# Patient Record
Sex: FEMALE | Race: BLACK OR AFRICAN AMERICAN | NOT HISPANIC OR LATINO | Employment: OTHER | URBAN - METROPOLITAN AREA
[De-identification: names, ages, dates, MRNs, and addresses within clinical notes are randomized per-mention and may not be internally consistent; named-entity substitution may affect disease eponyms.]

---

## 2023-04-25 PROBLEM — R20.8 BURNING SENSATION OF FEET: Status: ACTIVE | Noted: 2023-04-25

## 2023-04-25 PROBLEM — M51.37 DEGENERATION OF INTERVERTEBRAL DISC AT L5-S1 LEVEL: Status: ACTIVE | Noted: 2023-04-25

## 2023-04-25 PROBLEM — M51.379 DEGENERATION OF INTERVERTEBRAL DISC AT L5-S1 LEVEL: Status: ACTIVE | Noted: 2023-04-25

## 2023-04-25 PROBLEM — I87.2 CHRONIC VENOUS INSUFFICIENCY: Status: ACTIVE | Noted: 2023-04-25

## 2023-04-25 PROBLEM — M54.9 BACK PAIN: Status: ACTIVE | Noted: 2023-04-25

## 2023-04-25 PROBLEM — D17.9 LIPOMA: Status: ACTIVE | Noted: 2023-04-25

## 2023-04-25 PROBLEM — E55.9 VITAMIN D DEFICIENCY: Status: ACTIVE | Noted: 2023-04-25

## 2023-04-25 PROBLEM — M54.2 CERVICAL PAIN: Status: ACTIVE | Noted: 2023-04-25

## 2023-04-25 PROBLEM — I35.1 NONRHEUMATIC AORTIC VALVE INSUFFICIENCY: Status: ACTIVE | Noted: 2023-04-25

## 2023-04-25 PROBLEM — M17.10 OSTEOARTHRITIS, LOWER LEG, LOCALIZED: Status: ACTIVE | Noted: 2023-04-25

## 2023-04-25 PROBLEM — N94.10 DYSPAREUNIA, FEMALE: Status: ACTIVE | Noted: 2023-04-25

## 2023-04-25 PROBLEM — M51.379 DEGENERATION OF LUMBOSACRAL INTERVERTEBRAL DISC WITH ACUTE HERNIATION: Status: ACTIVE | Noted: 2023-04-25

## 2023-04-25 PROBLEM — G25.81 RLS (RESTLESS LEGS SYNDROME): Status: ACTIVE | Noted: 2023-04-25

## 2023-04-25 PROBLEM — R06.09 DOE (DYSPNEA ON EXERTION): Status: ACTIVE | Noted: 2023-04-25

## 2023-04-25 PROBLEM — M51.27 DEGENERATION OF LUMBOSACRAL INTERVERTEBRAL DISC WITH ACUTE HERNIATION: Status: ACTIVE | Noted: 2023-04-25

## 2023-04-25 PROBLEM — F17.200 NICOTINE DEPENDENCE: Status: ACTIVE | Noted: 2023-04-25

## 2023-04-25 PROBLEM — J45.909 ASTHMA (HHS-HCC): Status: ACTIVE | Noted: 2023-04-25

## 2023-04-25 PROBLEM — F51.12 INSUFFICIENT SLEEP SYNDROME: Status: ACTIVE | Noted: 2023-04-25

## 2023-04-25 PROBLEM — M65.9 SYNOVITIS: Status: ACTIVE | Noted: 2023-04-25

## 2023-04-25 PROBLEM — R07.89 ATYPICAL CHEST PAIN: Status: ACTIVE | Noted: 2023-04-25

## 2023-04-25 PROBLEM — K43.9 VENTRAL HERNIA: Status: ACTIVE | Noted: 2023-04-25

## 2023-04-25 PROBLEM — C50.419 MALIGNANT NEOPLASM OF UPPER OUTER QUADRANT OF FEMALE BREAST (MULTI): Status: ACTIVE | Noted: 2023-04-25

## 2023-04-25 PROBLEM — R29.6 FALLS: Status: ACTIVE | Noted: 2023-04-25

## 2023-04-25 PROBLEM — M65.90 SYNOVITIS: Status: ACTIVE | Noted: 2023-04-25

## 2023-04-25 PROBLEM — R21: Status: ACTIVE | Noted: 2023-04-25

## 2023-04-25 PROBLEM — E11.9 TYPE 2 DIABETES MELLITUS (MULTI): Status: ACTIVE | Noted: 2023-04-25

## 2023-04-25 PROBLEM — R06.89 ABNORMAL BREATH SOUNDS: Status: ACTIVE | Noted: 2023-04-25

## 2023-04-25 PROBLEM — J01.10 ACUTE FRONTAL SINUSITIS: Status: ACTIVE | Noted: 2023-04-25

## 2023-04-25 PROBLEM — B49 FUNGAL INFECTION: Status: ACTIVE | Noted: 2023-04-25

## 2023-04-25 PROBLEM — T78.40XA ALLERGIC REACTION: Status: ACTIVE | Noted: 2023-04-25

## 2023-04-25 PROBLEM — F31.10 BIPOLAR DISORDER, CURRENT EPISODE MANIC WITHOUT PSYCHOTIC FEATURES (MULTI): Status: ACTIVE | Noted: 2023-04-25

## 2023-04-25 PROBLEM — K43.2 INCISIONAL HERNIA: Status: ACTIVE | Noted: 2023-04-25

## 2023-04-25 PROBLEM — R92.8 MAMMOGRAM ABNORMAL: Status: ACTIVE | Noted: 2023-04-25

## 2023-04-25 PROBLEM — R10.9 ABDOMINAL PAIN: Status: ACTIVE | Noted: 2023-04-25

## 2023-04-25 PROBLEM — R68.89 FLU-LIKE SYMPTOMS: Status: ACTIVE | Noted: 2023-04-25

## 2023-04-25 PROBLEM — G57.90 NEUROPATHY, LOWER EXTREMITY: Status: ACTIVE | Noted: 2023-04-25

## 2023-04-25 PROBLEM — G47.30 SLEEP APNEA: Status: ACTIVE | Noted: 2023-04-25

## 2023-04-25 PROBLEM — M19.90 ARTHRITIS: Status: ACTIVE | Noted: 2023-04-25

## 2023-04-25 PROBLEM — L03.116 CELLULITIS OF LEFT LOWER EXTREMITY: Status: ACTIVE | Noted: 2023-04-25

## 2023-04-25 PROBLEM — M51.36 DEGENERATION OF LUMBOSACRAL INTERVERTEBRAL DISC WITH ACUTE HERNIATION: Status: ACTIVE | Noted: 2023-04-25

## 2023-04-25 PROBLEM — E78.5 HLD (HYPERLIPIDEMIA): Status: ACTIVE | Noted: 2023-04-25

## 2023-04-25 PROBLEM — R10.32 LEFT LOWER QUADRANT PAIN: Status: ACTIVE | Noted: 2023-04-25

## 2023-04-25 PROBLEM — R10.31 ABDOMINAL PAIN, RLQ (RIGHT LOWER QUADRANT): Status: ACTIVE | Noted: 2023-04-25

## 2023-04-25 PROBLEM — D64.9 ANEMIA: Status: ACTIVE | Noted: 2023-04-25

## 2023-04-25 PROBLEM — S31.109A WOUND OF ABDOMEN: Status: ACTIVE | Noted: 2023-04-25
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
## 2023-04-25 PROBLEM — N17.9 ACUTE KIDNEY INJURY (CMS-HCC): Status: ACTIVE | Noted: 2023-04-25

## 2023-04-25 PROBLEM — M10.9 GOUT: Status: ACTIVE | Noted: 2023-04-25

## 2023-04-25 PROBLEM — G47.00 INSOMNIA: Status: ACTIVE | Noted: 2023-04-25

## 2023-04-25 PROBLEM — M25.569 KNEE PAIN: Status: ACTIVE | Noted: 2023-04-25

## 2023-04-25 PROBLEM — M17.11 PRIMARY OSTEOARTHRITIS OF RIGHT KNEE: Status: ACTIVE | Noted: 2023-04-25

## 2023-04-25 PROBLEM — M50.20 CERVICAL DISC HERNIATION: Status: ACTIVE | Noted: 2023-04-25

## 2023-04-25 PROBLEM — M25.551 RIGHT HIP PAIN: Status: ACTIVE | Noted: 2023-04-25

## 2023-04-25 PROBLEM — G47.14 HYPERSOMNIA DUE TO MEDICAL CONDITION: Status: ACTIVE | Noted: 2023-04-25

## 2023-04-25 PROBLEM — M70.61 GREATER TROCHANTERIC BURSITIS OF RIGHT HIP: Status: ACTIVE | Noted: 2023-04-25

## 2023-04-25 PROBLEM — M79.605 PAIN OF LEFT LOWER EXTREMITY: Status: ACTIVE | Noted: 2023-04-25

## 2023-04-25 PROBLEM — I10 HYPERTENSION: Status: ACTIVE | Noted: 2023-04-25

## 2023-04-25 PROBLEM — K46.9 HERNIA, ABDOMINAL: Status: ACTIVE | Noted: 2023-04-25

## 2023-04-25 PROBLEM — M54.16 LUMBAR RADICULOPATHY: Status: ACTIVE | Noted: 2023-04-25

## 2023-04-25 PROBLEM — G47.33 OBSTRUCTIVE SLEEP APNEA: Status: ACTIVE | Noted: 2023-04-25

## 2023-04-25 PROBLEM — R73.09 ABNORMAL GLUCOSE: Status: ACTIVE | Noted: 2023-04-25

## 2023-04-25 PROBLEM — E78.00 PURE HYPERCHOLESTEROLEMIA: Status: ACTIVE | Noted: 2023-04-25

## 2023-04-25 PROBLEM — R05.9 COUGH: Status: ACTIVE | Noted: 2023-04-25

## 2023-04-25 PROBLEM — M79.89 BILATERAL SWELLING OF FEET: Status: ACTIVE | Noted: 2023-04-25

## 2023-04-25 PROBLEM — M11.20 PSEUDOGOUT: Status: ACTIVE | Noted: 2023-04-25

## 2023-04-25 PROBLEM — K21.9 ESOPHAGEAL REFLUX: Status: ACTIVE | Noted: 2023-04-25

## 2023-04-25 PROBLEM — M17.12 PRIMARY OSTEOARTHRITIS OF LEFT KNEE: Status: ACTIVE | Noted: 2023-04-25

## 2023-04-25 PROBLEM — M54.12 CERVICAL RADICULOPATHY: Status: ACTIVE | Noted: 2023-04-25

## 2023-04-25 PROBLEM — D17.20 LIPOMA OF UPPER EXTREMITY: Status: ACTIVE | Noted: 2023-04-25

## 2023-04-25 PROBLEM — K52.9 CHRONIC DIARRHEA OF UNKNOWN ORIGIN: Status: ACTIVE | Noted: 2023-04-25

## 2023-04-25 PROBLEM — I50.33 ACUTE ON CHRONIC DIASTOLIC CONGESTIVE HEART FAILURE (MULTI): Status: ACTIVE | Noted: 2023-04-25

## 2023-04-25 PROBLEM — R19.00 ABDOMINAL MASS: Status: ACTIVE | Noted: 2023-04-25

## 2023-04-25 PROBLEM — E87.6 HYPOKALEMIA: Status: ACTIVE | Noted: 2023-04-25

## 2023-04-25 PROBLEM — M79.7 FIBROMYALGIA: Status: ACTIVE | Noted: 2023-04-25

## 2023-04-25 PROBLEM — M16.0 PRIMARY LOCALIZED OSTEOARTHRITIS OF BOTH HIPS: Status: ACTIVE | Noted: 2023-04-25

## 2023-04-25 LAB
6-ACETYLMORPHINE: <25 NG/ML
7-AMINOCLONAZEPAM: <25 NG/ML
ALPHA-HYDROXYALPRAZOLAM: <25 NG/ML
ALPHA-HYDROXYMIDAZOLAM: <25 NG/ML
ALPRAZOLAM: <25 NG/ML
AMPHETAMINE (PRESENCE) IN URINE BY SCREEN METHOD: ABNORMAL
BARBITURATES PRESENCE IN URINE BY SCREEN METHOD: ABNORMAL
CANNABINOIDS IN URINE BY SCREEN METHOD: ABNORMAL
CHLORDIAZEPOXIDE: <25 NG/ML
CLONAZEPAM: <25 NG/ML
COCAINE (PRESENCE) IN URINE BY SCREEN METHOD: ABNORMAL
CODEINE: <50 NG/ML
CREATINE, URINE FOR DRUG: 53.9 MG/DL
DIAZEPAM: <25 NG/ML
DRUG SCREEN COMMENT URINE: ABNORMAL
EDDP: <25 NG/ML
FENTANYL CONFIRMATION, URINE: <2.5 NG/ML
HYDROCODONE: >2500 NG/ML
HYDROMORPHONE: <25 NG/ML
LORAZEPAM: <25 NG/ML
METHADONE CONFIRMATION,URINE: <25 NG/ML
MIDAZOLAM: <25 NG/ML
MORPHINE URINE: <50 NG/ML
NORDIAZEPAM: <25 NG/ML
NORFENTANYL: <2.5 NG/ML
NORHYDROCODONE: <25 NG/ML
NOROXYCODONE: <25 NG/ML
O-DESMETHYLTRAMADOL: <50 NG/ML
OXAZEPAM: <25 NG/ML
OXYCODONE: <25 NG/ML
OXYMORPHONE: <25 NG/ML
PHENCYCLIDINE (PRESENCE) IN URINE BY SCREEN METHOD: ABNORMAL
TEMAZEPAM: <25 NG/ML
TRAMADOL: <50 NG/ML
ZOLPIDEM METABOLITE (ZCA): <25 NG/ML
ZOLPIDEM: <25 NG/ML

## 2023-06-20 ENCOUNTER — CLINICAL SUPPORT (OUTPATIENT)
Dept: PRIMARY CARE | Facility: CLINIC | Age: 73
End: 2023-06-20
Payer: COMMERCIAL

## 2023-06-20 DIAGNOSIS — Z00.00 ENCOUNTER FOR ANNUAL WELLNESS VISIT (AWV) IN MEDICARE PATIENT: ICD-10-CM

## 2023-06-20 DIAGNOSIS — E78.5 HYPERLIPIDEMIA, UNSPECIFIED HYPERLIPIDEMIA TYPE: ICD-10-CM

## 2023-06-20 DIAGNOSIS — E55.9 VITAMIN D DEFICIENCY: ICD-10-CM

## 2023-06-20 DIAGNOSIS — E11.69 TYPE 2 DIABETES MELLITUS WITH OTHER SPECIFIED COMPLICATION, WITH LONG-TERM CURRENT USE OF INSULIN (MULTI): ICD-10-CM

## 2023-06-20 DIAGNOSIS — E87.6 HYPOKALEMIA: ICD-10-CM

## 2023-06-20 DIAGNOSIS — Z79.4 TYPE 2 DIABETES MELLITUS WITH OTHER SPECIFIED COMPLICATION, WITH LONG-TERM CURRENT USE OF INSULIN (MULTI): ICD-10-CM

## 2023-06-20 DIAGNOSIS — E03.9 HYPOTHYROIDISM, UNSPECIFIED TYPE: ICD-10-CM

## 2023-06-20 DIAGNOSIS — I10 PRIMARY HYPERTENSION: ICD-10-CM

## 2023-06-20 DIAGNOSIS — N17.9 ACUTE KIDNEY INJURY (CMS-HCC): ICD-10-CM

## 2023-06-20 DIAGNOSIS — E78.00 ELEVATED LDL CHOLESTEROL LEVEL: ICD-10-CM

## 2023-06-20 DIAGNOSIS — I10 BENIGN ESSENTIAL HYPERTENSION: ICD-10-CM

## 2023-06-20 DIAGNOSIS — D50.9 IRON DEFICIENCY ANEMIA, UNSPECIFIED IRON DEFICIENCY ANEMIA TYPE: ICD-10-CM

## 2023-06-20 LAB
ALANINE AMINOTRANSFERASE (SGPT) (U/L) IN SER/PLAS: 16 U/L (ref 7–45)
ALBUMIN (G/DL) IN SER/PLAS: 4 G/DL (ref 3.4–5)
ALKALINE PHOSPHATASE (U/L) IN SER/PLAS: 91 U/L (ref 33–136)
ANION GAP IN SER/PLAS: 15 MMOL/L (ref 10–20)
ASPARTATE AMINOTRANSFERASE (SGOT) (U/L) IN SER/PLAS: 19 U/L (ref 9–39)
BILIRUBIN TOTAL (MG/DL) IN SER/PLAS: 0.4 MG/DL (ref 0–1.2)
CALCIDIOL (25 OH VITAMIN D3) (NG/ML) IN SER/PLAS: 59 NG/ML
CALCIUM (MG/DL) IN SER/PLAS: 9.9 MG/DL (ref 8.6–10.6)
CARBON DIOXIDE, TOTAL (MMOL/L) IN SER/PLAS: 27 MMOL/L (ref 21–32)
CHLORIDE (MMOL/L) IN SER/PLAS: 102 MMOL/L (ref 98–107)
CHOLESTEROL (MG/DL) IN SER/PLAS: 197 MG/DL (ref 0–199)
CHOLESTEROL IN HDL (MG/DL) IN SER/PLAS: 48.5 MG/DL
CHOLESTEROL/HDL RATIO: 4.1
CREATININE (MG/DL) IN SER/PLAS: 1.22 MG/DL (ref 0.5–1.05)
ERYTHROCYTE DISTRIBUTION WIDTH (RATIO) BY AUTOMATED COUNT: 16.6 % (ref 11.5–14.5)
ERYTHROCYTE MEAN CORPUSCULAR HEMOGLOBIN CONCENTRATION (G/DL) BY AUTOMATED: 30.6 G/DL (ref 32–36)
ERYTHROCYTE MEAN CORPUSCULAR VOLUME (FL) BY AUTOMATED COUNT: 94 FL (ref 80–100)
ERYTHROCYTES (10*6/UL) IN BLOOD BY AUTOMATED COUNT: 4.64 X10E12/L (ref 4–5.2)
ESTIMATED AVERAGE GLUCOSE FOR HBA1C: 134 MG/DL
GFR FEMALE: 47 ML/MIN/1.73M2
GLUCOSE (MG/DL) IN SER/PLAS: 130 MG/DL (ref 74–99)
HEMATOCRIT (%) IN BLOOD BY AUTOMATED COUNT: 43.4 % (ref 36–46)
HEMOGLOBIN (G/DL) IN BLOOD: 13.3 G/DL (ref 12–16)
HEMOGLOBIN A1C/HEMOGLOBIN TOTAL IN BLOOD: 6.3 %
LDL: 119 MG/DL (ref 0–99)
LEUKOCYTES (10*3/UL) IN BLOOD BY AUTOMATED COUNT: 9.4 X10E9/L (ref 4.4–11.3)
NRBC (PER 100 WBCS) BY AUTOMATED COUNT: 0 /100 WBC (ref 0–0)
PLATELETS (10*3/UL) IN BLOOD AUTOMATED COUNT: 367 X10E9/L (ref 150–450)
POTASSIUM (MMOL/L) IN SER/PLAS: 4.1 MMOL/L (ref 3.5–5.3)
PROTEIN TOTAL: 7.3 G/DL (ref 6.4–8.2)
SODIUM (MMOL/L) IN SER/PLAS: 140 MMOL/L (ref 136–145)
THYROTROPIN (MIU/L) IN SER/PLAS BY DETECTION LIMIT <= 0.05 MIU/L: 1.08 MIU/L (ref 0.44–3.98)
TRIGLYCERIDE (MG/DL) IN SER/PLAS: 147 MG/DL (ref 0–149)
UREA NITROGEN (MG/DL) IN SER/PLAS: 14 MG/DL (ref 6–23)
VLDL: 29 MG/DL (ref 0–40)

## 2023-06-20 PROCEDURE — 80061 LIPID PANEL: CPT

## 2023-06-20 PROCEDURE — 85027 COMPLETE CBC AUTOMATED: CPT

## 2023-06-20 PROCEDURE — 80053 COMPREHEN METABOLIC PANEL: CPT

## 2023-06-20 PROCEDURE — 84443 ASSAY THYROID STIM HORMONE: CPT

## 2023-06-20 PROCEDURE — 82306 VITAMIN D 25 HYDROXY: CPT

## 2023-06-20 PROCEDURE — 83036 HEMOGLOBIN GLYCOSYLATED A1C: CPT

## 2023-06-22 ENCOUNTER — OFFICE VISIT (OUTPATIENT)
Dept: PRIMARY CARE | Facility: CLINIC | Age: 73
End: 2023-06-22
Payer: COMMERCIAL

## 2023-06-22 VITALS
HEIGHT: 64 IN | DIASTOLIC BLOOD PRESSURE: 83 MMHG | WEIGHT: 206 LBS | HEART RATE: 73 BPM | RESPIRATION RATE: 12 BRPM | BODY MASS INDEX: 35.17 KG/M2 | SYSTOLIC BLOOD PRESSURE: 147 MMHG | OXYGEN SATURATION: 94 %

## 2023-06-22 DIAGNOSIS — M50.20 CERVICAL DISC HERNIATION: ICD-10-CM

## 2023-06-22 DIAGNOSIS — I10 HYPERTENSION, UNSPECIFIED TYPE: ICD-10-CM

## 2023-06-22 DIAGNOSIS — Z79.4 TYPE 2 DIABETES MELLITUS WITH OTHER SPECIFIED COMPLICATION, WITH LONG-TERM CURRENT USE OF INSULIN (MULTI): ICD-10-CM

## 2023-06-22 DIAGNOSIS — F41.9 ANXIETY: Primary | ICD-10-CM

## 2023-06-22 DIAGNOSIS — Z00.00 ENCOUNTER FOR ANNUAL WELLNESS VISIT (AWV) IN MEDICARE PATIENT: ICD-10-CM

## 2023-06-22 DIAGNOSIS — E11.69 TYPE 2 DIABETES MELLITUS WITH OTHER SPECIFIED COMPLICATION, WITH LONG-TERM CURRENT USE OF INSULIN (MULTI): ICD-10-CM

## 2023-06-22 PROBLEM — J30.9 ALLERGIC RHINITIS: Status: ACTIVE | Noted: 2023-05-16

## 2023-06-22 PROBLEM — F31.32: Chronic | Status: ACTIVE | Noted: 2021-07-26

## 2023-06-22 PROBLEM — F12.10 CANNABIS ABUSE: Status: ACTIVE | Noted: 2021-07-26

## 2023-06-22 PROBLEM — I11.0 HYPERTENSIVE HEART DISEASE WITH HEART FAILURE (MULTI): Chronic | Status: ACTIVE | Noted: 2019-12-17

## 2023-06-22 PROBLEM — I50.32 CHRONIC DIASTOLIC (CONGESTIVE) HEART FAILURE (MULTI): Chronic | Status: ACTIVE | Noted: 2019-12-17

## 2023-06-22 PROBLEM — E66.9 OBESITY (BMI 30-39.9): Status: ACTIVE | Noted: 2023-05-30

## 2023-06-22 LAB
POC APPEARANCE, URINE: CLEAR
POC BILIRUBIN, URINE: NEGATIVE
POC BLOOD, URINE: NEGATIVE
POC COLOR, URINE: YELLOW
POC FINGERSTICK BLOOD GLUCOSE: 121 MG/DL (ref 70–100)
POC GLUCOSE, URINE: NEGATIVE MG/DL
POC KETONES, URINE: NEGATIVE MG/DL
POC LEUKOCYTES, URINE: NEGATIVE
POC NITRITE,URINE: NEGATIVE
POC OCCULT BLOOD STOOL #1: NEGATIVE
POC PH, URINE: 5 PH
POC PROTEIN, URINE: NEGATIVE MG/DL
POC SPECIFIC GRAVITY, URINE: 1.02
POC UROBILINOGEN, URINE: 0.2 EU/DL

## 2023-06-22 PROCEDURE — 3077F SYST BP >= 140 MM HG: CPT | Performed by: INTERNAL MEDICINE

## 2023-06-22 PROCEDURE — 99214 OFFICE O/P EST MOD 30 MIN: CPT | Performed by: INTERNAL MEDICINE

## 2023-06-22 PROCEDURE — 93272 ECG/REVIEW INTERPRET ONLY: CPT | Performed by: INTERNAL MEDICINE

## 2023-06-22 PROCEDURE — 82270 OCCULT BLOOD FECES: CPT | Performed by: INTERNAL MEDICINE

## 2023-06-22 PROCEDURE — 1036F TOBACCO NON-USER: CPT | Performed by: INTERNAL MEDICINE

## 2023-06-22 PROCEDURE — 82962 GLUCOSE BLOOD TEST: CPT | Performed by: INTERNAL MEDICINE

## 2023-06-22 PROCEDURE — 1159F MED LIST DOCD IN RCRD: CPT | Performed by: INTERNAL MEDICINE

## 2023-06-22 PROCEDURE — 81002 URINALYSIS NONAUTO W/O SCOPE: CPT | Performed by: INTERNAL MEDICINE

## 2023-06-22 PROCEDURE — 93000 ELECTROCARDIOGRAM COMPLETE: CPT | Performed by: INTERNAL MEDICINE

## 2023-06-22 PROCEDURE — G0439 PPPS, SUBSEQ VISIT: HCPCS | Performed by: INTERNAL MEDICINE

## 2023-06-22 PROCEDURE — 3079F DIAST BP 80-89 MM HG: CPT | Performed by: INTERNAL MEDICINE

## 2023-06-22 PROCEDURE — 3044F HG A1C LEVEL LT 7.0%: CPT | Performed by: INTERNAL MEDICINE

## 2023-06-22 PROCEDURE — 4010F ACE/ARB THERAPY RXD/TAKEN: CPT | Performed by: INTERNAL MEDICINE

## 2023-06-22 RX ORDER — LINAGLIPTIN 5 MG/1
5 TABLET, FILM COATED ORAL DAILY
COMMUNITY
Start: 2018-05-01 | End: 2023-07-24

## 2023-06-22 RX ORDER — CLOTRIMAZOLE AND BETAMETHASONE DIPROPIONATE 10; .64 MG/G; MG/G
1 CREAM TOPICAL 2 TIMES DAILY
COMMUNITY
Start: 2022-06-01 | End: 2023-12-12

## 2023-06-22 RX ORDER — FUROSEMIDE 40 MG/1
40 TABLET ORAL DAILY
COMMUNITY
Start: 2019-11-19 | End: 2024-02-12

## 2023-06-22 RX ORDER — TIZANIDINE 4 MG/1
4 TABLET ORAL EVERY 6 HOURS PRN
Qty: 60 TABLET | Refills: 0 | Status: SHIPPED | OUTPATIENT
Start: 2023-06-22 | End: 2023-07-03

## 2023-06-22 RX ORDER — FLASH GLUCOSE SENSOR
KIT MISCELLANEOUS
COMMUNITY
Start: 2023-05-24 | End: 2023-09-12

## 2023-06-22 RX ORDER — POTASSIUM CHLORIDE 750 MG/1
10 TABLET, EXTENDED RELEASE ORAL DAILY
COMMUNITY
Start: 2019-11-05 | End: 2023-08-31

## 2023-06-22 RX ORDER — FLUTICASONE PROPIONATE AND SALMETEROL 250; 50 UG/1; UG/1
1 POWDER RESPIRATORY (INHALATION) 2 TIMES DAILY
COMMUNITY

## 2023-06-22 RX ORDER — DOXYLAMINE SUCCINATE 25 MG
TABLET ORAL
COMMUNITY
Start: 2023-01-30

## 2023-06-22 RX ORDER — CHOLECALCIFEROL (VITAMIN D3) 50 MCG
2000 TABLET ORAL
COMMUNITY

## 2023-06-22 RX ORDER — FLUTICASONE PROPIONATE 50 MCG
1 SPRAY, SUSPENSION (ML) NASAL DAILY
COMMUNITY
Start: 2020-07-20

## 2023-06-22 RX ORDER — TRAMADOL HYDROCHLORIDE 50 MG/1
50 TABLET ORAL EVERY 6 HOURS PRN
COMMUNITY
Start: 2023-04-20 | End: 2023-10-16 | Stop reason: SDUPTHER

## 2023-06-22 RX ORDER — TIZANIDINE 4 MG/1
4 TABLET ORAL EVERY 6 HOURS PRN
COMMUNITY
Start: 2015-09-30 | End: 2023-06-22 | Stop reason: SDUPTHER

## 2023-06-22 RX ORDER — BUSPIRONE HYDROCHLORIDE 5 MG/1
5 TABLET ORAL 2 TIMES DAILY PRN
Qty: 60 TABLET | Refills: 2 | Status: SHIPPED | OUTPATIENT
Start: 2023-06-22 | End: 2023-07-17

## 2023-06-22 RX ORDER — IPRATROPIUM BROMIDE AND ALBUTEROL SULFATE 2.5; .5 MG/3ML; MG/3ML
3 SOLUTION RESPIRATORY (INHALATION)
COMMUNITY
Start: 2022-11-10 | End: 2023-10-30 | Stop reason: SDUPTHER

## 2023-06-22 RX ORDER — CLONAZEPAM 1 MG/1
1 TABLET ORAL 2 TIMES DAILY PRN
Qty: 60 TABLET | Refills: 0 | Status: CANCELLED | OUTPATIENT
Start: 2023-06-22 | End: 2023-07-22

## 2023-06-22 RX ORDER — ASPIRIN 81 MG/1
1 TABLET ORAL DAILY
COMMUNITY
Start: 2019-11-19

## 2023-06-22 RX ORDER — ALBUTEROL SULFATE 90 UG/1
1 AEROSOL, METERED RESPIRATORY (INHALATION)
COMMUNITY
Start: 2017-06-20 | End: 2023-10-30 | Stop reason: SDUPTHER

## 2023-06-22 RX ORDER — NITROGLYCERIN 0.4 MG/1
0.4 TABLET SUBLINGUAL EVERY 5 MIN PRN
COMMUNITY
Start: 2022-10-24 | End: 2024-03-19 | Stop reason: SDUPTHER

## 2023-06-22 ASSESSMENT — ENCOUNTER SYMPTOMS
FATIGUE: 0
DEPRESSION: 0
FEVER: 0
DYSURIA: 0
OCCASIONAL FEELINGS OF UNSTEADINESS: 1
BLOOD IN STOOL: 0
MUSCULOSKELETAL NEGATIVE: 1
ABDOMINAL PAIN: 0
DIZZINESS: 0
SHORTNESS OF BREATH: 0
LOSS OF SENSATION IN FEET: 1
HEMATURIA: 0
PSYCHIATRIC NEGATIVE: 1
LIGHT-HEADEDNESS: 0

## 2023-06-22 ASSESSMENT — PATIENT HEALTH QUESTIONNAIRE - PHQ9
2. FEELING DOWN, DEPRESSED OR HOPELESS: NOT AT ALL
SUM OF ALL RESPONSES TO PHQ9 QUESTIONS 1 AND 2: 0
1. LITTLE INTEREST OR PLEASURE IN DOING THINGS: NOT AT ALL

## 2023-06-22 NOTE — PROGRESS NOTES
"Subjective :  Chief Complaint: Serjio Nunes is an 73 y.o. female here for an annual wellness visit and general medical care and f/u.     HPI:  Serjio is here for an AWV today and expresses no concerns today.         Review of Systems   Constitutional:  Negative for fatigue and fever.   HENT: Negative.     Respiratory:  Negative for shortness of breath.    Cardiovascular:  Negative for chest pain and leg swelling.   Gastrointestinal:  Negative for abdominal pain and blood in stool.   Genitourinary:  Negative for dysuria and hematuria.   Musculoskeletal: Negative.    Neurological:  Negative for dizziness and light-headedness.   Psychiatric/Behavioral: Negative.       All systems reviewed and negative except for what was mentioned in the HPI    Objective   /83   Pulse 73   Resp 12   Ht 1.626 m (5' 4\")   Wt 93.4 kg (206 lb)   SpO2 94%   BMI 35.36 kg/m²     Physical Exam  Vitals reviewed.   Constitutional:       Appearance: Normal appearance.   HENT:      Head: Normocephalic and atraumatic.   Cardiovascular:      Rate and Rhythm: Normal rate and regular rhythm.   Pulmonary:      Effort: Pulmonary effort is normal.      Breath sounds: Normal breath sounds.   Abdominal:      General: Bowel sounds are normal.      Palpations: Abdomen is soft.   Musculoskeletal:      Cervical back: Neck supple.   Skin:     General: Skin is warm and dry.   Neurological:      General: No focal deficit present.      Mental Status: She is alert.   Psychiatric:         Mood and Affect: Mood normal.         Behavior: Behavior is cooperative.         Imaging:  No results found.     Labs reviewed:    Lab Results   Component Value Date    WBC 9.4 06/20/2023    HGB 13.3 06/20/2023    HCT 43.4 06/20/2023     06/20/2023    CHOL 197 06/20/2023    TRIG 147 06/20/2023    HDL 48.5 06/20/2023    ALT 16 06/20/2023    AST 19 06/20/2023     06/20/2023    K 4.1 06/20/2023     06/20/2023    CREATININE 1.22 (H) 06/20/2023    " BUN 14 06/20/2023    CO2 27 06/20/2023    TSH 1.08 06/20/2023    INR 1.0 06/24/2021    HGBA1C 6.3 (A) 06/20/2023       Past Medical, Surgical, and Family History reviewed and updated in chart.    I have reviewed and reconciled the medication list with the patient today.   Current Outpatient Medications:     albuterol 90 mcg/actuation inhaler, Inhale 1 puff 3 times a day., Disp: , Rfl:     amLODIPine (Norvasc) 10 mg tablet, TAKE 1 TABLET BY MOUTH EVERY DAY, Disp: 90 tablet, Rfl: 3    aspirin 81 mg EC tablet, Take 1 tablet (81 mg) by mouth once daily., Disp: , Rfl:     carvedilol (Coreg) 25 mg tablet, TAKE 1 TABLET BY MOUTH TWICE A DAY, Disp: 180 tablet, Rfl: 3    cholecalciferol (Vitamin D-3) 50 MCG (2000 UT) tablet, Take 1 tablet (2,000 Units) by mouth once daily., Disp: , Rfl:     clotrimazole-betamethasone (Lotrisone) cream, Apply 1 Application topically 2 times a day., Disp: , Rfl:     colchicine 0.6 mg tablet, TAKE 1 TABLET BY MOUTH EVERY DAY, Disp: 90 tablet, Rfl: 1    fluticasone (Flonase) 50 mcg/actuation nasal spray, Administer 1 spray into each nostril once daily., Disp: , Rfl:     fluticasone propion-salmeteroL (Advair Diskus) 250-50 mcg/dose diskus inhaler, Inhale 1 puff twice a day., Disp: , Rfl:     FreeStyle Jaycob 14 Day Sensor kit, APPLY SENSOR TO BACK UPPER ARM REMOVE AND REPLACE EVERY 14 DAYS USE WITH DEVICE, Disp: , Rfl:     furosemide (Lasix) 40 mg tablet, Take 1 tablet (40 mg) by mouth once daily., Disp: , Rfl:     ipratropium-albuteroL (Duo-Neb) 0.5-2.5 mg/3 mL nebulizer solution, Take 3 mL by nebulization 3 times a day., Disp: , Rfl:     Klor-Con M10 10 mEq ER tablet, Take 1 tablet (10 mEq) by mouth once daily., Disp: , Rfl:     losartan (Cozaar) 100 mg tablet, TAKE 1 TABLET BY MOUTH EVERY DAY, Disp: 90 tablet, Rfl: 3    miconazole (Micotin) 2 % cream, miconazole nitrate 2 % topical cream  APPLY SPARINGLY TO AFFECTED AREA TWICE A DAY, Disp: , Rfl:     nitroglycerin (Nitrostat) 0.4 mg SL tablet,  Place 1 tablet (0.4 mg) under the tongue every 5 minutes if needed for chest pain., Disp: , Rfl:     tiZANidine (Zanaflex) 4 mg tablet, Take 1 tablet (4 mg) by mouth every 6 hours if needed., Disp: , Rfl:     Tradjenta 5 mg tablet, Take 1 tablet (5 mg) by mouth once daily., Disp: , Rfl:     traMADol (Ultram) 50 mg tablet, Take 1 tablet (50 mg) by mouth every 6 hours if needed., Disp: , Rfl:      List of current healthcare providers:  Patient Care Team:  Raymond Carnes MD as PCP - General     HRA:     Over the past 2 weeks, how often have you been bothered by any of the following problems?  Little interest or pleasure in doing things: Not at all  Feeling down, depressed, or hopeless: Not at all  Patient Health Questionnaire-2 Score: 0                         Assessment/Plan :  Problem List Items Addressed This Visit          Circulatory    Hypertension    Relevant Orders    ECG 12 lead (Clinic Performed)       Endocrine/Metabolic    Type 2 diabetes mellitus (CMS/HCC)    Relevant Orders    POCT fingerstick glucose manually resulted (Completed)     Other Visit Diagnoses       Encounter for annual wellness visit (AWV) in Medicare patient        Relevant Orders    POCT UA (nonautomated) manually resulted (Completed)    POCT fingerstick glucose manually resulted (Completed)    POCT occult blood stool manually resulted (Completed)    ECG 12 lead (Clinic Performed)          The following health maintenance schedule was reviewed with the patient and provided in printed form in the after visit summary:  Health Maintenance   Topic Date Due    Medicare Annual Wellness Visit (AWV)  Never done    Diabetes: Foot Exam  Never done    Diabetes: Retinopathy Screening  Never done    Diabetes: Urine Protein Screening  Never done    Hepatitis A Vaccines (1 of 2 - Risk 2-dose series) Never done    DTaP/Tdap/Td Vaccines (1 - Tdap) Never done    Zoster Vaccines (1 of 2) Never done    COVID-19 Vaccine (3 - Booster for Pfizer series)  01/10/2022    Diabetes: Hemoglobin A1C  09/20/2023    Echocardiogram  04/14/2024    Creatinine Level  06/20/2024    Potassium Level  06/20/2024    TSH Level  06/20/2024    Lipid Panel  06/20/2024    Colorectal Cancer Screening  05/02/2029    Influenza Vaccine  Completed    Pneumococcal Vaccine: 65+ Years  Completed    Hepatitis C Screening  Completed    Bone Density Scan  Completed    HIB Vaccines  Aged Out    Hepatitis B Vaccines  Aged Out    IPV Vaccines  Aged Out    Meningococcal Vaccine  Aged Out    Rotavirus Vaccines  Aged Out    HPV Vaccines  Aged Out    Irritable Bowel Syndrome  Discontinued       Advance Care Planning   Discussed with patient             Orders Placed This Encounter   Procedures    POCT UA (nonautomated) manually resulted    POCT fingerstick glucose manually resulted    POCT occult blood stool manually resulted    ECG 12 lead (Clinic Performed)       Continue current medications as listed  Follow up in 6 months for blood work  Colonoscopy needed next ear 05/2024

## 2023-07-02 DIAGNOSIS — M50.20 CERVICAL DISC HERNIATION: ICD-10-CM

## 2023-07-03 RX ORDER — TIZANIDINE 4 MG/1
4 TABLET ORAL EVERY 6 HOURS PRN
Qty: 60 TABLET | Refills: 2 | Status: SHIPPED | OUTPATIENT
Start: 2023-07-03 | End: 2023-07-12

## 2023-07-12 DIAGNOSIS — M50.20 CERVICAL DISC HERNIATION: ICD-10-CM

## 2023-07-12 RX ORDER — TIZANIDINE 4 MG/1
4 TABLET ORAL EVERY 6 HOURS PRN
Qty: 60 TABLET | Refills: 2 | Status: SHIPPED | OUTPATIENT
Start: 2023-07-12 | End: 2023-08-03

## 2023-07-17 DIAGNOSIS — F41.9 ANXIETY: ICD-10-CM

## 2023-07-17 RX ORDER — BUSPIRONE HYDROCHLORIDE 5 MG/1
5 TABLET ORAL 2 TIMES DAILY PRN
Qty: 60 TABLET | Refills: 2 | Status: SHIPPED | OUTPATIENT
Start: 2023-07-17 | End: 2023-10-16

## 2023-07-22 DIAGNOSIS — Z79.4 TYPE 2 DIABETES MELLITUS WITH OTHER SPECIFIED COMPLICATION, WITH LONG-TERM CURRENT USE OF INSULIN (MULTI): ICD-10-CM

## 2023-07-22 DIAGNOSIS — E11.69 TYPE 2 DIABETES MELLITUS WITH OTHER SPECIFIED COMPLICATION, WITH LONG-TERM CURRENT USE OF INSULIN (MULTI): ICD-10-CM

## 2023-07-24 RX ORDER — LINAGLIPTIN 5 MG/1
5 TABLET, FILM COATED ORAL DAILY
Qty: 90 TABLET | Refills: 1 | Status: SHIPPED | OUTPATIENT
Start: 2023-07-24 | End: 2024-03-25

## 2023-07-28 DIAGNOSIS — M50.20 CERVICAL DISC HERNIATION: ICD-10-CM

## 2023-08-03 RX ORDER — TIZANIDINE 4 MG/1
4 TABLET ORAL EVERY 6 HOURS PRN
Qty: 60 TABLET | Refills: 2 | Status: SHIPPED | OUTPATIENT
Start: 2023-08-03 | End: 2023-08-16

## 2023-08-11 DIAGNOSIS — M50.20 CERVICAL DISC HERNIATION: ICD-10-CM

## 2023-08-16 RX ORDER — TIZANIDINE 4 MG/1
4 TABLET ORAL EVERY 6 HOURS PRN
Qty: 60 TABLET | Refills: 2 | Status: SHIPPED | OUTPATIENT
Start: 2023-08-16 | End: 2023-08-28

## 2023-08-26 DIAGNOSIS — M50.20 CERVICAL DISC HERNIATION: ICD-10-CM

## 2023-08-28 RX ORDER — TIZANIDINE 4 MG/1
4 TABLET ORAL EVERY 6 HOURS PRN
Qty: 60 TABLET | Refills: 2 | Status: SHIPPED | OUTPATIENT
Start: 2023-08-28 | End: 2023-09-10

## 2023-08-30 DIAGNOSIS — E87.6 HYPOKALEMIA: ICD-10-CM

## 2023-08-31 RX ORDER — POTASSIUM CHLORIDE 750 MG/1
10 TABLET, EXTENDED RELEASE ORAL DAILY
Qty: 90 TABLET | Refills: 3 | Status: SHIPPED | OUTPATIENT
Start: 2023-08-31

## 2023-09-01 DIAGNOSIS — M10.9 GOUT, UNSPECIFIED: ICD-10-CM

## 2023-09-01 RX ORDER — COLCHICINE 0.6 MG/1
TABLET ORAL
Qty: 90 TABLET | Refills: 1 | Status: SHIPPED | OUTPATIENT
Start: 2023-09-01

## 2023-09-08 DIAGNOSIS — M50.20 CERVICAL DISC HERNIATION: ICD-10-CM

## 2023-09-10 RX ORDER — TIZANIDINE 4 MG/1
4 TABLET ORAL EVERY 6 HOURS PRN
Qty: 60 TABLET | Refills: 2 | Status: SHIPPED | OUTPATIENT
Start: 2023-09-10 | End: 2023-11-30

## 2023-09-11 DIAGNOSIS — E11.9 TYPE 2 DIABETES MELLITUS WITHOUT COMPLICATIONS (MULTI): ICD-10-CM

## 2023-09-12 PROBLEM — J44.9 CHRONIC OBSTRUCTIVE LUNG DISEASE (MULTI): Status: ACTIVE | Noted: 2023-09-12

## 2023-09-12 PROBLEM — Z87.891 PERSONAL HISTORY OF NICOTINE DEPENDENCE: Status: ACTIVE | Noted: 2022-11-07

## 2023-09-12 PROBLEM — Z79.84 LONG TERM (CURRENT) USE OF ORAL HYPOGLYCEMIC DRUGS: Status: ACTIVE | Noted: 2022-11-07

## 2023-09-12 PROBLEM — G25.81 RESTLESS LEG SYNDROME: Status: ACTIVE | Noted: 2022-11-07

## 2023-09-12 PROBLEM — F31.9 BIPOLAR DISORDER (MULTI): Status: ACTIVE | Noted: 2022-11-07

## 2023-09-12 PROBLEM — Z90.710 ACQUIRED ABSENCE OF BOTH CERVIX AND UTERUS: Status: ACTIVE | Noted: 2022-11-07

## 2023-09-12 PROBLEM — K21.9 GASTROESOPHAGEAL REFLUX DISEASE WITHOUT ESOPHAGITIS: Status: ACTIVE | Noted: 2022-11-07

## 2023-09-12 PROBLEM — Z79.2 LONG TERM (CURRENT) USE OF ANTIBIOTICS: Status: ACTIVE | Noted: 2022-11-07

## 2023-09-12 PROBLEM — G89.29 OTHER CHRONIC PAIN: Status: ACTIVE | Noted: 2022-11-07

## 2023-09-12 PROBLEM — E78.5 DYSLIPIDEMIA: Status: ACTIVE | Noted: 2023-09-12

## 2023-09-12 PROBLEM — Z79.52 LONG TERM (CURRENT) USE OF SYSTEMIC STEROIDS: Status: ACTIVE | Noted: 2022-11-07

## 2023-09-12 PROBLEM — Z85.3 PERSONAL HISTORY OF MALIGNANT NEOPLASM OF BREAST: Status: ACTIVE | Noted: 2022-11-07

## 2023-09-12 PROBLEM — Z79.891 LONG-TERM CURRENT USE OF OPIATE ANALGESIC: Status: ACTIVE | Noted: 2022-11-07

## 2023-09-12 RX ORDER — HYDROXYZINE PAMOATE 25 MG/1
CAPSULE ORAL
COMMUNITY
Start: 2022-10-13 | End: 2023-10-30 | Stop reason: ALTCHOICE

## 2023-09-12 RX ORDER — TRAZODONE HYDROCHLORIDE 100 MG/1
1 TABLET ORAL NIGHTLY PRN
COMMUNITY
Start: 2022-10-31 | End: 2023-10-30 | Stop reason: ALTCHOICE

## 2023-09-12 RX ORDER — CLONAZEPAM 0.5 MG/1
0.5 TABLET ORAL DAILY
COMMUNITY
Start: 2023-02-24 | End: 2023-10-30 | Stop reason: ALTCHOICE

## 2023-09-12 RX ORDER — FLASH GLUCOSE SENSOR
KIT MISCELLANEOUS
Qty: 200 EACH | Refills: 3 | Status: SHIPPED | OUTPATIENT
Start: 2023-09-12 | End: 2023-12-29

## 2023-09-12 RX ORDER — FLUOXETINE HYDROCHLORIDE 40 MG/1
2 CAPSULE ORAL DAILY
COMMUNITY
Start: 2022-12-22 | End: 2023-10-30 | Stop reason: ALTCHOICE

## 2023-09-12 RX ORDER — GABAPENTIN 300 MG/1
CAPSULE ORAL
COMMUNITY
Start: 2022-10-14 | End: 2023-10-16 | Stop reason: ALTCHOICE

## 2023-09-12 RX ORDER — PANTOPRAZOLE SODIUM 40 MG/1
TABLET, DELAYED RELEASE ORAL
COMMUNITY
Start: 2022-11-08 | End: 2023-10-30 | Stop reason: ALTCHOICE

## 2023-09-12 RX ORDER — FLUOCINONIDE 0.5 MG/G
CREAM TOPICAL 2 TIMES DAILY
COMMUNITY
Start: 2023-09-01

## 2023-09-12 RX ORDER — BUPROPION HYDROCHLORIDE 75 MG/1
1 TABLET ORAL DAILY
COMMUNITY
Start: 2022-10-13 | End: 2023-10-30 | Stop reason: ALTCHOICE

## 2023-09-12 RX ORDER — BENZTROPINE MESYLATE 0.5 MG/1
1 TABLET ORAL 2 TIMES DAILY
COMMUNITY
Start: 2022-10-25 | End: 2023-10-30 | Stop reason: ALTCHOICE

## 2023-09-12 RX ORDER — BUPROPION HYDROCHLORIDE 150 MG/1
1 TABLET ORAL DAILY
COMMUNITY
Start: 2023-02-24 | End: 2023-10-30 | Stop reason: ALTCHOICE

## 2023-09-12 RX ORDER — HYDROCODONE BITARTRATE AND ACETAMINOPHEN 5; 325 MG/1; MG/1
TABLET ORAL EVERY 8 HOURS PRN
COMMUNITY
Start: 2022-11-12 | End: 2023-10-16 | Stop reason: ALTCHOICE

## 2023-10-15 DIAGNOSIS — F41.9 ANXIETY: ICD-10-CM

## 2023-10-16 ENCOUNTER — OFFICE VISIT (OUTPATIENT)
Dept: PAIN MEDICINE | Facility: CLINIC | Age: 73
End: 2023-10-16
Payer: COMMERCIAL

## 2023-10-16 DIAGNOSIS — M54.16 LUMBAR RADICULOPATHY: ICD-10-CM

## 2023-10-16 DIAGNOSIS — M51.37 DEGENERATION OF LUMBAR OR LUMBOSACRAL INTERVERTEBRAL DISC: ICD-10-CM

## 2023-10-16 DIAGNOSIS — M19.112 OSTEOARTHRITIS OF LEFT SHOULDER DUE TO ROTATOR CUFF INJURY: ICD-10-CM

## 2023-10-16 DIAGNOSIS — M79.7 FIBROMYALGIA: ICD-10-CM

## 2023-10-16 DIAGNOSIS — S46.002S OSTEOARTHRITIS OF LEFT SHOULDER DUE TO ROTATOR CUFF INJURY: ICD-10-CM

## 2023-10-16 DIAGNOSIS — M17.11 PRIMARY OSTEOARTHRITIS OF RIGHT KNEE: Primary | ICD-10-CM

## 2023-10-16 PROCEDURE — 99214 OFFICE O/P EST MOD 30 MIN: CPT | Performed by: PHYSICAL MEDICINE & REHABILITATION

## 2023-10-16 PROCEDURE — 1036F TOBACCO NON-USER: CPT | Performed by: PHYSICAL MEDICINE & REHABILITATION

## 2023-10-16 PROCEDURE — 1159F MED LIST DOCD IN RCRD: CPT | Performed by: PHYSICAL MEDICINE & REHABILITATION

## 2023-10-16 PROCEDURE — 3044F HG A1C LEVEL LT 7.0%: CPT | Performed by: PHYSICAL MEDICINE & REHABILITATION

## 2023-10-16 PROCEDURE — 1126F AMNT PAIN NOTED NONE PRSNT: CPT | Performed by: PHYSICAL MEDICINE & REHABILITATION

## 2023-10-16 PROCEDURE — 4010F ACE/ARB THERAPY RXD/TAKEN: CPT | Performed by: PHYSICAL MEDICINE & REHABILITATION

## 2023-10-16 RX ORDER — TRAMADOL HYDROCHLORIDE 50 MG/1
50 TABLET ORAL EVERY 12 HOURS PRN
Qty: 56 TABLET | Refills: 1 | Status: SHIPPED | OUTPATIENT
Start: 2023-11-01 | End: 2023-11-29

## 2023-10-16 RX ORDER — BUSPIRONE HYDROCHLORIDE 5 MG/1
5 TABLET ORAL 2 TIMES DAILY PRN
Qty: 180 TABLET | Refills: 3 | Status: SHIPPED | OUTPATIENT
Start: 2023-10-16 | End: 2024-10-15

## 2023-10-16 NOTE — PROGRESS NOTES
Chief complaint  Lower back pain , shoulde and knee pain    History  Ms Nunes returned today for follow up office visit      The back pain is on and off.  the pain is both axial and radicular. The axial pain is deep, achy, stabbing, continuous, worse with flexion and side bending. Radicular pain to the upper limbs is shooting down the lateral aspect of the arms and forearms and wrists reaching thumb, index and middle fingers. This is of benefit in characteristi that this more neuropathic with tingling, burning. The pain is deep, achy, stabbing,, associated with electric-like sensation as well. Radicular pain to the lower limbs is of similar characteristics. However, the distribution is on the lateral legs. Calves and the dorsum of the feet.  she is back on track with the medications      she is on schedule with the pain medications and using those as prescribed for the pain control. Denied euphoria associated with the use of the pain medications.   the pain is rated at 8 /10 without the medications. But, with the pain medications the pain level drops to 3/10.      opioids treatment agreement renewed January 2022  Oarrs pulled and scanned in the chart.  She stopped clonazepam at this time and she is getting counseling and she is doing well  Xray updated spine and joints  ORT Score is 0  Pain pathology and pain generators spine and joints     Modalities tried physical therapy, TENS unit, injections, nonsteroidal anti-inflammatory medications  The control of the pain with the pain medications is helping the control of the symptoms and allowing the function and activities of daily living, enjoyment of life, quality of life and sleep with less interruption by the pain. The goal is symptomatic control of the nonmalignant chronic pain and not to repair the permanent damage in the tissues inducing the chronic pain conditions. We are aiming to shift the focus from the nonmalignant chronic pain to other aspects of life by  symptomatically treating this chronic pain.      Review of Systems  Denied any fever or chills. No weight loss and no night sweats. No cough or sputum production. No diarrhea   The constipation has been responding to fibers and over the counter medications.      No bladder and bowel incontinence and no other changes in bladder and bowel. No skin changes. Reports tiredness and fatigability only if the pain is not controlled.   Denied opioids diversion and abuse and denies alcoholism. Denies overuse of the pain medications.        Physical examination  Her daughter came with her  Patient is alert and oriented x3. Pupils are round, equal, reactive to light accommodation. Appeared to be in good spirit.   The speech is coherent well articulated and understood.   Breathing with normal rate and rhythm      she has tight range of motion of the cervical spine and lumbar spine . loading of the lower cervical facet positive with lobato bilateral   similar tight range of motion of the lumbar spine and positive LOBATO bilateral   she has straight leg raising increasing the pain over the lateral legs    [place] negative. No aberrant pain behavior.    Diagnosis     · Primary osteoarthritis of right knee  (M17.11)   · Bilateral lumbar radiculopathy  (M54.16)   · Degeneration of intervertebral disc at L5-S1 level   (M51.37)   . Fibromyalgia     . Left shoulder tendinitis   · Long term current use of opiate analgesic  (Z79.891)       Plan   reviewed the pain generators in the spine in the cervical and lumbar area in the relation of the intervertebral disc and nerve root in pain generation in the neck and limbs  Discussed the mechanism of action of epidural steroid injection spinal cord stimulant and pain medications  Went over the pain medications and mechanism of action and side effects  Home exercises program   renewed prescription hydrocodone but cut back to 5/325 one tablet orally , 3 times a day for the pain control and tizanidine  for muscle relaxant     I explained the side effects from the acetaminophen in the medication and made aware of those. I also explained about the cumulative effects on the organs and mainly the liver.      continue with gabapentin 100 mg o three times a day      Continue with the treatment plan since that is working and controlling the symptoms. the UDS and Oarrs are compliant. No reported side effects and the daily functions and activities of daily living are improved with the pain control. Additionally other modalities including interventional and non opioids modalities were tried without relief to allow improvement of the activities of daily living , quality of life and quality of sleep.      Given the opioids therapy , we discussed about the risk for accidental over dose on the pain medications. I went over the mechanism of action and mode of use of the Naloxone according to the  recommendations.      We went over the COVID19 precautions and importance of following recommendations including social distancing and hand washing and avoiding unnecessary trips and going out of the house     we had a long discussion that the goal is to keep the pain medications to a minimum or possibly come off the medications completely      UDS for compliance check, even with low risk for OUD but we still need the UDS for compliance and confirmation.   long discussion for 10 minutes, above and beyond the office visit, about opioids tolerance and opioids receptors regulation and drug holidays to help against that.      Based on the above findings and the clinical response to the opioids medications and improvement of the activities of daily living, sleep, and work performance. We made this complex decision to continue the opioids therapy in light of the evidence of the patient's responsibility in using the pain medications as prescribed for the nonmalignant chronic pain condition. We discussed about the use of the pain  medications to treat the symptoms of chronic nonmalignant pain and we are not trying the repair the permanent damage in the tissues, rather we are trying to control the symptoms induced by the permanent damage to the tissues inducing the chronic pain condition and resulting disability. I explained the difference and discussed it with the patient and stressed the importance of knowing the difference especially because of the potential side effects and the potential addicting effect and habit forming nature of the dangerous drugs we are using to treat the symptoms of the chronic pain.     The level of clinical decision making in this office visit,  is high, given the high risks of complications with the morbidity and mortality due to the fact that acute and chronic pain may pose a threat to life and bodily function, if under treated, poorly treated, or with failure to maintain adequate treatment and timely medical follow up. Additionally over treatment has its own set of complications including overdosing on the pain medications and also the habit forming potentials with the use of the medications used to treat chronic painful conditions including therapeutic classes classified as dangerous medications. Given the serious and fluctuating nature of pain level and instensity with extensive consideration for whenever pain changes, there is always the risk of prolonged functional impairment requiring close patient monitoring with regular assessments and reassessments and high level medical decision making at every office visit. The amount and complexity of data reviewed is high given the patient clinical presentation, labs,  data, radiology reports, and other tests as discussed during office visits. Pertinent data whether positive or negative were taken in consideration in the process of making this high level medical decision.      We discussed that we are prescribing the medications on good angelo and legitimate  medical reason.       Follow-up 8 weeks or earlier if needed

## 2023-10-20 ENCOUNTER — APPOINTMENT (OUTPATIENT)
Dept: PRIMARY CARE | Facility: CLINIC | Age: 73
End: 2023-10-20
Payer: COMMERCIAL

## 2023-10-23 ENCOUNTER — APPOINTMENT (OUTPATIENT)
Dept: PRIMARY CARE | Facility: CLINIC | Age: 73
End: 2023-10-23
Payer: COMMERCIAL

## 2023-10-30 ENCOUNTER — OFFICE VISIT (OUTPATIENT)
Dept: PRIMARY CARE | Facility: CLINIC | Age: 73
End: 2023-10-30
Payer: COMMERCIAL

## 2023-10-30 VITALS
WEIGHT: 197.4 LBS | DIASTOLIC BLOOD PRESSURE: 70 MMHG | HEART RATE: 81 BPM | OXYGEN SATURATION: 98 % | TEMPERATURE: 97.5 F | SYSTOLIC BLOOD PRESSURE: 138 MMHG | RESPIRATION RATE: 16 BRPM | BODY MASS INDEX: 33.88 KG/M2

## 2023-10-30 DIAGNOSIS — I50.33 ACUTE ON CHRONIC DIASTOLIC CONGESTIVE HEART FAILURE (MULTI): Primary | ICD-10-CM

## 2023-10-30 DIAGNOSIS — J32.9 SINUSITIS, UNSPECIFIED CHRONICITY, UNSPECIFIED LOCATION: ICD-10-CM

## 2023-10-30 DIAGNOSIS — F41.9 ANXIETY: ICD-10-CM

## 2023-10-30 DIAGNOSIS — G89.29 CHRONIC LEFT SHOULDER PAIN: ICD-10-CM

## 2023-10-30 DIAGNOSIS — J44.9 CHRONIC OBSTRUCTIVE PULMONARY DISEASE, UNSPECIFIED COPD TYPE (MULTI): ICD-10-CM

## 2023-10-30 DIAGNOSIS — R20.8 BURNING SENSATION OF FEET: ICD-10-CM

## 2023-10-30 DIAGNOSIS — M25.512 CHRONIC LEFT SHOULDER PAIN: ICD-10-CM

## 2023-10-30 DIAGNOSIS — J01.11 ACUTE RECURRENT FRONTAL SINUSITIS: ICD-10-CM

## 2023-10-30 DIAGNOSIS — E11.9 CONTROLLED TYPE 2 DIABETES MELLITUS WITHOUT COMPLICATION, WITHOUT LONG-TERM CURRENT USE OF INSULIN (MULTI): ICD-10-CM

## 2023-10-30 LAB — POC FINGERSTICK BLOOD GLUCOSE: 147 MG/DL (ref 70–100)

## 2023-10-30 PROCEDURE — 3044F HG A1C LEVEL LT 7.0%: CPT | Performed by: INTERNAL MEDICINE

## 2023-10-30 PROCEDURE — 4010F ACE/ARB THERAPY RXD/TAKEN: CPT | Performed by: INTERNAL MEDICINE

## 2023-10-30 PROCEDURE — 1125F AMNT PAIN NOTED PAIN PRSNT: CPT | Performed by: INTERNAL MEDICINE

## 2023-10-30 PROCEDURE — 1159F MED LIST DOCD IN RCRD: CPT | Performed by: INTERNAL MEDICINE

## 2023-10-30 PROCEDURE — 20610 DRAIN/INJ JOINT/BURSA W/O US: CPT | Performed by: INTERNAL MEDICINE

## 2023-10-30 PROCEDURE — 3075F SYST BP GE 130 - 139MM HG: CPT | Performed by: INTERNAL MEDICINE

## 2023-10-30 PROCEDURE — 3078F DIAST BP <80 MM HG: CPT | Performed by: INTERNAL MEDICINE

## 2023-10-30 PROCEDURE — 82962 GLUCOSE BLOOD TEST: CPT | Performed by: INTERNAL MEDICINE

## 2023-10-30 PROCEDURE — 99214 OFFICE O/P EST MOD 30 MIN: CPT | Performed by: INTERNAL MEDICINE

## 2023-10-30 PROCEDURE — 1036F TOBACCO NON-USER: CPT | Performed by: INTERNAL MEDICINE

## 2023-10-30 RX ORDER — TRIAMCINOLONE ACETONIDE 40 MG/ML
40 INJECTION, SUSPENSION INTRA-ARTICULAR; INTRAMUSCULAR ONCE
Status: COMPLETED | OUTPATIENT
Start: 2023-10-30 | End: 2023-11-30

## 2023-10-30 RX ORDER — IPRATROPIUM BROMIDE AND ALBUTEROL SULFATE 2.5; .5 MG/3ML; MG/3ML
3 SOLUTION RESPIRATORY (INHALATION)
Qty: 180 ML | Refills: 2 | Status: SHIPPED | OUTPATIENT
Start: 2023-10-30 | End: 2024-05-28

## 2023-10-30 RX ORDER — AMOXICILLIN AND CLAVULANATE POTASSIUM 875; 125 MG/1; MG/1
875 TABLET, FILM COATED ORAL 2 TIMES DAILY
Qty: 20 TABLET | Refills: 0 | Status: SHIPPED | OUTPATIENT
Start: 2023-10-30 | End: 2023-11-09

## 2023-10-30 RX ORDER — ALBUTEROL SULFATE 90 UG/1
1 AEROSOL, METERED RESPIRATORY (INHALATION)
Qty: 18 G | Refills: 2 | Status: SHIPPED | OUTPATIENT
Start: 2023-10-30

## 2023-10-30 RX ORDER — GUAIFENESIN 600 MG/1
1200 TABLET, EXTENDED RELEASE ORAL 2 TIMES DAILY
Qty: 120 TABLET | Refills: 11 | Status: SHIPPED | OUTPATIENT
Start: 2023-10-30 | End: 2024-10-29

## 2023-10-30 ASSESSMENT — PAIN SCALES - GENERAL: PAINLEVEL: 8

## 2023-10-30 ASSESSMENT — ENCOUNTER SYMPTOMS: PAIN: 1

## 2023-10-30 NOTE — ASSESSMENT & PLAN NOTE
Most likely the complaint secondary to polyneuropathy patient could not take the gabapentin it given her falls and weakness and dizziness.  She will apply to the foot a light Aspercreme 3 times a day.  Consider a low-dose of Elavil to see if that would help.

## 2023-10-30 NOTE — PROGRESS NOTES
Subjective   Chief complaint: Serjio Nunes is a 73 y.o. female who presents for Pain (Patient has multiple pain sites; left shoulder, bilat knees & neck stiffness.).    HPI:  73 years old black female presented with severe pain in the shoulders increased with movement limited range of motion's been going on for few days.  Pain in the lower extremities burning sensation  Stiffness in the neck.  Postnasal drainage headache cough sinuses tender flushed sore throat cough productive yellow sputum's.    Pain        Objective   /70   Pulse 81   Temp 36.4 °C (97.5 °F)   Resp 16   Wt 89.5 kg (197 lb 6.4 oz)   SpO2 98%   BMI 33.88 kg/m²   Physical Exam  Vitals reviewed.   Constitutional:       Appearance: Normal appearance.   HENT:      Head: Normocephalic and atraumatic.      Comments: And her sinuses was injected nose and throat  Cardiovascular:      Rate and Rhythm: Normal rate and regular rhythm.   Pulmonary:      Effort: Pulmonary effort is normal.      Breath sounds: Normal breath sounds.   Abdominal:      General: Bowel sounds are normal.      Palpations: Abdomen is soft.   Musculoskeletal:      Cervical back: Neck supple.   Skin:     General: Skin is warm and dry.   Neurological:      General: No focal deficit present.      Mental Status: She is alert.   Psychiatric:         Mood and Affect: Mood normal.         Behavior: Behavior is cooperative.         I have reviewed and reconciled the medication list with the patient today.   Current Outpatient Medications:     albuterol 90 mcg/actuation inhaler, Inhale 1 puff 3 times a day., Disp: , Rfl:     amLODIPine (Norvasc) 10 mg tablet, TAKE 1 TABLET BY MOUTH EVERY DAY, Disp: 90 tablet, Rfl: 3    aspirin 81 mg EC tablet, Take 1 tablet (81 mg) by mouth once daily., Disp: , Rfl:     busPIRone (Buspar) 5 mg tablet, TAKE 1 TABLET (5 MG) BY MOUTH 2 TIMES A DAY AS NEEDED (WHEN FEELING ANXIOUS)., Disp: 180 tablet, Rfl: 3    carvedilol (Coreg) 25 mg tablet, TAKE 1  TABLET BY MOUTH TWICE A DAY, Disp: 180 tablet, Rfl: 3    cholecalciferol (Vitamin D-3) 50 MCG (2000 UT) tablet, Take 1 tablet (2,000 Units) by mouth once daily., Disp: , Rfl:     clotrimazole-betamethasone (Lotrisone) cream, Apply 1 Application topically 2 times a day., Disp: , Rfl:     colchicine, gout, 0.6 mg tablet, TAKE 1 TABLET BY MOUTH EVERY DAY, Disp: 90 tablet, Rfl: 1    fluocinonide 0.05 % cream, Apply topically 2 times a day. APPLY SPARINGLY TO AFFECTED AREA, Disp: , Rfl:     fluticasone (Flonase) 50 mcg/actuation nasal spray, Administer 1 spray into each nostril once daily., Disp: , Rfl:     fluticasone propion-salmeteroL (Advair Diskus) 250-50 mcg/dose diskus inhaler, Inhale 1 puff twice a day., Disp: , Rfl:     FreeStyle Jaycob 14 Day Sensor kit, APPLY SENSOR TO BACK UPPER ARM REMOVE AND REPLACE EVERY 14 DAYS USE WITH DEVICE, Disp: 200 each, Rfl: 3    furosemide (Lasix) 40 mg tablet, Take 1 tablet (40 mg) by mouth once daily., Disp: , Rfl:     ipratropium-albuteroL (Duo-Neb) 0.5-2.5 mg/3 mL nebulizer solution, Take 3 mL by nebulization 3 times a day., Disp: , Rfl:     Klor-Con M10 10 mEq ER tablet, TAKE 1 TABLET BY MOUTH EVERY DAY, Disp: 90 tablet, Rfl: 3    losartan (Cozaar) 100 mg tablet, TAKE 1 TABLET BY MOUTH EVERY DAY, Disp: 90 tablet, Rfl: 3    miconazole (Micotin) 2 % cream, miconazole nitrate 2 % topical cream  APPLY SPARINGLY TO AFFECTED AREA TWICE A DAY, Disp: , Rfl:     nitroglycerin (Nitrostat) 0.4 mg SL tablet, Place 1 tablet (0.4 mg) under the tongue every 5 minutes if needed for chest pain., Disp: , Rfl:     tiZANidine (Zanaflex) 4 mg tablet, TAKE 1 TABLET (4 MG) BY MOUTH EVERY 6 HOURS IF NEEDED FOR MUSCLE SPASMS., Disp: 60 tablet, Rfl: 2    Tradjenta 5 mg tablet, TAKE 1 TABLET BY MOUTH EVERY DAY AS DIRECTED, Disp: 90 tablet, Rfl: 1    [START ON 11/1/2023] traMADol (Ultram) 50 mg tablet, Take 1 tablet (50 mg) by mouth every 12 hours if needed for severe pain (7 - 10) for up to 28 days. Do  not start before November 1, 2023., Disp: 56 tablet, Rfl: 1     Imaging:  No results found.     Labs reviewed:    Lab Results   Component Value Date    WBC 9.4 06/20/2023    HGB 13.3 06/20/2023    HCT 43.4 06/20/2023     06/20/2023    CHOL 197 06/20/2023    TRIG 147 06/20/2023    HDL 48.5 06/20/2023    ALT 16 06/20/2023    AST 19 06/20/2023     06/20/2023    K 4.1 06/20/2023     06/20/2023    CREATININE 1.22 (H) 06/20/2023    BUN 14 06/20/2023    CO2 27 06/20/2023    TSH 1.08 06/20/2023    INR 1.0 07/19/2022    HGBA1C 6.3 (A) 06/20/2023       Assessment/Plan   Problem List Items Addressed This Visit       Acute frontal sinusitis     Augmentin  Flonase  Mucinex  Claritin         Acute on chronic diastolic congestive heart failure (CMS/HCC) - Primary    Burning sensation of feet     Most likely the complaint secondary to polyneuropathy patient could not take the gabapentin it given her falls and weakness and dizziness.  She will apply to the foot a light Aspercreme 3 times a day.  Consider a low-dose of Elavil to see if that would help.         Controlled diabetes mellitus (CMS/HCC)     Continue insulin  Continue low carb diet.         Relevant Orders    POCT fingerstick glucose manually resulted (Completed)    Anxiety    Chronic left shoulder pain     I injected left shoulder with Kenalog 40 mg and 1 cc lidocaine  Most likely patient has a rotator cuff tendinitis.         Sinusitis       Continue current medications as listed  Follow up in 3 months

## 2023-10-30 NOTE — ASSESSMENT & PLAN NOTE
I injected left shoulder with Kenalog 40 mg and 1 cc lidocaine  Most likely patient has a rotator cuff tendinitis.

## 2023-11-30 DIAGNOSIS — M50.20 CERVICAL DISC HERNIATION: ICD-10-CM

## 2023-11-30 RX ORDER — TIZANIDINE 4 MG/1
4 TABLET ORAL EVERY 6 HOURS PRN
Qty: 60 TABLET | Refills: 2 | Status: SHIPPED | OUTPATIENT
Start: 2023-11-30 | End: 2023-12-11

## 2023-11-30 RX ADMIN — TRIAMCINOLONE ACETONIDE 40 MG: 40 INJECTION, SUSPENSION INTRA-ARTICULAR; INTRAMUSCULAR at 13:37

## 2023-12-10 DIAGNOSIS — M50.20 CERVICAL DISC HERNIATION: ICD-10-CM

## 2023-12-11 DIAGNOSIS — Z00.00 ENCOUNTER FOR GENERAL ADULT MEDICAL EXAMINATION WITHOUT ABNORMAL FINDINGS: ICD-10-CM

## 2023-12-11 RX ORDER — TIZANIDINE 4 MG/1
4 TABLET ORAL EVERY 6 HOURS PRN
Qty: 90 TABLET | Refills: 1 | Status: SHIPPED | OUTPATIENT
Start: 2023-12-11 | End: 2024-01-26 | Stop reason: SDUPTHER

## 2023-12-12 RX ORDER — CLOTRIMAZOLE AND BETAMETHASONE DIPROPIONATE 10; .64 MG/G; MG/G
CREAM TOPICAL
Qty: 45 G | Refills: 3 | Status: SHIPPED | OUTPATIENT
Start: 2023-12-12

## 2023-12-18 ENCOUNTER — CLINICAL SUPPORT (OUTPATIENT)
Dept: PRIMARY CARE | Facility: CLINIC | Age: 73
End: 2023-12-18
Payer: COMMERCIAL

## 2023-12-18 DIAGNOSIS — N17.9 ACUTE KIDNEY INJURY (CMS-HCC): ICD-10-CM

## 2023-12-18 DIAGNOSIS — D50.9 IRON DEFICIENCY ANEMIA, UNSPECIFIED IRON DEFICIENCY ANEMIA TYPE: ICD-10-CM

## 2023-12-18 DIAGNOSIS — I10 BENIGN ESSENTIAL HYPERTENSION: ICD-10-CM

## 2023-12-18 DIAGNOSIS — R73.09 ABNORMAL GLUCOSE: ICD-10-CM

## 2023-12-18 DIAGNOSIS — E78.5 HYPERLIPIDEMIA, UNSPECIFIED HYPERLIPIDEMIA TYPE: ICD-10-CM

## 2023-12-18 DIAGNOSIS — E78.00 ELEVATED LDL CHOLESTEROL LEVEL: ICD-10-CM

## 2023-12-18 DIAGNOSIS — I10 PRIMARY HYPERTENSION: ICD-10-CM

## 2023-12-18 DIAGNOSIS — E03.9 HYPOTHYROIDISM, UNSPECIFIED TYPE: ICD-10-CM

## 2023-12-18 LAB
ALBUMIN SERPL BCP-MCNC: 4 G/DL (ref 3.4–5)
ALP SERPL-CCNC: 111 U/L (ref 33–136)
ALT SERPL W P-5'-P-CCNC: 13 U/L (ref 7–45)
ANION GAP SERPL CALC-SCNC: 17 MMOL/L (ref 10–20)
AST SERPL W P-5'-P-CCNC: 13 U/L (ref 9–39)
BILIRUB SERPL-MCNC: 0.3 MG/DL (ref 0–1.2)
BUN SERPL-MCNC: 10 MG/DL (ref 6–23)
CALCIUM SERPL-MCNC: 9.9 MG/DL (ref 8.6–10.6)
CHLORIDE SERPL-SCNC: 103 MMOL/L (ref 98–107)
CHOLEST SERPL-MCNC: 254 MG/DL (ref 0–199)
CHOLESTEROL/HDL RATIO: 4.1
CO2 SERPL-SCNC: 26 MMOL/L (ref 21–32)
CREAT SERPL-MCNC: 1.07 MG/DL (ref 0.5–1.05)
ERYTHROCYTE [DISTWIDTH] IN BLOOD BY AUTOMATED COUNT: 14.9 % (ref 11.5–14.5)
EST. AVERAGE GLUCOSE BLD GHB EST-MCNC: 120 MG/DL
GFR SERPL CREATININE-BSD FRML MDRD: 56 ML/MIN/1.73M*2
GLUCOSE SERPL-MCNC: 145 MG/DL (ref 74–99)
HBA1C MFR BLD: 5.8 %
HCT VFR BLD AUTO: 45.5 % (ref 36–46)
HDLC SERPL-MCNC: 62.6 MG/DL
HGB BLD-MCNC: 14 G/DL (ref 12–16)
LDLC SERPL CALC-MCNC: 163 MG/DL
MCH RBC QN AUTO: 29.5 PG (ref 26–34)
MCHC RBC AUTO-ENTMCNC: 30.8 G/DL (ref 32–36)
MCV RBC AUTO: 96 FL (ref 80–100)
NON HDL CHOLESTEROL: 191 MG/DL (ref 0–149)
NRBC BLD-RTO: 0 /100 WBCS (ref 0–0)
PLATELET # BLD AUTO: 318 X10*3/UL (ref 150–450)
POTASSIUM SERPL-SCNC: 3.8 MMOL/L (ref 3.5–5.3)
PROT SERPL-MCNC: 7.1 G/DL (ref 6.4–8.2)
RBC # BLD AUTO: 4.74 X10*6/UL (ref 4–5.2)
SODIUM SERPL-SCNC: 142 MMOL/L (ref 136–145)
TRIGL SERPL-MCNC: 143 MG/DL (ref 0–149)
VLDL: 29 MG/DL (ref 0–40)
WBC # BLD AUTO: 11.5 X10*3/UL (ref 4.4–11.3)

## 2023-12-18 PROCEDURE — 36415 COLL VENOUS BLD VENIPUNCTURE: CPT

## 2023-12-18 PROCEDURE — 85027 COMPLETE CBC AUTOMATED: CPT

## 2023-12-18 PROCEDURE — 80061 LIPID PANEL: CPT

## 2023-12-18 PROCEDURE — 83036 HEMOGLOBIN GLYCOSYLATED A1C: CPT

## 2023-12-18 PROCEDURE — 80053 COMPREHEN METABOLIC PANEL: CPT

## 2023-12-22 ENCOUNTER — APPOINTMENT (OUTPATIENT)
Dept: PRIMARY CARE | Facility: CLINIC | Age: 73
End: 2023-12-22
Payer: COMMERCIAL

## 2023-12-22 ENCOUNTER — OFFICE VISIT (OUTPATIENT)
Dept: PRIMARY CARE | Facility: CLINIC | Age: 73
End: 2023-12-22
Payer: COMMERCIAL

## 2023-12-22 VITALS
DIASTOLIC BLOOD PRESSURE: 78 MMHG | WEIGHT: 193 LBS | SYSTOLIC BLOOD PRESSURE: 128 MMHG | BODY MASS INDEX: 33.13 KG/M2 | HEART RATE: 73 BPM | OXYGEN SATURATION: 94 % | RESPIRATION RATE: 12 BRPM

## 2023-12-22 DIAGNOSIS — J43.8 OTHER EMPHYSEMA (MULTI): ICD-10-CM

## 2023-12-22 DIAGNOSIS — G89.29 CHRONIC LEFT SHOULDER PAIN: ICD-10-CM

## 2023-12-22 DIAGNOSIS — E08.21 DIABETES MELLITUS DUE TO UNDERLYING CONDITION, CONTROLLED, WITH DIABETIC NEPHROPATHY, WITHOUT LONG-TERM CURRENT USE OF INSULIN (MULTI): ICD-10-CM

## 2023-12-22 DIAGNOSIS — M25.512 CHRONIC LEFT SHOULDER PAIN: ICD-10-CM

## 2023-12-22 DIAGNOSIS — M54.2 CERVICAL PAIN: Primary | ICD-10-CM

## 2023-12-22 DIAGNOSIS — M51.37 DEGENERATION OF INTERVERTEBRAL DISC AT L5-S1 LEVEL: ICD-10-CM

## 2023-12-22 PROBLEM — M79.609 PAIN IN EXTREMITY: Status: ACTIVE | Noted: 2023-12-22

## 2023-12-22 PROCEDURE — 3074F SYST BP LT 130 MM HG: CPT | Performed by: INTERNAL MEDICINE

## 2023-12-22 PROCEDURE — 1125F AMNT PAIN NOTED PAIN PRSNT: CPT | Performed by: INTERNAL MEDICINE

## 2023-12-22 PROCEDURE — 4010F ACE/ARB THERAPY RXD/TAKEN: CPT | Performed by: INTERNAL MEDICINE

## 2023-12-22 PROCEDURE — 3044F HG A1C LEVEL LT 7.0%: CPT | Performed by: INTERNAL MEDICINE

## 2023-12-22 PROCEDURE — 3066F NEPHROPATHY DOC TX: CPT | Performed by: INTERNAL MEDICINE

## 2023-12-22 PROCEDURE — 3078F DIAST BP <80 MM HG: CPT | Performed by: INTERNAL MEDICINE

## 2023-12-22 PROCEDURE — 1159F MED LIST DOCD IN RCRD: CPT | Performed by: INTERNAL MEDICINE

## 2023-12-22 PROCEDURE — 1036F TOBACCO NON-USER: CPT | Performed by: INTERNAL MEDICINE

## 2023-12-22 PROCEDURE — 20610 DRAIN/INJ JOINT/BURSA W/O US: CPT | Performed by: INTERNAL MEDICINE

## 2023-12-22 PROCEDURE — 99214 OFFICE O/P EST MOD 30 MIN: CPT | Performed by: INTERNAL MEDICINE

## 2023-12-22 PROCEDURE — 3050F LDL-C >= 130 MG/DL: CPT | Performed by: INTERNAL MEDICINE

## 2023-12-22 RX ADMIN — TRIAMCINOLONE ACETONIDE 40 MG: 40 INJECTION, SUSPENSION INTRA-ARTICULAR; INTRAMUSCULAR at 09:12

## 2023-12-27 DIAGNOSIS — E11.9 TYPE 2 DIABETES MELLITUS WITHOUT COMPLICATIONS (MULTI): ICD-10-CM

## 2023-12-28 ENCOUNTER — APPOINTMENT (OUTPATIENT)
Dept: PAIN MEDICINE | Facility: CLINIC | Age: 73
End: 2023-12-28
Payer: COMMERCIAL

## 2023-12-29 ENCOUNTER — OFFICE VISIT (OUTPATIENT)
Dept: CARDIOLOGY | Facility: CLINIC | Age: 73
End: 2023-12-29
Payer: COMMERCIAL

## 2023-12-29 VITALS
HEIGHT: 63 IN | WEIGHT: 192 LBS | DIASTOLIC BLOOD PRESSURE: 70 MMHG | HEART RATE: 64 BPM | OXYGEN SATURATION: 98 % | SYSTOLIC BLOOD PRESSURE: 148 MMHG | BODY MASS INDEX: 34.02 KG/M2

## 2023-12-29 DIAGNOSIS — E78.00 PURE HYPERCHOLESTEROLEMIA: ICD-10-CM

## 2023-12-29 DIAGNOSIS — I50.33 ACUTE ON CHRONIC DIASTOLIC CONGESTIVE HEART FAILURE (MULTI): Primary | ICD-10-CM

## 2023-12-29 DIAGNOSIS — I35.1 NONRHEUMATIC AORTIC VALVE INSUFFICIENCY: ICD-10-CM

## 2023-12-29 DIAGNOSIS — R07.89 ATYPICAL CHEST PAIN: ICD-10-CM

## 2023-12-29 DIAGNOSIS — I50.32 CHRONIC DIASTOLIC (CONGESTIVE) HEART FAILURE (MULTI): Chronic | ICD-10-CM

## 2023-12-29 DIAGNOSIS — I10 PRIMARY HYPERTENSION: ICD-10-CM

## 2023-12-29 DIAGNOSIS — I11.0 HYPERTENSIVE HEART DISEASE WITH HEART FAILURE (MULTI): Chronic | ICD-10-CM

## 2023-12-29 DIAGNOSIS — I87.2 CHRONIC VENOUS INSUFFICIENCY: ICD-10-CM

## 2023-12-29 DIAGNOSIS — R06.09 DOE (DYSPNEA ON EXERTION): ICD-10-CM

## 2023-12-29 PROCEDURE — 99215 OFFICE O/P EST HI 40 MIN: CPT | Performed by: STUDENT IN AN ORGANIZED HEALTH CARE EDUCATION/TRAINING PROGRAM

## 2023-12-29 PROCEDURE — 3078F DIAST BP <80 MM HG: CPT | Performed by: STUDENT IN AN ORGANIZED HEALTH CARE EDUCATION/TRAINING PROGRAM

## 2023-12-29 PROCEDURE — 1036F TOBACCO NON-USER: CPT | Performed by: STUDENT IN AN ORGANIZED HEALTH CARE EDUCATION/TRAINING PROGRAM

## 2023-12-29 PROCEDURE — 3050F LDL-C >= 130 MG/DL: CPT | Performed by: STUDENT IN AN ORGANIZED HEALTH CARE EDUCATION/TRAINING PROGRAM

## 2023-12-29 PROCEDURE — 1125F AMNT PAIN NOTED PAIN PRSNT: CPT | Performed by: STUDENT IN AN ORGANIZED HEALTH CARE EDUCATION/TRAINING PROGRAM

## 2023-12-29 PROCEDURE — 1159F MED LIST DOCD IN RCRD: CPT | Performed by: STUDENT IN AN ORGANIZED HEALTH CARE EDUCATION/TRAINING PROGRAM

## 2023-12-29 PROCEDURE — 3077F SYST BP >= 140 MM HG: CPT | Performed by: STUDENT IN AN ORGANIZED HEALTH CARE EDUCATION/TRAINING PROGRAM

## 2023-12-29 PROCEDURE — 3044F HG A1C LEVEL LT 7.0%: CPT | Performed by: STUDENT IN AN ORGANIZED HEALTH CARE EDUCATION/TRAINING PROGRAM

## 2023-12-29 PROCEDURE — 4010F ACE/ARB THERAPY RXD/TAKEN: CPT | Performed by: STUDENT IN AN ORGANIZED HEALTH CARE EDUCATION/TRAINING PROGRAM

## 2023-12-29 PROCEDURE — 3066F NEPHROPATHY DOC TX: CPT | Performed by: STUDENT IN AN ORGANIZED HEALTH CARE EDUCATION/TRAINING PROGRAM

## 2023-12-29 RX ORDER — REGADENOSON 0.08 MG/ML
0.4 INJECTION, SOLUTION INTRAVENOUS
Status: CANCELLED | OUTPATIENT
Start: 2023-12-29

## 2023-12-29 RX ORDER — FLASH GLUCOSE SENSOR
KIT MISCELLANEOUS
Qty: 10 EACH | Refills: 3 | Status: SHIPPED | OUTPATIENT
Start: 2023-12-29

## 2023-12-29 RX ORDER — TRAMADOL HYDROCHLORIDE 50 MG/1
50 TABLET ORAL EVERY 6 HOURS PRN
COMMUNITY

## 2023-12-29 ASSESSMENT — ENCOUNTER SYMPTOMS: OCCASIONAL FEELINGS OF UNSTEADINESS: 1

## 2023-12-29 ASSESSMENT — PAIN SCALES - GENERAL: PAINLEVEL: 5

## 2023-12-29 NOTE — PROGRESS NOTES
Follow-up and Chest Pain (Patient reports SOB with exertion and when going up stairs)     HPI:    Serjio Nunes is a 73 y.o. female with pertinent history of hypertension, dyslipidemia, obesity, obstructive sleep apnea, history of DVT, history of chronic venous insufficiency status post vein ablation in the remote past and h/o left breast cancer s/p lumpectomy, chemotherapy and radiation, acute on chronic diastolic heart failure with EDP 25 mmHg on [facility] with non-obstructive CAD on 4/8/16, preserved ejection fraction with impaired relaxation diastolic dysfunction and mild to moderate aortic regurgitation an echo performed 1/8/2021, preserved ejection fraction with impaired relaxation and moderate aortic regurgitation on echo performed 4/14/2023 presents for follow-up.    She is accompanied by her daughter who provides some history and has questions.  She notes some increased dyspnea on exertion limiting activities.  We did discuss her prior echocardiogram and the natural history of aortic regurgitation.  She does also note some sporadic chest discomfort that is worse with exertion.  Only lasts a few seconds and usually resolves with rest.  No exacerbating or relieving factors.  Pt denies orthopnea, and paroxysmal nocturnal dyspnea.  Pt denies worsening lower extremity edema.  Pt denies palpitations or syncope.  No recent falls.  No fever or chills.  No cough.  No change in bowel or bladder habits.  No sick contacts.  No recent travel.    12 point review of systems including (Constitutional, Eyes, ENMT, Respiratory, Cardiac, Gastrointestinal, Neurological, Psychiatric, and Hematologic) was performed and is otherwise negative.    Past medical history reviewed:   has a past medical history of Cervicalgia (09/28/2016), Encounter for general adult medical examination without abnormal findings (10/31/2019), Encounter for general adult medical examination without abnormal findings (04/13/2021), Other conditions  influencing health status (11/05/2019), Pain in unspecified limb (09/28/2016), Personal history of other diseases of the musculoskeletal system and connective tissue (09/28/2016), Personal history of other diseases of the respiratory system (11/05/2019), Personal history of other diseases of the respiratory system (11/05/2019), Personal history of other drug therapy (09/29/2017), Personal history of other specified conditions (11/05/2019), Rash and other nonspecific skin eruption (11/05/2019), and Unspecified abdominal hernia without obstruction or gangrene.    Past surgical history reviewed:   has a past surgical history that includes Shoulder surgery (10/15/2018); Hernia repair (10/15/2018); Foot surgery (09/29/2016); Back surgery (09/29/2016); and Neck surgery (09/29/2016).    Social history reviewed:   reports that she quit smoking about 10 years ago. Her smoking use included cigarettes. She has never used smokeless tobacco. She reports that she does not currently use alcohol. She reports current drug use. Drug: Marijuana.     Family history reviewed:    Family History   Problem Relation Name Age of Onset    Cancer Mother          COLORECTAL    Arthritis Father      Hypertension Father         Allergies reviewed: Strawberry, Iodinated contrast media, Iodine, Other, and Latex     Medications reviewed:   Current Outpatient Medications   Medication Instructions    albuterol 90 mcg/actuation inhaler 1 puff, inhalation, 3 times daily RT    amLODIPine (Norvasc) 10 mg tablet TAKE 1 TABLET BY MOUTH EVERY DAY    aspirin 81 mg EC tablet 1 tablet, oral, Daily    busPIRone (BUSPAR) 5 mg, oral, 2 times daily PRN    carvedilol (Coreg) 25 mg tablet TAKE 1 TABLET BY MOUTH TWICE A DAY    cholecalciferol (VITAMIN D-3) 2,000 Units, oral, Daily RT    clotrimazole-betamethasone (Lotrisone) cream APPLY THIN COAT TO AFFECTED AREA TWICE A DAY IN THE MORNING AND IN THE EVENING    colchicine, gout, 0.6 mg tablet TAKE 1 TABLET BY MOUTH  EVERY DAY    fluocinonide 0.05 % cream Topical, 2 times daily, APPLY SPARINGLY TO AFFECTED AREA    fluticasone (Flonase) 50 mcg/actuation nasal spray 1 spray, Each Nostril, Daily    fluticasone propion-salmeteroL (Advair Diskus) 250-50 mcg/dose diskus inhaler 1 puff, inhalation, 2 times daily    FreeStyle Jaycob 14 Day Sensor kit APPLY SENSOR TO BACK UPPER ARM REMOVE AND REPLACE EVERY 14 DAYS USE WITH DEVICE    furosemide (LASIX) 40 mg, oral, Daily    guaiFENesin (MUCINEX) 1,200 mg, oral, 2 times daily, Do not crush, chew, or split.    ipratropium-albuteroL (Duo-Neb) 0.5-2.5 mg/3 mL nebulizer solution 3 mL, nebulization, 3 times daily RT    Klor-Con M10 10 mEq ER tablet 10 mEq, oral, Daily    losartan (Cozaar) 100 mg tablet TAKE 1 TABLET BY MOUTH EVERY DAY    miconazole (Micotin) 2 % cream miconazole nitrate 2 % topical cream   APPLY SPARINGLY TO AFFECTED AREA TWICE A DAY    nitroglycerin (NITROSTAT) 0.4 mg, sublingual, Every 5 min PRN    tiZANidine (ZANAFLEX) 4 mg, oral, Every 6 hours PRN    Tradjenta 5 mg, oral, Daily, as directed    traMADol (ULTRAM) 50 mg, oral, Every 6 hours PRN        Vitals reviewed: Visit Vitals  /70 (Patient Position: Sitting)   Pulse 64       Physical Exam:   General:  Patient is awake, alert, and oriented.  Patient is in no acute distress.  HEENT:  Pupils equal and reactive.  Normocephalic.  Moist mucosa.    Neck:  No thyromegaly.  Normal Jugular Venous Pressure.  Cardiovascular:  Regular rate and rhythm.  Normal S1 and S2.  2/6 MARY.  Grade 2 diastolic murmur.  Pulmonary:  Clear to auscultation bilaterally.  Abdomen:  Soft. Non-tender.   Non-distended.  Positive bowel sounds.  Lower Extremities:  2+ pedal pulses. No LE edema.  Neurologic:  Cranial nerves intact.  No focal deficit.   Skin: Skin warm and dry, normal skin turgor.   Psychiatric: Normal affect.    Last Labs:  CBC -      Lab Results   Component Value Date    WBC 11.5 (H) 12/18/2023    HGB 14.0 12/18/2023    HCT 45.5  12/18/2023     12/18/2023        CMP-  Lab Results   Component Value Date    GLUCOSE 145 (H) 12/18/2023     12/18/2023    K 3.8 12/18/2023     12/18/2023    CO2 26 12/18/2023    ANIONGAP 17 12/18/2023    BUN 10 12/18/2023    CREATININE 1.07 (H) 12/18/2023    EGFR 56 (L) 12/18/2023    CALCIUM 9.9 12/18/2023    PHOS 2.5 11/09/2022    PROT 7.1 12/18/2023    ALBUMIN 4.0 12/18/2023    AST 13 12/18/2023    ALT 13 12/18/2023    ALKPHOS 111 12/18/2023    BILITOT 0.3 12/18/2023        LIPIDS-  Lab Results   Component Value Date    CHOL 254 (H) 12/18/2023    TRIG 143 12/18/2023    HDL 62.6 12/18/2023    CHHDL 4.1 12/18/2023    VLDL 29 12/18/2023        OTHERS-  Lab Results   Component Value Date    HGBA1C 5.8 (H) 12/18/2023    BNP 82 11/07/2022        I personally reviewed the patient's recent vitals, labs, medications, orders, EKGs, pertinent cardiac imaging/ echocardiography and ischemic evaluations including stress testing/ cardiac catheterization.    Assessment and Plan:    Serjio Nunes is a 73 y.o. female with pertinent history of hypertension, dyslipidemia, obesity, obstructive sleep apnea, history of DVT, history of chronic venous insufficiency status post vein ablation in the remote past and h/o left breast cancer s/p lumpectomy, chemotherapy and radiation, acute on chronic diastolic heart failure with EDP 25 mmHg on [facility] with non-obstructive CAD on 4/8/16, preserved ejection fraction with impaired relaxation diastolic dysfunction and mild to moderate aortic regurgitation an echo performed 1/8/2021, preserved ejection fraction with impaired relaxation and moderate aortic regurgitation on echo performed 4/14/2023 presents for follow-up.  She is accompanied by her daughter who provides some history and has questions.  She notes some increased dyspnea on exertion limiting activities.  We did discuss her prior echocardiogram and the natural history of aortic regurgitation.  She does also note some  sporadic chest discomfort that is worse with exertion.  Only lasts a few seconds and usually resolves with rest.      The patient is unable to complete treadmill excercise stress testing with less than 4 METS of functional capacity due to comorbidities including significant osteoarthritis.  Given the patient's risk factors and symptoms, we will obtain a nuclear pharmacologic stress test for further ischemic evaluation. Please avoid caffeine the day prior to and the day of the stress test. Do not eat the morning of the stress test or 12 hours prior.      We also obtain a cardiac MRI for further evaluation of dyspnea on exertion and aortic regurgitation.    Please continue current cardiac medications including amlodipine 10 mg daily, aspirin 81 mg daily, carvedilol 25 mg twice daily, furosemide 40 mg daily, losartan 100 mg daily, sublingual nitroglycerin as needed for chest pain.    Please followup with me in Cardiology clinic within the next 2 months.  Please return to clinic sooner or seek emergent care if your symptoms reoccur or worsen.    Thank you for allowing me to participate in their care.  Please feel free to call me with any further questions or concerns.        Brody Cantu MD, FACC, ALYCIA PAGE  Division of Cardiovascular Medicine  Medical Director, [facility] [place]  [facility]  Assistant Clinical Professor, Medicine  [employer]  [email or web address]  Office:  [phone]

## 2023-12-29 NOTE — PATIENT INSTRUCTIONS
We will obtain a nuclear pharmacologic stress test for further ischemic evaluation. Please avoid caffeine the day prior to and the day of the stress test. Do not eat the morning of the stress test or 12 hours prior.      We also obtain a cardiac MRI for further evaluation of dyspnea on exertion and aortic regurgitation.    Please continue current cardiac medications including amlodipine 10 mg daily, aspirin 81 mg daily, carvedilol 25 mg twice daily, furosemide 40 mg daily, losartan 100 mg daily, sublingual nitroglycerin as needed for chest pain.    Please followup with me in Cardiology clinic within the next 2 months.  Please return to clinic sooner or seek emergent care if your symptoms reoccur or worsen.

## 2024-01-12 ENCOUNTER — HOSPITAL ENCOUNTER (OUTPATIENT)
Dept: RADIOLOGY | Facility: HOSPITAL | Age: 74
Discharge: HOME | End: 2024-01-12
Payer: COMMERCIAL

## 2024-01-12 ENCOUNTER — HOSPITAL ENCOUNTER (OUTPATIENT)
Dept: CARDIOLOGY | Facility: HOSPITAL | Age: 74
Discharge: HOME | End: 2024-01-12
Payer: COMMERCIAL

## 2024-01-12 DIAGNOSIS — I11.0 HYPERTENSIVE HEART DISEASE WITH HEART FAILURE (MULTI): Chronic | ICD-10-CM

## 2024-01-12 DIAGNOSIS — E78.5 HYPERLIPIDEMIA, UNSPECIFIED: ICD-10-CM

## 2024-01-12 DIAGNOSIS — I50.32 CHRONIC DIASTOLIC (CONGESTIVE) HEART FAILURE (MULTI): Chronic | ICD-10-CM

## 2024-01-12 DIAGNOSIS — I87.2 CHRONIC VENOUS INSUFFICIENCY: ICD-10-CM

## 2024-01-12 DIAGNOSIS — R07.89 ATYPICAL CHEST PAIN: ICD-10-CM

## 2024-01-12 DIAGNOSIS — I50.33 ACUTE ON CHRONIC DIASTOLIC CONGESTIVE HEART FAILURE (MULTI): ICD-10-CM

## 2024-01-12 DIAGNOSIS — I35.1 NONRHEUMATIC AORTIC VALVE INSUFFICIENCY: ICD-10-CM

## 2024-01-12 DIAGNOSIS — E78.00 PURE HYPERCHOLESTEROLEMIA: ICD-10-CM

## 2024-01-12 DIAGNOSIS — R06.09 DOE (DYSPNEA ON EXERTION): ICD-10-CM

## 2024-01-12 DIAGNOSIS — I10 PRIMARY HYPERTENSION: ICD-10-CM

## 2024-01-12 PROCEDURE — 3430000001 HC RX 343 DIAGNOSTIC RADIOPHARMACEUTICALS: Performed by: STUDENT IN AN ORGANIZED HEALTH CARE EDUCATION/TRAINING PROGRAM

## 2024-01-12 PROCEDURE — 93016 CV STRESS TEST SUPVJ ONLY: CPT | Performed by: STUDENT IN AN ORGANIZED HEALTH CARE EDUCATION/TRAINING PROGRAM

## 2024-01-12 PROCEDURE — A9502 TC99M TETROFOSMIN: HCPCS | Performed by: STUDENT IN AN ORGANIZED HEALTH CARE EDUCATION/TRAINING PROGRAM

## 2024-01-12 PROCEDURE — 78452 HT MUSCLE IMAGE SPECT MULT: CPT

## 2024-01-12 PROCEDURE — 78452 HT MUSCLE IMAGE SPECT MULT: CPT | Performed by: STUDENT IN AN ORGANIZED HEALTH CARE EDUCATION/TRAINING PROGRAM

## 2024-01-12 PROCEDURE — 93017 CV STRESS TEST TRACING ONLY: CPT | Mod: MUE

## 2024-01-12 PROCEDURE — 2500000004 HC RX 250 GENERAL PHARMACY W/ HCPCS (ALT 636 FOR OP/ED): Performed by: STUDENT IN AN ORGANIZED HEALTH CARE EDUCATION/TRAINING PROGRAM

## 2024-01-12 PROCEDURE — 93017 CV STRESS TEST TRACING ONLY: CPT

## 2024-01-12 RX ORDER — REGADENOSON 0.08 MG/ML
0.4 INJECTION, SOLUTION INTRAVENOUS
Status: COMPLETED | OUTPATIENT
Start: 2024-01-12 | End: 2024-01-12

## 2024-01-12 RX ADMIN — TETROFOSMIN 10.8 MILLICURIE: 0.23 INJECTION, POWDER, LYOPHILIZED, FOR SOLUTION INTRAVENOUS at 11:14

## 2024-01-12 RX ADMIN — TETROFOSMIN 34 MILLICURIE: 0.23 INJECTION, POWDER, LYOPHILIZED, FOR SOLUTION INTRAVENOUS at 12:35

## 2024-01-12 RX ADMIN — REGADENOSON 0.4 MG: 0.08 INJECTION, SOLUTION INTRAVENOUS at 12:52

## 2024-01-25 ENCOUNTER — APPOINTMENT (OUTPATIENT)
Dept: PAIN MEDICINE | Facility: CLINIC | Age: 74
End: 2024-01-25
Payer: COMMERCIAL

## 2024-01-26 DIAGNOSIS — M50.20 CERVICAL DISC HERNIATION: ICD-10-CM

## 2024-01-27 RX ORDER — TIZANIDINE 4 MG/1
4 TABLET ORAL EVERY 6 HOURS PRN
Qty: 90 TABLET | Refills: 1 | Status: SHIPPED | OUTPATIENT
Start: 2024-01-27 | End: 2024-03-11

## 2024-01-29 ENCOUNTER — HOSPITAL ENCOUNTER (OUTPATIENT)
Dept: RADIOLOGY | Facility: HOSPITAL | Age: 74
End: 2024-01-29
Payer: COMMERCIAL

## 2024-02-11 DIAGNOSIS — I50.33 ACUTE ON CHRONIC DIASTOLIC (CONGESTIVE) HEART FAILURE (MULTI): ICD-10-CM

## 2024-02-12 RX ORDER — FUROSEMIDE 40 MG/1
40 TABLET ORAL DAILY
Qty: 90 TABLET | Refills: 3 | Status: SHIPPED | OUTPATIENT
Start: 2024-02-12

## 2024-02-15 ENCOUNTER — TELEMEDICINE (OUTPATIENT)
Dept: PAIN MEDICINE | Facility: CLINIC | Age: 74
End: 2024-02-15
Payer: COMMERCIAL

## 2024-02-15 DIAGNOSIS — M19.90 ARTHRITIS: Primary | ICD-10-CM

## 2024-02-15 PROCEDURE — 4010F ACE/ARB THERAPY RXD/TAKEN: CPT | Performed by: PHYSICAL MEDICINE & REHABILITATION

## 2024-02-15 PROCEDURE — 1036F TOBACCO NON-USER: CPT | Performed by: PHYSICAL MEDICINE & REHABILITATION

## 2024-02-15 PROCEDURE — 1125F AMNT PAIN NOTED PAIN PRSNT: CPT | Performed by: PHYSICAL MEDICINE & REHABILITATION

## 2024-02-15 PROCEDURE — 99024 POSTOP FOLLOW-UP VISIT: CPT | Performed by: PHYSICAL MEDICINE & REHABILITATION

## 2024-03-07 ENCOUNTER — HOSPITAL ENCOUNTER (OUTPATIENT)
Dept: RADIOLOGY | Facility: HOSPITAL | Age: 74
Discharge: HOME | End: 2024-03-07
Payer: COMMERCIAL

## 2024-03-07 DIAGNOSIS — I87.2 CHRONIC VENOUS INSUFFICIENCY: ICD-10-CM

## 2024-03-07 DIAGNOSIS — I35.1 NONRHEUMATIC AORTIC VALVE INSUFFICIENCY: ICD-10-CM

## 2024-03-07 DIAGNOSIS — E78.00 PURE HYPERCHOLESTEROLEMIA: ICD-10-CM

## 2024-03-07 DIAGNOSIS — I50.33 ACUTE ON CHRONIC DIASTOLIC CONGESTIVE HEART FAILURE (MULTI): ICD-10-CM

## 2024-03-07 DIAGNOSIS — I10 PRIMARY HYPERTENSION: ICD-10-CM

## 2024-03-07 DIAGNOSIS — R06.09 DOE (DYSPNEA ON EXERTION): ICD-10-CM

## 2024-03-07 DIAGNOSIS — I50.32 CHRONIC DIASTOLIC (CONGESTIVE) HEART FAILURE (MULTI): Chronic | ICD-10-CM

## 2024-03-07 DIAGNOSIS — R07.89 ATYPICAL CHEST PAIN: ICD-10-CM

## 2024-03-07 DIAGNOSIS — I11.0 HYPERTENSIVE HEART DISEASE WITH HEART FAILURE (MULTI): Chronic | ICD-10-CM

## 2024-03-07 PROCEDURE — 2550000001 HC RX 255 CONTRASTS: Performed by: STUDENT IN AN ORGANIZED HEALTH CARE EDUCATION/TRAINING PROGRAM

## 2024-03-07 PROCEDURE — 75561 CARDIAC MRI FOR MORPH W/DYE: CPT

## 2024-03-07 PROCEDURE — A9575 INJ GADOTERATE MEGLUMI 0.1ML: HCPCS | Performed by: STUDENT IN AN ORGANIZED HEALTH CARE EDUCATION/TRAINING PROGRAM

## 2024-03-07 PROCEDURE — 75561 CARDIAC MRI FOR MORPH W/DYE: CPT | Performed by: STUDENT IN AN ORGANIZED HEALTH CARE EDUCATION/TRAINING PROGRAM

## 2024-03-07 RX ORDER — GADOTERATE MEGLUMINE 376.9 MG/ML
26 INJECTION INTRAVENOUS
Status: COMPLETED | OUTPATIENT
Start: 2024-03-07 | End: 2024-03-07

## 2024-03-07 RX ADMIN — GADOTERATE MEGLUMINE 26 ML: 376.9 INJECTION INTRAVENOUS at 08:27

## 2024-03-11 DIAGNOSIS — M50.20 CERVICAL DISC HERNIATION: ICD-10-CM

## 2024-03-11 RX ORDER — TIZANIDINE 4 MG/1
4 TABLET ORAL EVERY 6 HOURS PRN
Qty: 90 TABLET | Refills: 1 | Status: SHIPPED | OUTPATIENT
Start: 2024-03-11 | End: 2024-04-29

## 2024-03-18 ENCOUNTER — LAB (OUTPATIENT)
Dept: LAB | Facility: LAB | Age: 74
End: 2024-03-18
Payer: COMMERCIAL

## 2024-03-18 DIAGNOSIS — I50.32 CHRONIC DIASTOLIC (CONGESTIVE) HEART FAILURE (MULTI): Chronic | ICD-10-CM

## 2024-03-18 DIAGNOSIS — I35.1 NONRHEUMATIC AORTIC VALVE INSUFFICIENCY: ICD-10-CM

## 2024-03-18 DIAGNOSIS — I50.33 ACUTE ON CHRONIC DIASTOLIC CONGESTIVE HEART FAILURE (MULTI): ICD-10-CM

## 2024-03-18 DIAGNOSIS — I87.2 CHRONIC VENOUS INSUFFICIENCY: ICD-10-CM

## 2024-03-18 DIAGNOSIS — R07.89 ATYPICAL CHEST PAIN: ICD-10-CM

## 2024-03-18 DIAGNOSIS — I11.0 HYPERTENSIVE HEART DISEASE WITH HEART FAILURE (MULTI): Chronic | ICD-10-CM

## 2024-03-18 DIAGNOSIS — E78.00 PURE HYPERCHOLESTEROLEMIA: ICD-10-CM

## 2024-03-18 DIAGNOSIS — I10 PRIMARY HYPERTENSION: ICD-10-CM

## 2024-03-18 DIAGNOSIS — R06.09 DOE (DYSPNEA ON EXERTION): ICD-10-CM

## 2024-03-18 LAB
ALBUMIN SERPL BCP-MCNC: 4.3 G/DL (ref 3.4–5)
ANION GAP SERPL CALC-SCNC: 14 MMOL/L (ref 10–20)
BNP SERPL-MCNC: 31 PG/ML (ref 0–99)
BUN SERPL-MCNC: 8 MG/DL (ref 6–23)
CALCIUM SERPL-MCNC: 9.7 MG/DL (ref 8.6–10.6)
CHLORIDE SERPL-SCNC: 103 MMOL/L (ref 98–107)
CO2 SERPL-SCNC: 30 MMOL/L (ref 21–32)
CREAT SERPL-MCNC: 1.11 MG/DL (ref 0.5–1.05)
EGFRCR SERPLBLD CKD-EPI 2021: 53 ML/MIN/1.73M*2
GLUCOSE SERPL-MCNC: 142 MG/DL (ref 74–99)
PHOSPHATE SERPL-MCNC: 2.8 MG/DL (ref 2.5–4.9)
POTASSIUM SERPL-SCNC: 3.7 MMOL/L (ref 3.5–5.3)
SODIUM SERPL-SCNC: 143 MMOL/L (ref 136–145)

## 2024-03-18 PROCEDURE — 36415 COLL VENOUS BLD VENIPUNCTURE: CPT

## 2024-03-18 PROCEDURE — 80069 RENAL FUNCTION PANEL: CPT

## 2024-03-18 PROCEDURE — 83880 ASSAY OF NATRIURETIC PEPTIDE: CPT

## 2024-03-19 ENCOUNTER — OFFICE VISIT (OUTPATIENT)
Dept: CARDIOLOGY | Facility: CLINIC | Age: 74
End: 2024-03-19
Payer: COMMERCIAL

## 2024-03-19 VITALS
OXYGEN SATURATION: 95 % | HEIGHT: 64 IN | BODY MASS INDEX: 33.12 KG/M2 | DIASTOLIC BLOOD PRESSURE: 80 MMHG | HEART RATE: 81 BPM | WEIGHT: 194 LBS | SYSTOLIC BLOOD PRESSURE: 118 MMHG

## 2024-03-19 DIAGNOSIS — R06.09 DOE (DYSPNEA ON EXERTION): ICD-10-CM

## 2024-03-19 DIAGNOSIS — R07.89 ATYPICAL CHEST PAIN: ICD-10-CM

## 2024-03-19 DIAGNOSIS — I35.1 NONRHEUMATIC AORTIC VALVE INSUFFICIENCY: ICD-10-CM

## 2024-03-19 DIAGNOSIS — I50.32 CHRONIC DIASTOLIC (CONGESTIVE) HEART FAILURE (MULTI): ICD-10-CM

## 2024-03-19 DIAGNOSIS — E78.00 PURE HYPERCHOLESTEROLEMIA: ICD-10-CM

## 2024-03-19 DIAGNOSIS — I11.0 HYPERTENSIVE HEART DISEASE WITH HEART FAILURE (MULTI): ICD-10-CM

## 2024-03-19 DIAGNOSIS — I50.33 ACUTE ON CHRONIC DIASTOLIC CONGESTIVE HEART FAILURE (MULTI): Primary | ICD-10-CM

## 2024-03-19 DIAGNOSIS — I10 PRIMARY HYPERTENSION: ICD-10-CM

## 2024-03-19 DIAGNOSIS — I87.2 CHRONIC VENOUS INSUFFICIENCY: ICD-10-CM

## 2024-03-19 PROCEDURE — 1036F TOBACCO NON-USER: CPT | Performed by: STUDENT IN AN ORGANIZED HEALTH CARE EDUCATION/TRAINING PROGRAM

## 2024-03-19 PROCEDURE — 3079F DIAST BP 80-89 MM HG: CPT | Performed by: STUDENT IN AN ORGANIZED HEALTH CARE EDUCATION/TRAINING PROGRAM

## 2024-03-19 PROCEDURE — 4010F ACE/ARB THERAPY RXD/TAKEN: CPT | Performed by: STUDENT IN AN ORGANIZED HEALTH CARE EDUCATION/TRAINING PROGRAM

## 2024-03-19 PROCEDURE — 1159F MED LIST DOCD IN RCRD: CPT | Performed by: STUDENT IN AN ORGANIZED HEALTH CARE EDUCATION/TRAINING PROGRAM

## 2024-03-19 PROCEDURE — 99214 OFFICE O/P EST MOD 30 MIN: CPT | Performed by: STUDENT IN AN ORGANIZED HEALTH CARE EDUCATION/TRAINING PROGRAM

## 2024-03-19 PROCEDURE — 3074F SYST BP LT 130 MM HG: CPT | Performed by: STUDENT IN AN ORGANIZED HEALTH CARE EDUCATION/TRAINING PROGRAM

## 2024-03-19 RX ORDER — NITROGLYCERIN 0.4 MG/1
0.4 TABLET SUBLINGUAL EVERY 5 MIN PRN
Qty: 90 TABLET | Refills: 11 | Status: SHIPPED | OUTPATIENT
Start: 2024-03-19

## 2024-03-19 NOTE — PROGRESS NOTES
Follow-up     HPI:    Serjio Nunes is a 73 y.o. female with pertinent history of hypertension, dyslipidemia, obesity, obstructive sleep apnea, history of DVT, history of chronic venous insufficiency status post vein ablation in the remote past and h/o left breast cancer s/p lumpectomy, chemotherapy and radiation, acute on chronic diastolic heart failure with EDP 25 mmHg on [facility] with non-obstructive CAD on 4/8/16, preserved ejection fraction with impaired relaxation diastolic dysfunction and mild to moderate aortic regurgitation an echo performed 1/8/2021, preserved ejection fraction with impaired relaxation and moderate aortic regurgitation on echo performed 4/14/2023, no clear ischemia nuclear pharmacologic stress test performed 1/12/2024, moderate aortic regurgitation with regurgitant fraction of 30%, regurgitant volume of 20%, normal LVEF of 68%, RVEF of 60%, no infiltrative or scarring, lung nodule on cardiac MRI performed 3/7/2024 presents for follow-up.    She is accompanied by her daughter who provides some history and has questions.  Dyspnea on exertion is relatively stable.  She has minimal episodes of chest discomfort and rarely requires sublingual nitroglycerin.  She did request a refill.  Due to prolonged discussion about her recent nuclear stress test as well as cardiac MRI.  We again discussed the natural history of aortic regurgitation and possible interventions.  This point we will monitor symptoms.  No exacerbating or relieving factors.  Pt denies orthopnea, and paroxysmal nocturnal dyspnea.  Pt denies worsening lower extremity edema.  Pt denies palpitations or syncope.  No recent falls.  No fever or chills.  No cough.  No change in bowel or bladder habits.  No sick contacts.  No recent travel.    12 point review of systems including (Constitutional, Eyes, ENMT, Respiratory, Cardiac, Gastrointestinal, Neurological, Psychiatric, and Hematologic) was performed and is otherwise negative.    Past  medical history reviewed:   has a past medical history of Cervicalgia (09/28/2016), Encounter for general adult medical examination without abnormal findings (10/31/2019), Encounter for general adult medical examination without abnormal findings (04/13/2021), Other conditions influencing health status (11/05/2019), Pain in unspecified limb (09/28/2016), Personal history of other diseases of the musculoskeletal system and connective tissue (09/28/2016), Personal history of other diseases of the respiratory system (11/05/2019), Personal history of other diseases of the respiratory system (11/05/2019), Personal history of other drug therapy (09/29/2017), Personal history of other specified conditions (11/05/2019), Rash and other nonspecific skin eruption (11/05/2019), and Unspecified abdominal hernia without obstruction or gangrene.    Past surgical history reviewed:   has a past surgical history that includes Shoulder surgery (10/15/2018); Hernia repair (10/15/2018); Foot surgery (09/29/2016); Back surgery (09/29/2016); and Neck surgery (09/29/2016).    Social history reviewed:   reports that she quit smoking about 11 years ago. Her smoking use included cigarettes. She has never used smokeless tobacco. She reports that she does not currently use alcohol. She reports current drug use. Drug: Marijuana.     Family history reviewed:    Family History   Problem Relation Name Age of Onset    Cancer Mother          COLORECTAL    Arthritis Father      Hypertension Father         Allergies reviewed: Strawberry, Iodinated contrast media, Iodine, Other, and Latex     Medications reviewed:   Current Outpatient Medications   Medication Instructions    albuterol 90 mcg/actuation inhaler 1 puff, inhalation, 3 times daily RT    amLODIPine (Norvasc) 10 mg tablet TAKE 1 TABLET BY MOUTH EVERY DAY    aspirin 81 mg EC tablet 1 tablet, oral, Daily    busPIRone (BUSPAR) 5 mg, oral, 2 times daily PRN    carvedilol (Coreg) 25 mg tablet TAKE 1  TABLET BY MOUTH TWICE A DAY    cholecalciferol (VITAMIN D-3) 2,000 Units, oral, Daily RT    clotrimazole-betamethasone (Lotrisone) cream APPLY THIN COAT TO AFFECTED AREA TWICE A DAY IN THE MORNING AND IN THE EVENING    colchicine, gout, 0.6 mg tablet TAKE 1 TABLET BY MOUTH EVERY DAY    fluocinonide 0.05 % cream Topical, 2 times daily, APPLY SPARINGLY TO AFFECTED AREA    fluticasone (Flonase) 50 mcg/actuation nasal spray 1 spray, Each Nostril, Daily    fluticasone propion-salmeteroL (Advair Diskus) 250-50 mcg/dose diskus inhaler 1 puff, inhalation, 2 times daily    FreeStyle Jaycob 14 Day Sensor kit APPLY SENSOR TO BACK UPPER ARM REMOVE AND REPLACE EVERY 14 DAYS USE WITH DEVICE    furosemide (LASIX) 40 mg, oral, Daily    guaiFENesin (MUCINEX) 1,200 mg, oral, 2 times daily, Do not crush, chew, or split.    ipratropium-albuteroL (Duo-Neb) 0.5-2.5 mg/3 mL nebulizer solution 3 mL, nebulization, 3 times daily RT    Klor-Con M10 10 mEq ER tablet 10 mEq, oral, Daily    losartan (Cozaar) 100 mg tablet TAKE 1 TABLET BY MOUTH EVERY DAY    miconazole (Micotin) 2 % cream miconazole nitrate 2 % topical cream   APPLY SPARINGLY TO AFFECTED AREA TWICE A DAY    nitroglycerin (NITROSTAT) 0.4 mg, sublingual, Every 5 min PRN    tiZANidine (ZANAFLEX) 4 mg, oral, Every 6 hours PRN    Tradjenta 5 mg, oral, Daily, as directed    traMADol (ULTRAM) 50 mg, oral, Every 6 hours PRN        Vitals reviewed: Visit Vitals  /80   Pulse 81       Physical Exam:   General:  Patient is awake, alert, and oriented.  Patient is in no acute distress.  HEENT:  Pupils equal and reactive.  Normocephalic.  Moist mucosa.    Neck:  No thyromegaly.  Normal Jugular Venous Pressure.  Cardiovascular:  Regular rate and rhythm.  Normal S1 and S2.  2/6 MARY.  Grade 2 diastolic murmur.  Pulmonary:  Clear to auscultation bilaterally.  Abdomen:  Soft. Non-tender.   Non-distended.  Positive bowel sounds.  Lower Extremities:  2+ pedal pulses. No LE edema.  Neurologic:   Cranial nerves intact.  No focal deficit.   Skin: Skin warm and dry, normal skin turgor.   Psychiatric: Normal affect.    Last Labs:  CBC -      Lab Results   Component Value Date    WBC 11.5 (H) 12/18/2023    HGB 14.0 12/18/2023    HCT 45.5 12/18/2023     12/18/2023        CMP-  Lab Results   Component Value Date    GLUCOSE 142 (H) 03/18/2024     03/18/2024    K 3.7 03/18/2024     03/18/2024    CO2 30 03/18/2024    ANIONGAP 14 03/18/2024    BUN 8 03/18/2024    CREATININE 1.11 (H) 03/18/2024    EGFR 53 (L) 03/18/2024    CALCIUM 9.7 03/18/2024    PHOS 2.8 03/18/2024    PROT 7.1 12/18/2023    ALBUMIN 4.3 03/18/2024    AST 13 12/18/2023    ALT 13 12/18/2023    ALKPHOS 111 12/18/2023    BILITOT 0.3 12/18/2023        LIPIDS-  Lab Results   Component Value Date    CHOL 254 (H) 12/18/2023    TRIG 143 12/18/2023    HDL 62.6 12/18/2023    CHHDL 4.1 12/18/2023    VLDL 29 12/18/2023        OTHERS-  Lab Results   Component Value Date    HGBA1C 5.8 (H) 12/18/2023    BNP 31 03/18/2024        I personally reviewed the patient's recent vitals, labs, medications, orders, EKGs, pertinent cardiac imaging/ echocardiography and ischemic evaluations including stress testing/ cardiac catheterization.    Assessment and Plan:    Serjio Nunes is a 73 y.o. female with pertinent history of hypertension, dyslipidemia, obesity, obstructive sleep apnea, history of DVT, history of chronic venous insufficiency status post vein ablation in the remote past and h/o left breast cancer s/p lumpectomy, chemotherapy and radiation, acute on chronic diastolic heart failure with EDP 25 mmHg on [facility] with non-obstructive CAD on 4/8/16, preserved ejection fraction with impaired relaxation diastolic dysfunction and mild to moderate aortic regurgitation an echo performed 1/8/2021, preserved ejection fraction with impaired relaxation and moderate aortic regurgitation on echo performed 4/14/2023, no clear ischemia nuclear pharmacologic stress  test performed 1/12/2024, moderate aortic regurgitation with regurgitant fraction of 30%, regurgitant volume of 20%, normal LVEF of 68%, RVEF of 60%, no infiltrative or scarring, lung nodule on cardiac MRI performed 3/7/2024 presents for follow-up.  She is accompanied by her daughter who provides some history and has questions.  Dyspnea on exertion is relatively stable.  She has minimal episodes of chest discomfort and rarely requires sublingual nitroglycerin.  She did request a refill.  Due to prolonged discussion about her recent nuclear stress test as well as cardiac MRI.  We again discussed the natural history of aortic regurgitation and possible interventions.  This point we will monitor symptoms.      Please continue current cardiac medications including amlodipine 10 mg daily, aspirin 81 mg daily, carvedilol 25 mg twice daily, furosemide 40 mg daily, losartan 100 mg daily, sublingual nitroglycerin as needed for chest pain.    Please followup with me in Cardiology clinic within the next 7 months.  Please return to clinic sooner or seek emergent care if your symptoms reoccur or worsen.    Thank you for allowing me to participate in their care.  Please feel free to call me with any further questions or concerns.        Brody Cantu MD, FACC, ALYCIA PAGE  Division of Cardiovascular Medicine  Medical Director, [facility] [place]  [facility]  Assistant Clinical Professor, Medicine  [facility] [employer]  [email or web address]  Office:  [phone]

## 2024-03-19 NOTE — PATIENT INSTRUCTIONS
Please continue current cardiac medications including amlodipine 10 mg daily, aspirin 81 mg daily, carvedilol 25 mg twice daily, furosemide 40 mg daily, losartan 100 mg daily, sublingual nitroglycerin as needed for chest pain.    Please followup with me in Cardiology clinic within the next 7 months.  Please return to clinic sooner or seek emergent care if your symptoms reoccur or worsen.

## 2024-03-23 DIAGNOSIS — Z79.4 TYPE 2 DIABETES MELLITUS WITH OTHER SPECIFIED COMPLICATION, WITH LONG-TERM CURRENT USE OF INSULIN (MULTI): ICD-10-CM

## 2024-03-23 DIAGNOSIS — E11.69 TYPE 2 DIABETES MELLITUS WITH OTHER SPECIFIED COMPLICATION, WITH LONG-TERM CURRENT USE OF INSULIN (MULTI): ICD-10-CM

## 2024-03-25 RX ORDER — LINAGLIPTIN 5 MG/1
5 TABLET, FILM COATED ORAL DAILY
Qty: 90 TABLET | Refills: 1 | Status: SHIPPED | OUTPATIENT
Start: 2024-03-25

## 2024-03-26 RX ORDER — TRIAMCINOLONE ACETONIDE 40 MG/ML
40 INJECTION, SUSPENSION INTRA-ARTICULAR; INTRAMUSCULAR ONCE
Status: COMPLETED | OUTPATIENT
Start: 2023-12-22 | End: 2023-12-22

## 2024-04-19 DIAGNOSIS — I10 HYPERTENSION, UNSPECIFIED TYPE: ICD-10-CM

## 2024-04-19 RX ORDER — CARVEDILOL 25 MG/1
TABLET ORAL
Qty: 180 TABLET | Refills: 3 | Status: SHIPPED | OUTPATIENT
Start: 2024-04-19

## 2024-04-24 DIAGNOSIS — I10 ESSENTIAL (PRIMARY) HYPERTENSION: ICD-10-CM

## 2024-04-24 RX ORDER — LOSARTAN POTASSIUM 100 MG/1
TABLET ORAL
Qty: 90 TABLET | Refills: 3 | Status: SHIPPED | OUTPATIENT
Start: 2024-04-24

## 2024-04-28 DIAGNOSIS — M50.20 CERVICAL DISC HERNIATION: ICD-10-CM

## 2024-04-29 RX ORDER — TIZANIDINE 4 MG/1
4 TABLET ORAL EVERY 6 HOURS PRN
Qty: 90 TABLET | Refills: 1 | Status: SHIPPED | OUTPATIENT
Start: 2024-04-29 | End: 2024-06-28

## 2024-05-11 DIAGNOSIS — I10 ESSENTIAL (PRIMARY) HYPERTENSION: ICD-10-CM

## 2024-05-13 RX ORDER — AMLODIPINE BESYLATE 10 MG/1
TABLET ORAL
Qty: 90 TABLET | Refills: 3 | Status: SHIPPED | OUTPATIENT
Start: 2024-05-13

## 2024-05-25 DIAGNOSIS — J44.9 CHRONIC OBSTRUCTIVE PULMONARY DISEASE, UNSPECIFIED COPD TYPE (MULTI): ICD-10-CM

## 2024-05-28 RX ORDER — IPRATROPIUM BROMIDE AND ALBUTEROL SULFATE 2.5; .5 MG/3ML; MG/3ML
3 SOLUTION RESPIRATORY (INHALATION)
Qty: 180 ML | Refills: 2 | Status: SHIPPED | OUTPATIENT
Start: 2024-05-28

## 2024-06-17 DIAGNOSIS — R91.8 LUNG NODULES: ICD-10-CM

## 2024-06-21 ENCOUNTER — TELEPHONE (OUTPATIENT)
Dept: SLEEP MEDICINE | Facility: CLINIC | Age: 74
End: 2024-06-21
Payer: COMMERCIAL

## 2024-07-06 DIAGNOSIS — M50.20 CERVICAL DISC HERNIATION: ICD-10-CM

## 2024-07-09 ENCOUNTER — APPOINTMENT (OUTPATIENT)
Dept: PRIMARY CARE | Facility: CLINIC | Age: 74
End: 2024-07-09
Payer: COMMERCIAL

## 2024-07-09 VITALS — HEART RATE: 65 BPM | SYSTOLIC BLOOD PRESSURE: 132 MMHG | OXYGEN SATURATION: 98 % | DIASTOLIC BLOOD PRESSURE: 72 MMHG

## 2024-07-09 DIAGNOSIS — I73.9 PVD (PERIPHERAL VASCULAR DISEASE) (CMS-HCC): ICD-10-CM

## 2024-07-09 DIAGNOSIS — M65.9 SYNOVITIS: ICD-10-CM

## 2024-07-09 DIAGNOSIS — M79.7 FIBROMYALGIA: ICD-10-CM

## 2024-07-09 DIAGNOSIS — G57.93 NEUROPATHY INVOLVING BOTH LOWER EXTREMITIES: ICD-10-CM

## 2024-07-09 DIAGNOSIS — R09.89 ABSENT PULSE IN LOWER EXTREMITY: ICD-10-CM

## 2024-07-09 DIAGNOSIS — M1A.0710 CHRONIC GOUT OF RIGHT FOOT, UNSPECIFIED CAUSE: ICD-10-CM

## 2024-07-09 DIAGNOSIS — M25.512 ACUTE PAIN OF LEFT SHOULDER: ICD-10-CM

## 2024-07-09 DIAGNOSIS — F41.9 ANXIETY: ICD-10-CM

## 2024-07-09 DIAGNOSIS — I10 PRIMARY HYPERTENSION: Primary | ICD-10-CM

## 2024-07-09 DIAGNOSIS — E11.9 CONTROLLED TYPE 2 DIABETES MELLITUS WITHOUT COMPLICATION, WITHOUT LONG-TERM CURRENT USE OF INSULIN (MULTI): ICD-10-CM

## 2024-07-09 DIAGNOSIS — R20.8 BURNING SENSATION OF FEET: ICD-10-CM

## 2024-07-09 LAB — POC FINGERSTICK BLOOD GLUCOSE: 175 MG/DL (ref 70–100)

## 2024-07-09 RX ORDER — TIZANIDINE 4 MG/1
4 TABLET ORAL EVERY 6 HOURS PRN
Qty: 90 TABLET | Refills: 1 | Status: SHIPPED | OUTPATIENT
Start: 2024-07-09 | End: 2024-09-07

## 2024-07-09 RX ORDER — PERPHENAZINE 16 MG
2 TABLET ORAL
Qty: 180 CAPSULE | Refills: 3 | Status: SHIPPED | OUTPATIENT
Start: 2024-07-09

## 2024-07-09 RX ORDER — DULOXETIN HYDROCHLORIDE 30 MG/1
30 CAPSULE, DELAYED RELEASE ORAL 2 TIMES DAILY
Qty: 60 CAPSULE | Refills: 5 | Status: SHIPPED | OUTPATIENT
Start: 2024-07-09 | End: 2025-01-05

## 2024-07-09 ASSESSMENT — PAIN SCALES - GENERAL: PAINLEVEL: 6

## 2024-07-09 NOTE — PROGRESS NOTES
Subjective   Chief complaint: Serjio Nunes is a 74 y.o. female who presents for Diabetes (Nerve pain to BLE and BUE; needs refill on medications; circulation problems to BLE and BUE; Fibromyalgia medication refill; gout flare up in BLE.  ).    HPI:  Pt presents with c/o neuropathy. Endorses pain that wakes her upin the night.     Left shoulder - bothering off and on.    Bilateral lower extremity neuropathy - pain, numbness, tingling, heat for 1 year.     Gout flare up right foot.    Poor circulation bilateral lower extremities.    Diabetes        Objective   /72   Pulse 65   SpO2 98%   Physical Exam  Constitutional:       Appearance: Normal appearance. She is normal weight.   HENT:      Head: Normocephalic and atraumatic.      Nose: Nose normal.   Eyes:      Extraocular Movements: Extraocular movements intact.   Cardiovascular:      Rate and Rhythm: Normal rate and regular rhythm.      Heart sounds: Normal heart sounds.      Comments: Absent distal pulses bilaterally  Pulmonary:      Effort: Pulmonary effort is normal.      Breath sounds: Normal breath sounds.   Skin:     General: Skin is dry.   Neurological:      General: No focal deficit present.      Mental Status: She is alert.   Psychiatric:         Mood and Affect: Mood normal.         Behavior: Behavior normal.         Thought Content: Thought content normal.         Judgment: Judgment normal.         I have reviewed and reconciled the medication list with the patient today.   Current Outpatient Medications:     albuterol 90 mcg/actuation inhaler, Inhale 1 puff 3 times a day., Disp: 18 g, Rfl: 2    amLODIPine (Norvasc) 10 mg tablet, TAKE 1 TABLET BY MOUTH EVERY DAY, Disp: 90 tablet, Rfl: 3    aspirin 81 mg EC tablet, Take 1 tablet (81 mg) by mouth once daily., Disp: , Rfl:     busPIRone (Buspar) 5 mg tablet, TAKE 1 TABLET (5 MG) BY MOUTH 2 TIMES A DAY AS NEEDED (WHEN FEELING ANXIOUS)., Disp: 180 tablet, Rfl: 3    carvedilol (Coreg) 25 mg tablet,  TAKE 1 TABLET BY MOUTH TWICE A DAY, Disp: 180 tablet, Rfl: 3    cholecalciferol (Vitamin D-3) 50 MCG (2000 UT) tablet, Take 1 tablet (2,000 Units) by mouth once daily., Disp: , Rfl:     clotrimazole-betamethasone (Lotrisone) cream, APPLY THIN COAT TO AFFECTED AREA TWICE A DAY IN THE MORNING AND IN THE EVENING, Disp: 45 g, Rfl: 3    colchicine, gout, 0.6 mg tablet, TAKE 1 TABLET BY MOUTH EVERY DAY, Disp: 90 tablet, Rfl: 1    fluocinonide 0.05 % cream, Apply topically 2 times a day. APPLY SPARINGLY TO AFFECTED AREA, Disp: , Rfl:     fluticasone (Flonase) 50 mcg/actuation nasal spray, Administer 1 spray into each nostril once daily., Disp: , Rfl:     fluticasone propion-salmeteroL (Advair Diskus) 250-50 mcg/dose diskus inhaler, Inhale 1 puff twice a day., Disp: , Rfl:     FreeStyle Jaycob 14 Day Sensor kit, APPLY SENSOR TO BACK UPPER ARM REMOVE AND REPLACE EVERY 14 DAYS USE WITH DEVICE, Disp: 10 each, Rfl: 3    furosemide (Lasix) 40 mg tablet, TAKE 1 TABLET BY MOUTH EVERY DAY, Disp: 90 tablet, Rfl: 3    guaiFENesin (Mucinex) 600 mg 12 hr tablet, Take 2 tablets (1,200 mg) by mouth 2 times a day. Do not crush, chew, or split., Disp: 120 tablet, Rfl: 11    ipratropium-albuteroL (Duo-Neb) 0.5-2.5 mg/3 mL nebulizer solution, TAKE 3 ML BY NEBULIZATION 3 TIMES A DAY, Disp: 180 mL, Rfl: 2    Klor-Con M10 10 mEq ER tablet, TAKE 1 TABLET BY MOUTH EVERY DAY, Disp: 90 tablet, Rfl: 3    losartan (Cozaar) 100 mg tablet, TAKE 1 TABLET BY MOUTH EVERY DAY, Disp: 90 tablet, Rfl: 3    miconazole (Micotin) 2 % cream, miconazole nitrate 2 % topical cream  APPLY SPARINGLY TO AFFECTED AREA TWICE A DAY, Disp: , Rfl:     nitroglycerin (Nitrostat) 0.4 mg SL tablet, Place 1 tablet (0.4 mg) under the tongue every 5 minutes if needed for chest pain., Disp: 90 tablet, Rfl: 11    Tradjenta 5 mg tablet, TAKE 1 TABLET BY MOUTH EVERY DAY AS DIRECTED, Disp: 90 tablet, Rfl: 1    traMADol (Ultram) 50 mg tablet, Take 1 tablet (50 mg) by mouth every 6 hours  if needed for severe pain (7 - 10)., Disp: , Rfl:     tiZANidine (Zanaflex) 4 mg tablet, TAKE 1 TABLET (4 MG) BY MOUTH EVERY 6 HOURS IF NEEDED FOR MUSCLE SPASMS., Disp: 90 tablet, Rfl: 1     Imaging:  No results found.     Labs reviewed:    Lab Results   Component Value Date    WBC 11.5 (H) 12/18/2023    HGB 14.0 12/18/2023    HCT 45.5 12/18/2023     12/18/2023    CHOL 254 (H) 12/18/2023    TRIG 143 12/18/2023    HDL 62.6 12/18/2023    ALT 13 12/18/2023    AST 13 12/18/2023     03/18/2024    K 3.7 03/18/2024     03/18/2024    CREATININE 1.11 (H) 03/18/2024    BUN 8 03/18/2024    CO2 30 03/18/2024    TSH 1.08 06/20/2023    INR 1.0 07/19/2022    HGBA1C 5.8 (H) 12/18/2023       Assessment/Plan   Problem List Items Addressed This Visit          Cardiac and Vasculature    Hypertension - Primary    Absent pulse in lower extremity     Bilateral absent lower extremity pulses     Referral for VUS with [facility]            Endocrine/Metabolic    Controlled diabetes mellitus (Multi)    Relevant Orders    POCT fingerstick glucose manually resulted (Completed)       Mental Health    Anxiety       Musculoskeletal and Injuries    Fibromyalgia     Duloxetine 30mg         Gout     Colchicine daily         Synovitis       Neuro    Burning sensation of feet     Start alpha lipoic acid 600mg (2 capsules with meals)         Neuropathy, lower extremity   Kenalog given left shoulder     Continue current medications as listed  Follow up in September for annual exam

## 2024-07-10 PROCEDURE — 96372 THER/PROPH/DIAG INJ SC/IM: CPT | Performed by: INTERNAL MEDICINE

## 2024-07-11 ENCOUNTER — OFFICE VISIT (OUTPATIENT)
Dept: PRIMARY CARE | Facility: CLINIC | Age: 74
End: 2024-07-11
Payer: COMMERCIAL

## 2024-07-11 VITALS
OXYGEN SATURATION: 96 % | TEMPERATURE: 97.9 F | SYSTOLIC BLOOD PRESSURE: 162 MMHG | DIASTOLIC BLOOD PRESSURE: 84 MMHG | HEIGHT: 63 IN | BODY MASS INDEX: 32.67 KG/M2 | WEIGHT: 184.4 LBS | HEART RATE: 64 BPM

## 2024-07-11 DIAGNOSIS — R91.8 LUNG NODULES: Primary | ICD-10-CM

## 2024-07-11 DIAGNOSIS — F17.200 SMOKER: ICD-10-CM

## 2024-07-11 PROCEDURE — 1159F MED LIST DOCD IN RCRD: CPT | Performed by: NURSE PRACTITIONER

## 2024-07-11 PROCEDURE — 99202 OFFICE O/P NEW SF 15 MIN: CPT | Performed by: NURSE PRACTITIONER

## 2024-07-11 PROCEDURE — 1160F RVW MEDS BY RX/DR IN RCRD: CPT | Performed by: NURSE PRACTITIONER

## 2024-07-11 PROCEDURE — 99212 OFFICE O/P EST SF 10 MIN: CPT | Performed by: NURSE PRACTITIONER

## 2024-07-11 PROCEDURE — 1126F AMNT PAIN NOTED NONE PRSNT: CPT | Performed by: NURSE PRACTITIONER

## 2024-07-11 PROCEDURE — 1036F TOBACCO NON-USER: CPT | Performed by: NURSE PRACTITIONER

## 2024-07-11 PROCEDURE — 3079F DIAST BP 80-89 MM HG: CPT | Performed by: NURSE PRACTITIONER

## 2024-07-11 PROCEDURE — 4010F ACE/ARB THERAPY RXD/TAKEN: CPT | Performed by: NURSE PRACTITIONER

## 2024-07-11 PROCEDURE — 3077F SYST BP >= 140 MM HG: CPT | Performed by: NURSE PRACTITIONER

## 2024-07-11 RX ORDER — TRIAMCINOLONE ACETONIDE 40 MG/ML
40 INJECTION, SUSPENSION INTRA-ARTICULAR; INTRAMUSCULAR ONCE
Status: COMPLETED | OUTPATIENT
Start: 2024-07-10 | End: 2024-07-10

## 2024-07-11 SDOH — ECONOMIC STABILITY: FOOD INSECURITY: WITHIN THE PAST 12 MONTHS, YOU WORRIED THAT YOUR FOOD WOULD RUN OUT BEFORE YOU GOT MONEY TO BUY MORE.: SOMETIMES TRUE

## 2024-07-11 SDOH — ECONOMIC STABILITY: FOOD INSECURITY: WITHIN THE PAST 12 MONTHS, THE FOOD YOU BOUGHT JUST DIDN'T LAST AND YOU DIDN'T HAVE MONEY TO GET MORE.: SOMETIMES TRUE

## 2024-07-11 ASSESSMENT — COLUMBIA-SUICIDE SEVERITY RATING SCALE - C-SSRS
2. HAVE YOU ACTUALLY HAD ANY THOUGHTS OF KILLING YOURSELF?: NO
1. IN THE PAST MONTH, HAVE YOU WISHED YOU WERE DEAD OR WISHED YOU COULD GO TO SLEEP AND NOT WAKE UP?: NO
6. HAVE YOU EVER DONE ANYTHING, STARTED TO DO ANYTHING, OR PREPARED TO DO ANYTHING TO END YOUR LIFE?: NO

## 2024-07-11 ASSESSMENT — ENCOUNTER SYMPTOMS
DEPRESSION: 1
LOSS OF SENSATION IN FEET: 1
OCCASIONAL FEELINGS OF UNSTEADINESS: 1

## 2024-07-11 ASSESSMENT — PAIN SCALES - GENERAL: PAINLEVEL: 0-NO PAIN

## 2024-07-11 ASSESSMENT — LIFESTYLE VARIABLES
HOW OFTEN DO YOU HAVE A DRINK CONTAINING ALCOHOL: NEVER
HOW OFTEN DO YOU HAVE SIX OR MORE DRINKS ON ONE OCCASION: NEVER
HOW MANY STANDARD DRINKS CONTAINING ALCOHOL DO YOU HAVE ON A TYPICAL DAY: PATIENT DOES NOT DRINK
AUDIT-C TOTAL SCORE: 0
SKIP TO QUESTIONS 9-10: 1

## 2024-07-11 NOTE — PROGRESS NOTES
"Subjective   Patient ID: Serjio Nunes is a 74 y.o. female who presents for New Patient Visit (Serjio has a new visit regarding a lung nodule.  Former smoker and smoked on and off for about 10 years. One pack would last about 4-5 days when she did smoke.  She has a history of breast cancer.  Mother had rectal cancer. ).  HPI 74-year-old female presents today for lung nodule clinic.    Former smoker and smoked on and off for about 10 years. One pack would last about 4-5 days when she did smoke. Occasional smoking at this time.    She has a history of breast cancer.  Mother had rectal cancer.Paternal aunt with lung cancer.     3/7/2024 MR cardiac morphology and function w and wo IV contrast  There is a density appreciated in the right upper lung field measuring 7 mm x 5  mm in diameter poorly characterized with current study.    Review of Systems  Review of systems: Present-feeling well. Not present-chills, fatigue and fever.  Respiratory: Not present-difficulty breathing, cough, bloody sputum.  Cardiovascular: Not present-chest pain, palpitations, dyspnea on exertion.  Objective   /84   Pulse 64   Temp 36.6 °C (97.9 °F)   Ht 1.6 m (5' 3\")   Wt 83.6 kg (184 lb 6.4 oz)   SpO2 96%   BMI 32.66 kg/m²      Physical Exam  Gen.: Mental status-alert. Gen. appearance-cooperative, well groomed and consistent with stated age. Not in acute distress or sickly. Orientation-oriented to time, place, purpose and person. Build and nutrition-well-nourished and well-developed. Hydration-well-hydrated.    Integumentary: Color-normal coloration skin. Skin moisture-normal skin moisture.    Chest and lung exam: Auscultation-normal breath sounds, no adventitious lung sounds and normal vocal resonance. Chest wall is normal in shape and non-tender.    Cardiovascular: Auscultation: Regular rate and rhythm. Heart sounds-normal heart sounds, S1-S2. No murmurs or gallops appreciated. Carotid arteries normal and without " bruit.  Assessment/Plan   Diagnoses and all orders for this visit:  Lung nodules  -     Referral to [facility]  -     CT chest wo IV contrast; Future  Smoker  -     CT chest wo IV contrast; Future

## 2024-07-11 NOTE — PATIENT INSTRUCTIONS
There is a density appreciated in the right upper lung field measuring 7 mm x 5 mm in diameter poorly characterized on MRI cardiac morphology and function with and without IV contrast dated March 7, 2024.  Recommend CT of the chest.  Patient will be notified of results as they become available.    Smoking cessation encouraged.   She declines program and aids at this time.       [facility]    [facility], [address]  [place], [place] [address]  Phone [phone]  Fax [phone]  Nurse Coordinator [phone]                                          Welcome to the [facility]    Today was the initial consult with the lung nodule clinic to determine proper recommendations for follow up. Your care is coordinated to ensure timely management.  As you know, early detection of cancer is very important.  Nodules that are large, look suspicious or have changed over time is why further evaluation such as the additional imaging test that we have ordered is needed. Our clinic will work closely with you in choosing the best next step.       What is my next step?  We will assist with scheduling scans, results reviews, and referrals for priority appointments.      Who do I call?  Your care coordinator for the lung nodule clinic can be contacted at [phone]  All scheduling needs can be assisted within the Cardiac Surgery/Thoracic Surgery/Lung Nodule Clinic offices at [phone].                Table  Palma H, Jorge DP, Anthony LIU, et al. Guidelines for Management of Incidental Pulmonary Nodules Detected on CT Images: From the Fleischner Society 2017. Radiology 2017;284:228-243.

## 2024-07-16 ENCOUNTER — APPOINTMENT (OUTPATIENT)
Dept: PAIN MEDICINE | Facility: CLINIC | Age: 74
End: 2024-07-16
Payer: COMMERCIAL

## 2024-07-18 ENCOUNTER — OFFICE VISIT (OUTPATIENT)
Dept: PRIMARY CARE | Facility: CLINIC | Age: 74
End: 2024-07-18
Payer: COMMERCIAL

## 2024-07-18 VITALS
WEIGHT: 178 LBS | OXYGEN SATURATION: 95 % | RESPIRATION RATE: 12 BRPM | DIASTOLIC BLOOD PRESSURE: 77 MMHG | SYSTOLIC BLOOD PRESSURE: 153 MMHG | BODY MASS INDEX: 31.53 KG/M2 | HEART RATE: 65 BPM

## 2024-07-18 DIAGNOSIS — R20.8 BURNING SENSATION OF FEET: ICD-10-CM

## 2024-07-18 DIAGNOSIS — R11.2 NAUSEA AND VOMITING, UNSPECIFIED VOMITING TYPE: ICD-10-CM

## 2024-07-18 DIAGNOSIS — I10 PRIMARY HYPERTENSION: Primary | ICD-10-CM

## 2024-07-18 DIAGNOSIS — M79.7 FIBROMYALGIA: ICD-10-CM

## 2024-07-18 DIAGNOSIS — F41.9 ANXIETY: ICD-10-CM

## 2024-07-18 DIAGNOSIS — E11.8 CONTROLLED DIABETES MELLITUS TYPE 2 WITH COMPLICATIONS, UNSPECIFIED WHETHER LONG TERM INSULIN USE (MULTI): ICD-10-CM

## 2024-07-18 DIAGNOSIS — F31.32 MODERATE BIPOLAR I DISORDER, MOST RECENT EPISODE DEPRESSED (MULTI): Chronic | ICD-10-CM

## 2024-07-18 LAB — POC FINGERSTICK BLOOD GLUCOSE: 149 MG/DL (ref 70–100)

## 2024-07-18 PROCEDURE — 99214 OFFICE O/P EST MOD 30 MIN: CPT | Performed by: INTERNAL MEDICINE

## 2024-07-18 PROCEDURE — 3077F SYST BP >= 140 MM HG: CPT | Performed by: INTERNAL MEDICINE

## 2024-07-18 PROCEDURE — 1159F MED LIST DOCD IN RCRD: CPT | Performed by: INTERNAL MEDICINE

## 2024-07-18 PROCEDURE — 4010F ACE/ARB THERAPY RXD/TAKEN: CPT | Performed by: INTERNAL MEDICINE

## 2024-07-18 PROCEDURE — 3078F DIAST BP <80 MM HG: CPT | Performed by: INTERNAL MEDICINE

## 2024-07-18 PROCEDURE — 82962 GLUCOSE BLOOD TEST: CPT | Performed by: INTERNAL MEDICINE

## 2024-07-18 PROCEDURE — 1036F TOBACCO NON-USER: CPT | Performed by: INTERNAL MEDICINE

## 2024-07-18 RX ORDER — ONDANSETRON 4 MG/1
4 TABLET, FILM COATED ORAL 2 TIMES DAILY
Qty: 60 TABLET | Refills: 0 | Status: SHIPPED | OUTPATIENT
Start: 2024-07-18

## 2024-07-18 NOTE — ASSESSMENT & PLAN NOTE
Continue duloxetine for benefit     Start Zofran 4mg before meals BID for nausea  Continue stool softener for constipation

## 2024-07-18 NOTE — PROGRESS NOTES
Subjective   Chief complaint: Serjio Nunes is a 74 y.o. female who presents for Follow-up (Pt states she is vomiting and can't eat from taking cymbalta ).    HPI:  Pt presents for 2 days of vomiting and nausea + dry heaves. Believes to be related to new medication started 2 days ago - duloxetine. Denies chills and headache. Endorses feeling hot and sweaty with the nausea. Waking up in cold sweats.     Although she believes this medicine helped with fibromyalgia and feet swelling. Was able to wear shoes.      Reports incident of feeling very angry at her roommate yesterday.        Objective   /77   Pulse 65   Resp 12   Wt 80.7 kg (178 lb)   SpO2 95%   BMI 31.53 kg/m²   Physical Exam  Constitutional:       Appearance: Normal appearance.   HENT:      Head: Normocephalic and atraumatic.      Right Ear: External ear normal.      Left Ear: External ear normal.   Eyes:      Extraocular Movements: Extraocular movements intact.   Cardiovascular:      Rate and Rhythm: Normal rate and regular rhythm.   Pulmonary:      Breath sounds: Normal breath sounds.   Skin:     General: Skin is dry.   Neurological:      General: No focal deficit present.      Mental Status: She is alert.   Psychiatric:         Mood and Affect: Mood normal.         Thought Content: Thought content normal.         Judgment: Judgment normal.         I have reviewed and reconciled the medication list with the patient today.   Current Outpatient Medications:     albuterol 90 mcg/actuation inhaler, Inhale 1 puff 3 times a day., Disp: 18 g, Rfl: 2    alpha lipoic acid 600 mg capsule, Take 2 capsules by mouth 3 times a day before meals., Disp: 180 capsule, Rfl: 3    amLODIPine (Norvasc) 10 mg tablet, TAKE 1 TABLET BY MOUTH EVERY DAY, Disp: 90 tablet, Rfl: 3    aspirin 81 mg EC tablet, Take 1 tablet (81 mg) by mouth once daily., Disp: , Rfl:     busPIRone (Buspar) 5 mg tablet, TAKE 1 TABLET (5 MG) BY MOUTH 2 TIMES A DAY AS NEEDED (WHEN FEELING  ANXIOUS)., Disp: 180 tablet, Rfl: 3    carvedilol (Coreg) 25 mg tablet, TAKE 1 TABLET BY MOUTH TWICE A DAY, Disp: 180 tablet, Rfl: 3    cholecalciferol (Vitamin D-3) 50 MCG (2000 UT) tablet, Take 1 tablet (2,000 Units) by mouth once daily., Disp: , Rfl:     clotrimazole-betamethasone (Lotrisone) cream, APPLY THIN COAT TO AFFECTED AREA TWICE A DAY IN THE MORNING AND IN THE EVENING, Disp: 45 g, Rfl: 3    colchicine, gout, 0.6 mg tablet, TAKE 1 TABLET BY MOUTH EVERY DAY, Disp: 90 tablet, Rfl: 1    DULoxetine (Cymbalta) 30 mg DR capsule, Take 1 capsule (30 mg) by mouth 2 times a day. Do not crush or chew., Disp: 60 capsule, Rfl: 5    fluocinonide 0.05 % cream, Apply topically 2 times a day. APPLY SPARINGLY TO AFFECTED AREA, Disp: , Rfl:     fluticasone (Flonase) 50 mcg/actuation nasal spray, Administer 1 spray into each nostril once daily., Disp: , Rfl:     fluticasone propion-salmeteroL (Advair Diskus) 250-50 mcg/dose diskus inhaler, Inhale 1 puff twice a day., Disp: , Rfl:     FreeStyle Jaycob 14 Day Sensor kit, APPLY SENSOR TO BACK UPPER ARM REMOVE AND REPLACE EVERY 14 DAYS USE WITH DEVICE, Disp: 10 each, Rfl: 3    furosemide (Lasix) 40 mg tablet, TAKE 1 TABLET BY MOUTH EVERY DAY, Disp: 90 tablet, Rfl: 3    guaiFENesin (Mucinex) 600 mg 12 hr tablet, Take 2 tablets (1,200 mg) by mouth 2 times a day. Do not crush, chew, or split., Disp: 120 tablet, Rfl: 11    ipratropium-albuteroL (Duo-Neb) 0.5-2.5 mg/3 mL nebulizer solution, TAKE 3 ML BY NEBULIZATION 3 TIMES A DAY, Disp: 180 mL, Rfl: 2    Klor-Con M10 10 mEq ER tablet, TAKE 1 TABLET BY MOUTH EVERY DAY, Disp: 90 tablet, Rfl: 3    losartan (Cozaar) 100 mg tablet, TAKE 1 TABLET BY MOUTH EVERY DAY, Disp: 90 tablet, Rfl: 3    miconazole (Micotin) 2 % cream, miconazole nitrate 2 % topical cream  APPLY SPARINGLY TO AFFECTED AREA TWICE A DAY, Disp: , Rfl:     nitroglycerin (Nitrostat) 0.4 mg SL tablet, Place 1 tablet (0.4 mg) under the tongue every 5 minutes if needed for  chest pain., Disp: 90 tablet, Rfl: 11    tiZANidine (Zanaflex) 4 mg tablet, TAKE 1 TABLET (4 MG) BY MOUTH EVERY 6 HOURS IF NEEDED FOR MUSCLE SPASMS, Disp: 90 tablet, Rfl: 1    Tradjenta 5 mg tablet, TAKE 1 TABLET BY MOUTH EVERY DAY AS DIRECTED, Disp: 90 tablet, Rfl: 1    traMADol (Ultram) 50 mg tablet, Take 1 tablet (50 mg) by mouth every 6 hours if needed for severe pain (7 - 10)., Disp: , Rfl:      Imaging:  No results found.     Labs reviewed:    Lab Results   Component Value Date    WBC 11.5 (H) 12/18/2023    HGB 14.0 12/18/2023    HCT 45.5 12/18/2023     12/18/2023    CHOL 254 (H) 12/18/2023    TRIG 143 12/18/2023    HDL 62.6 12/18/2023    ALT 13 12/18/2023    AST 13 12/18/2023     03/18/2024    K 3.7 03/18/2024     03/18/2024    CREATININE 1.11 (H) 03/18/2024    BUN 8 03/18/2024    CO2 30 03/18/2024    TSH 1.08 06/20/2023    INR 1.0 07/19/2022    HGBA1C 5.8 (H) 12/18/2023       Assessment/Plan   Problem List Items Addressed This Visit       Burning sensation of feet    Fibromyalgia     Continue duloxetine 30mg BID for benefit     Supplement with zofran 4mg before meals    Continue alpha-lipoic acid 600mg 2 capsule TID         Hypertension - Primary    Controlled diabetes mellitus (Multi)    Relevant Orders    POCT fingerstick glucose manually resulted (Completed)    Moderate bipolar I disorder, most recent episode depressed (Multi) (Chronic)    Anxiety    Nausea & vomiting     Continue duloxetine for benefit     Start Zofran 4mg before meals BID for nausea  Continue stool softener for constipation            Continue current medications as listed  Follow up as previously discussed.

## 2024-07-26 ENCOUNTER — HOSPITAL ENCOUNTER (OUTPATIENT)
Dept: VASCULAR MEDICINE | Facility: CLINIC | Age: 74
Discharge: HOME | End: 2024-07-26
Payer: COMMERCIAL

## 2024-07-26 ENCOUNTER — HOSPITAL ENCOUNTER (OUTPATIENT)
Dept: RADIOLOGY | Facility: CLINIC | Age: 74
Discharge: HOME | End: 2024-07-26
Payer: COMMERCIAL

## 2024-07-26 DIAGNOSIS — R91.8 LUNG NODULES: ICD-10-CM

## 2024-07-26 DIAGNOSIS — R20.8 BURNING SENSATION OF FEET: ICD-10-CM

## 2024-07-26 DIAGNOSIS — F17.200 SMOKER: ICD-10-CM

## 2024-07-26 DIAGNOSIS — I73.9 PVD (PERIPHERAL VASCULAR DISEASE) (CMS-HCC): ICD-10-CM

## 2024-07-26 PROCEDURE — 93923 UPR/LXTR ART STDY 3+ LVLS: CPT | Performed by: INTERNAL MEDICINE

## 2024-07-26 PROCEDURE — 71250 CT THORAX DX C-: CPT

## 2024-07-26 PROCEDURE — 93923 UPR/LXTR ART STDY 3+ LVLS: CPT

## 2024-08-05 ENCOUNTER — OFFICE VISIT (OUTPATIENT)
Dept: PRIMARY CARE | Facility: CLINIC | Age: 74
End: 2024-08-05
Payer: COMMERCIAL

## 2024-08-05 VITALS
BODY MASS INDEX: 29.76 KG/M2 | DIASTOLIC BLOOD PRESSURE: 58 MMHG | SYSTOLIC BLOOD PRESSURE: 122 MMHG | OXYGEN SATURATION: 95 % | HEART RATE: 72 BPM | RESPIRATION RATE: 12 BRPM | WEIGHT: 168 LBS

## 2024-08-05 DIAGNOSIS — E87.6 HYPOKALEMIA: ICD-10-CM

## 2024-08-05 DIAGNOSIS — E07.9 THYROID MASS: ICD-10-CM

## 2024-08-05 DIAGNOSIS — R92.30 DENSE BREASTS, UNSPECIFIED: ICD-10-CM

## 2024-08-05 DIAGNOSIS — K29.00 ACUTE GASTRITIS WITHOUT HEMORRHAGE, UNSPECIFIED GASTRITIS TYPE: Primary | ICD-10-CM

## 2024-08-05 DIAGNOSIS — Z00.00 ENCOUNTER FOR ANNUAL WELLNESS VISIT (AWV) IN MEDICARE PATIENT: ICD-10-CM

## 2024-08-05 DIAGNOSIS — N17.9 ACUTE KIDNEY INJURY (CMS-HCC): ICD-10-CM

## 2024-08-05 DIAGNOSIS — I10 PRIMARY HYPERTENSION: ICD-10-CM

## 2024-08-05 DIAGNOSIS — R05.9 COUGH, UNSPECIFIED TYPE: ICD-10-CM

## 2024-08-05 DIAGNOSIS — K59.00 CONSTIPATION, UNSPECIFIED CONSTIPATION TYPE: ICD-10-CM

## 2024-08-05 DIAGNOSIS — F41.9 ANXIETY: ICD-10-CM

## 2024-08-05 DIAGNOSIS — E03.9 HYPOTHYROIDISM, UNSPECIFIED TYPE: ICD-10-CM

## 2024-08-05 DIAGNOSIS — R11.2 NAUSEA AND VOMITING, UNSPECIFIED VOMITING TYPE: ICD-10-CM

## 2024-08-05 DIAGNOSIS — E11.8 CONTROLLED DIABETES MELLITUS TYPE 2 WITH COMPLICATIONS, UNSPECIFIED WHETHER LONG TERM INSULIN USE (MULTI): ICD-10-CM

## 2024-08-05 DIAGNOSIS — R50.9 FEVER, UNSPECIFIED: ICD-10-CM

## 2024-08-05 DIAGNOSIS — E55.9 VITAMIN D DEFICIENCY: ICD-10-CM

## 2024-08-05 DIAGNOSIS — J18.9 ATYPICAL PNEUMONIA: ICD-10-CM

## 2024-08-05 DIAGNOSIS — R91.8 LUNG NODULES: ICD-10-CM

## 2024-08-05 DIAGNOSIS — E78.00 ELEVATED LDL CHOLESTEROL LEVEL: ICD-10-CM

## 2024-08-05 DIAGNOSIS — N28.89 KIDNEY MASS: ICD-10-CM

## 2024-08-05 DIAGNOSIS — N63.20 MASS OF LEFT BREAST, UNSPECIFIED QUADRANT: ICD-10-CM

## 2024-08-05 DIAGNOSIS — E78.00 PURE HYPERCHOLESTEROLEMIA: ICD-10-CM

## 2024-08-05 DIAGNOSIS — D50.9 IRON DEFICIENCY ANEMIA, UNSPECIFIED IRON DEFICIENCY ANEMIA TYPE: ICD-10-CM

## 2024-08-05 DIAGNOSIS — K21.9 GASTROESOPHAGEAL REFLUX DISEASE WITHOUT ESOPHAGITIS: ICD-10-CM

## 2024-08-05 DIAGNOSIS — I10 BENIGN ESSENTIAL HYPERTENSION: ICD-10-CM

## 2024-08-05 PROBLEM — K29.70 GASTRITIS WITHOUT BLEEDING: Status: ACTIVE | Noted: 2024-08-05

## 2024-08-05 LAB
25(OH)D3 SERPL-MCNC: 70 NG/ML (ref 30–100)
ALBUMIN SERPL BCP-MCNC: 4.4 G/DL (ref 3.4–5)
ALP SERPL-CCNC: 82 U/L (ref 33–136)
ALT SERPL W P-5'-P-CCNC: 21 U/L (ref 7–45)
ANION GAP SERPL CALC-SCNC: 20 MMOL/L (ref 10–20)
AST SERPL W P-5'-P-CCNC: 16 U/L (ref 9–39)
BILIRUB SERPL-MCNC: 0.6 MG/DL (ref 0–1.2)
BUN SERPL-MCNC: 18 MG/DL (ref 6–23)
CALCIUM SERPL-MCNC: 10.2 MG/DL (ref 8.6–10.6)
CHLORIDE SERPL-SCNC: 100 MMOL/L (ref 98–107)
CHOLEST SERPL-MCNC: 234 MG/DL (ref 0–199)
CHOLESTEROL/HDL RATIO: 6.8
CO2 SERPL-SCNC: 24 MMOL/L (ref 21–32)
CREAT SERPL-MCNC: 1.33 MG/DL (ref 0.5–1.05)
EGFRCR SERPLBLD CKD-EPI 2021: 43 ML/MIN/1.73M*2
ERYTHROCYTE [DISTWIDTH] IN BLOOD BY AUTOMATED COUNT: 14.8 % (ref 11.5–14.5)
GLUCOSE SERPL-MCNC: 148 MG/DL (ref 74–99)
HCT VFR BLD AUTO: 43.1 % (ref 36–46)
HDLC SERPL-MCNC: 34.6 MG/DL
HGB BLD-MCNC: 13.5 G/DL (ref 12–16)
LDLC SERPL CALC-MCNC: 150 MG/DL
MCH RBC QN AUTO: 29.3 PG (ref 26–34)
MCHC RBC AUTO-ENTMCNC: 31.3 G/DL (ref 32–36)
MCV RBC AUTO: 94 FL (ref 80–100)
NON HDL CHOLESTEROL: 199 MG/DL (ref 0–149)
NRBC BLD-RTO: 0 /100 WBCS (ref 0–0)
PLATELET # BLD AUTO: 123 X10*3/UL (ref 150–450)
POC FINGERSTICK BLOOD GLUCOSE: 139 MG/DL (ref 70–100)
POTASSIUM SERPL-SCNC: 3.9 MMOL/L (ref 3.5–5.3)
PROT SERPL-MCNC: 7.6 G/DL (ref 6.4–8.2)
RBC # BLD AUTO: 4.61 X10*6/UL (ref 4–5.2)
SODIUM SERPL-SCNC: 140 MMOL/L (ref 136–145)
TRIGL SERPL-MCNC: 249 MG/DL (ref 0–149)
TSH SERPL-ACNC: 2.64 MIU/L (ref 0.44–3.98)
VLDL: 50 MG/DL (ref 0–40)
WBC # BLD AUTO: 15.9 X10*3/UL (ref 4.4–11.3)

## 2024-08-05 PROCEDURE — 36415 COLL VENOUS BLD VENIPUNCTURE: CPT

## 2024-08-05 PROCEDURE — 84443 ASSAY THYROID STIM HORMONE: CPT

## 2024-08-05 PROCEDURE — 1159F MED LIST DOCD IN RCRD: CPT | Performed by: INTERNAL MEDICINE

## 2024-08-05 PROCEDURE — 80061 LIPID PANEL: CPT

## 2024-08-05 PROCEDURE — 80053 COMPREHEN METABOLIC PANEL: CPT

## 2024-08-05 PROCEDURE — 82962 GLUCOSE BLOOD TEST: CPT | Performed by: INTERNAL MEDICINE

## 2024-08-05 PROCEDURE — 1036F TOBACCO NON-USER: CPT | Performed by: INTERNAL MEDICINE

## 2024-08-05 PROCEDURE — 99214 OFFICE O/P EST MOD 30 MIN: CPT | Performed by: INTERNAL MEDICINE

## 2024-08-05 PROCEDURE — 3078F DIAST BP <80 MM HG: CPT | Performed by: INTERNAL MEDICINE

## 2024-08-05 PROCEDURE — 82306 VITAMIN D 25 HYDROXY: CPT

## 2024-08-05 PROCEDURE — 85027 COMPLETE CBC AUTOMATED: CPT

## 2024-08-05 PROCEDURE — 4010F ACE/ARB THERAPY RXD/TAKEN: CPT | Performed by: INTERNAL MEDICINE

## 2024-08-05 PROCEDURE — 83036 HEMOGLOBIN GLYCOSYLATED A1C: CPT

## 2024-08-05 PROCEDURE — 3074F SYST BP LT 130 MM HG: CPT | Performed by: INTERNAL MEDICINE

## 2024-08-05 RX ORDER — DOXYCYCLINE 100 MG/1
100 CAPSULE ORAL 2 TIMES DAILY
Qty: 20 CAPSULE | Refills: 0 | Status: SHIPPED | OUTPATIENT
Start: 2024-08-05 | End: 2024-08-15

## 2024-08-05 RX ORDER — OMEPRAZOLE 20 MG/1
20 TABLET, DELAYED RELEASE ORAL DAILY
COMMUNITY
Start: 2024-08-05 | End: 2025-08-05

## 2024-08-05 RX ORDER — PROMETHAZINE HYDROCHLORIDE 6.25 MG/5ML
12.5 SYRUP ORAL EVERY 6 HOURS PRN
Qty: 120 ML | Refills: 0 | Status: SHIPPED | OUTPATIENT
Start: 2024-08-05 | End: 2024-08-06

## 2024-08-05 NOTE — ASSESSMENT & PLAN NOTE
Drug induced from Duloxetine and Alpha lipoic acid   Discontinue Alpha Lipoic Acid  Decrease Duloxetine to 1x per day for one week then discontinue  Discontinue Zofran  Start Omeprazole 20mg/day 2x per day

## 2024-08-05 NOTE — PROGRESS NOTES
Subjective   Chief complaint: Serjio Nunes is a 74 y.o. female who presents for Follow-up (.  Patient complains of vomiting after taking alpha lopic and duloxetine).    HPI:  Patient here for complaints of nausea for the past one month. She had just started taking Duloxetine and Alpha lipoic acid. She states she is vomiting 5x per day, and does not tolerate solids or liquids. She has tried Zofran with no relief. She has lost approximately 10 pounds since her last visit one month ago. She is also complaining of sweating spells that happen daily. She also has not had a bowel movement since July 9. She received a CT chest on 7/11 and results were discussed with patient and her family member.         Objective   /58   Pulse 72   Resp 12   Wt 76.2 kg (168 lb)   SpO2 95%   BMI 29.76 kg/m²   Physical Exam  HENT:      Head: Normocephalic.      Nose: Nose normal.      Mouth/Throat:      Mouth: Mucous membranes are moist.      Pharynx: Oropharynx is clear.   Eyes:      Pupils: Pupils are equal, round, and reactive to light.   Cardiovascular:      Rate and Rhythm: Normal rate.      Pulses: Normal pulses.   Pulmonary:      Effort: Pulmonary effort is normal.      Breath sounds: Normal breath sounds.   Abdominal:      Tenderness: There is abdominal tenderness.      Comments: Tenderness in the epigastric area   Musculoskeletal:         General: Normal range of motion.      Cervical back: Normal range of motion.   Skin:     General: Skin is warm and dry.      Capillary Refill: Capillary refill takes less than 2 seconds.   Neurological:      Mental Status: She is alert and oriented to person, place, and time.         I have reviewed and reconciled the medication list with the patient today.   Current Outpatient Medications:     albuterol 90 mcg/actuation inhaler, Inhale 1 puff 3 times a day., Disp: 18 g, Rfl: 2    amLODIPine (Norvasc) 10 mg tablet, TAKE 1 TABLET BY MOUTH EVERY DAY, Disp: 90 tablet, Rfl: 3    aspirin  81 mg EC tablet, Take 1 tablet (81 mg) by mouth once daily., Disp: , Rfl:     busPIRone (Buspar) 5 mg tablet, TAKE 1 TABLET (5 MG) BY MOUTH 2 TIMES A DAY AS NEEDED (WHEN FEELING ANXIOUS)., Disp: 180 tablet, Rfl: 3    carvedilol (Coreg) 25 mg tablet, TAKE 1 TABLET BY MOUTH TWICE A DAY, Disp: 180 tablet, Rfl: 3    cholecalciferol (Vitamin D-3) 50 MCG (2000 UT) tablet, Take 1 tablet (2,000 Units) by mouth once daily., Disp: , Rfl:     clotrimazole-betamethasone (Lotrisone) cream, APPLY THIN COAT TO AFFECTED AREA TWICE A DAY IN THE MORNING AND IN THE EVENING, Disp: 45 g, Rfl: 3    colchicine, gout, 0.6 mg tablet, TAKE 1 TABLET BY MOUTH EVERY DAY, Disp: 90 tablet, Rfl: 1    DULoxetine (Cymbalta) 30 mg DR capsule, Take 1 capsule (30 mg) by mouth 2 times a day. Do not crush or chew., Disp: 60 capsule, Rfl: 5    fluocinonide 0.05 % cream, Apply topically 2 times a day. APPLY SPARINGLY TO AFFECTED AREA, Disp: , Rfl:     fluticasone (Flonase) 50 mcg/actuation nasal spray, Administer 1 spray into each nostril once daily., Disp: , Rfl:     fluticasone propion-salmeteroL (Advair Diskus) 250-50 mcg/dose diskus inhaler, Inhale 1 puff twice a day., Disp: , Rfl:     FreeStyle Jaycob 14 Day Sensor kit, APPLY SENSOR TO BACK UPPER ARM REMOVE AND REPLACE EVERY 14 DAYS USE WITH DEVICE, Disp: 10 each, Rfl: 3    furosemide (Lasix) 40 mg tablet, TAKE 1 TABLET BY MOUTH EVERY DAY, Disp: 90 tablet, Rfl: 3    guaiFENesin (Mucinex) 600 mg 12 hr tablet, Take 2 tablets (1,200 mg) by mouth 2 times a day. Do not crush, chew, or split., Disp: 120 tablet, Rfl: 11    ipratropium-albuteroL (Duo-Neb) 0.5-2.5 mg/3 mL nebulizer solution, TAKE 3 ML BY NEBULIZATION 3 TIMES A DAY, Disp: 180 mL, Rfl: 2    Klor-Con M10 10 mEq ER tablet, TAKE 1 TABLET BY MOUTH EVERY DAY, Disp: 90 tablet, Rfl: 3    losartan (Cozaar) 100 mg tablet, TAKE 1 TABLET BY MOUTH EVERY DAY, Disp: 90 tablet, Rfl: 3    miconazole (Micotin) 2 % cream, miconazole nitrate 2 % topical cream   APPLY SPARINGLY TO AFFECTED AREA TWICE A DAY, Disp: , Rfl:     nitroglycerin (Nitrostat) 0.4 mg SL tablet, Place 1 tablet (0.4 mg) under the tongue every 5 minutes if needed for chest pain., Disp: 90 tablet, Rfl: 11    ondansetron (Zofran) 4 mg tablet, Take 1 tablet (4 mg) by mouth 2 times a day., Disp: 60 tablet, Rfl: 0    tiZANidine (Zanaflex) 4 mg tablet, TAKE 1 TABLET (4 MG) BY MOUTH EVERY 6 HOURS IF NEEDED FOR MUSCLE SPASMS, Disp: 90 tablet, Rfl: 1    Tradjenta 5 mg tablet, TAKE 1 TABLET BY MOUTH EVERY DAY AS DIRECTED, Disp: 90 tablet, Rfl: 1    traMADol (Ultram) 50 mg tablet, Take 1 tablet (50 mg) by mouth every 6 hours if needed for severe pain (7 - 10)., Disp: , Rfl:      Imaging:  CT chest wo IV contrast    Result Date: 7/27/2024  Interpreted By:  Dallas Gold and Ebai Jerky STUDY: CT CHEST WO IV CONTRAST;  7/26/2024 8:18 am   INDICATION: Signs/Symptoms:lung nodule surveillance.   Per EMR: Patient with a history of smoking, left breast cancer status post lumpectomy and chemoradiation, acute on chronic diastolic heart failure presenting for follow-up of a lung nodule seen on CT MRI cardiac dated 03/07/2024..   COMPARISON: CT chest abdomen pelvis 08/03/2020.   ACCESSION NUMBER(S): [id number]   ORDERING CLINICIAN: PAYAL MCDONALD   TECHNIQUE: Contiguous axial images of the chest and upper abdomen were obtained without contrast. after the intravenous administration of iodinated contrast. Coronal and sagittal reformatted images were reconstructed from the axial data.   FINDINGS:     MEDIASTINUM AND LYMPH NODES: Heterogeneous enlarged appearance of the thyroid gland containing multiple hypodense thyroid nodules. The esophageal wall appears within normal limits.  No enlarged intrathoracic or axillary lymph nodes by imaging criteria. No pneumomediastinum.   VESSELS:  Normal caliber thoracic aorta . Mild aortic atherosclerosis.   HEART: Normal in size.  Mild coronary artery calcifications. No significant  pericardial effusion.   LUNG, AIRWAYS, AND PLEURA: Subpleural reticular opacities within the anterior aspect of the left upper lobe, most compatible with post radiation changes. There are patchy ground-glass opacities scattered throughout the bilateral upper lobes and right middle lobe and to a lesser extent the bilateral lower lobes. No consolidation, pulmonary edema, pleural effusion or pneumothorax.   There is a 1.0 x 0.8 cm subpleural anterior right upper lobe pulmonary nodule (series 205, image 95) consistent with a pulmonary nodule seen on the MRI cardiac scoring from 03/07/2024. Comparison to the MRI study is limited due to the difference in imaging technique, however it appears to have increased in size where it measured 0.7 x 0.5 cm.   OSSEOUS STRUCTURES: No acute osseous abnormality. Mild diffuse osseous demineralization. Visualized surgical changes within the T1 vertebral body   CHEST WALL SOFT TISSUES: Postsurgical changes of left lumpectomy. There is a 1.3 cm soft tissue nodule within the left breast.   UPPER ABDOMEN/OTHER:   Multiple partially visualized left renal cystic lesions, likely represent simple cysts. Additionally, there multiple subcentimeter left renal cyst which are hyperdense and are incompletely characterized in the basis of this examination, 1 of which is new from the CT abdomen pelvis from 08/03/2022 measuring 1.2 cm (series 202, image 284).       1. There is a 1.0 x 0.8 cm subpleural anterior right upper lobe pulmonary nodule, consistent with the nodule seen on MRI cardiac scoring dated 03/07/2024. Comparison to the MRI examination is limited due to the difference in imaging modality, however it appears to have increased in size. Further evaluation with a PET scan and/or tissue biopsy is recommended. No thoracic lymphadenopathy. 2. Scattered ground-glass opacities throughout bilateral lungs suspicious for multifocal infectious/inflammation. 3. Heterogeneous enlarged appearance of the  thyroid gland with multiple hypodense nodule, recommend correlation with thyroid ultrasound for multinodular goiter. 4. Postsurgical changes of left lumpectomy. There is a 1.3 cm nodular soft tissue density within the left breast, recommend correlation with breast mammogram. 5. Multiple partially visualized simple left renal cysts and a new incompletely characterized hypodense lesion measuring 1.2 cm. Further evaluation with an MRI renal protocol is recommended.     I personally reviewed the images/study and I agree with the findings as stated by Resident Gemini Rob. This study was interpreted at [place], [place].     MACRO: None.   Signed by: Dallas Gold 7/27/2024 4:46 PM Dictation workstation:   [id number]    Vascular US PVR without exercise    Result Date: 7/26/2024             [place] [address]  Tel [phone] and Fax [phone]  Vascular Lab Report VASC US PVR WITHOUT EXERCISE  Patient Name:      SKINNY PAT    Reading Physician:  [id number] Tahir Aceves MD, RPVI Study Date:        7/26/2024            Ordering Physician: [id number] SHUBHAM HURTADO MRN/PID:           [id number]             Technologist:       Lakesha Quiroz RVT Accession#:        [id number]         Technologist 2: Date of Birth/Age: 1950 / 74 years Encounter#:         [id number] Gender:            F Admission Status:  Outpatient           Location Performed: [facility]  Diagnosis/ICD: Peripheral vascular disease, unspecified-I73.9 CPT Codes:     66318 Peripheral artery PVR (multi segmental pressure  CONCLUSIONS: Right Lower PVR: Evidence of moderate arterial occlusive disease in the right lower extremity at rest. Decreased digital perfusion noted. Monophasic flow is noted in the right posterior tibial artery and right dorsalis pedis artery. Multiphasic flow is noted in the  right common femoral artery and right popliteal artery. Left Lower PVR: Evidence of mild arterial occlusive disease in the left lower extremity at rest. Decreased digital perfusion noted. Monophasic flow is noted in the left posterior tibial artery. Multiphasic flow is noted in the left common femoral artery, left popliteal artery and left dorsalis pedis artery.  Imaging & Doppler Findings:  RIGHT Lower PVR                Pressures Ratios Right High Thigh               176 mmHg  1.22 Right Low Thigh                102 mmHg  0.71 Right Calf                     95 mmHg   0.66 Right Posterior Tibial (Ankle) 101 mmHg  0.70 Right Dorsalis Pedis (Ankle)   95 mmHg   0.66 Right Digit (Great Toe)        31 mmHg   0.22   LEFT Lower PVR                Pressures Ratios Left High Thigh               143 mmHg  0.99 Left Low Thigh                104 mmHg  0.72 Left Calf                     101 mmHg  0.70 Left Posterior Tibial (Ankle) 109 mmHg  0.76 Left Dorsalis Pedis (Ankle)   116 mmHg  0.81 Left Digit (Great Toe)        59 mmHg   0.41                     Right Brachial Pressure 144 mmHg   [id number] Tahir Aceves MD, RPVI Electronically signed by [id number] Tahir Aceves MD, RPVI on 7/26/2024 at 10:38:28 AM  ** Final **        Labs reviewed:    Lab Results   Component Value Date    WBC 11.5 (H) 12/18/2023    HGB 14.0 12/18/2023    HCT 45.5 12/18/2023     12/18/2023    CHOL 254 (H) 12/18/2023    TRIG 143 12/18/2023    HDL 62.6 12/18/2023    ALT 13 12/18/2023    AST 13 12/18/2023     03/18/2024    K 3.7 03/18/2024     03/18/2024    CREATININE 1.11 (H) 03/18/2024    BUN 8 03/18/2024    CO2 30 03/18/2024    TSH 1.08 06/20/2023    INR 1.0 07/19/2022    HGBA1C 5.8 (H) 12/18/2023       Assessment/Plan   Problem List Items Addressed This Visit          Cardiac and Vasculature    HLD (hyperlipidemia)     Review labs when resulted         Relevant Orders    Lipid Panel    Hypertension     /58 today  Continue amlodipine,  carvedilol, losartan            Endocrine/Metabolic    Controlled diabetes mellitus (Multi)      today  Review A1c when resulted         Relevant Orders    POCT fingerstick glucose manually resulted (Completed)    Vitamin D deficiency     Review labs when result  Currently taking Vitamin D 2000 daily         Relevant Orders    Vitamin D 25-Hydroxy,Total (for eval of Vitamin D levels)    Thyroid mass     Found on CT  Thyroid US            Gastrointestinal and Abdominal    Gastroesophageal reflux disease without esophagitis    Nausea & vomiting     Discontinue Zofran  Start Phenergan 6.25mg          Gastritis without bleeding - Primary     Drug induced from Duloxetine and Alpha lipoic acid   Discontinue Alpha Lipoic Acid  Decrease Duloxetine to 1x per day for one week then discontinue  Discontinue Zofran  Start Omeprazole 20mg/day 2x per day         Constipation     Start 2x per day Senokot             Genitourinary and Reproductive    Acute kidney injury (CMS-HCC)    Relevant Orders    CBC    Comprehensive Metabolic Panel    Lipid Panel    Hypokalemia    Relevant Orders    Comprehensive Metabolic Panel    Kidney mass     Found on CT scan  Send for MRI kidney no contrast         Mass of left breast     Found on CT  Send for mammogram            Hematology and Neoplasia    Anemia       Mental Health    Anxiety     Continue Buspar   Discontinue Duloxetine            Pulmonary and Pneumonias    Cough    Lung nodules     Nodules found on CT  Send for PET scan         Atypical pneumonia     Diagnosis found on CT scan  Patient complaining of cough   Doxycycline 100mg 10 days          Other Visit Diagnoses       Hypothyroidism, unspecified type        Benign essential hypertension        Relevant Orders    CBC    Comprehensive Metabolic Panel    Lipid Panel    Elevated LDL cholesterol level        Encounter for annual wellness visit (AWV) in Medicare patient        Relevant Orders    CBC    Comprehensive Metabolic  Panel    Lipid Panel    Hemoglobin A1C    Thyroid Stimulating Hormone    Vitamin D 25-Hydroxy,Total (for eval of Vitamin D levels)            Continue current medications as listed  Follow up in 1 week for blood work follow up.

## 2024-08-06 LAB
EST. AVERAGE GLUCOSE BLD GHB EST-MCNC: 131 MG/DL
HBA1C MFR BLD: 6.2 %

## 2024-08-06 RX ORDER — PROMETHAZINE HYDROCHLORIDE 6.25 MG/5ML
12.5 SYRUP ORAL EVERY 6 HOURS PRN
Qty: 120 ML | Refills: 0 | Status: SHIPPED | OUTPATIENT
Start: 2024-08-06 | End: 2024-08-13

## 2024-08-07 ENCOUNTER — HOSPITAL ENCOUNTER (OUTPATIENT)
Dept: RADIOLOGY | Facility: CLINIC | Age: 74
Discharge: HOME | End: 2024-08-07
Payer: COMMERCIAL

## 2024-08-07 DIAGNOSIS — N63.20 MASS OF LEFT BREAST, UNSPECIFIED QUADRANT: ICD-10-CM

## 2024-08-07 DIAGNOSIS — R92.30 DENSE BREASTS, UNSPECIFIED: ICD-10-CM

## 2024-08-07 DIAGNOSIS — R92.8 OTHER ABNORMAL AND INCONCLUSIVE FINDINGS ON DIAGNOSTIC IMAGING OF BREAST: ICD-10-CM

## 2024-08-07 DIAGNOSIS — E07.9 THYROID MASS: ICD-10-CM

## 2024-08-07 PROCEDURE — 76981 USE PARENCHYMA: CPT | Mod: LT

## 2024-08-07 PROCEDURE — 76642 ULTRASOUND BREAST LIMITED: CPT | Mod: LT

## 2024-08-07 PROCEDURE — 76536 US EXAM OF HEAD AND NECK: CPT | Performed by: RADIOLOGY

## 2024-08-07 PROCEDURE — 76642 ULTRASOUND BREAST LIMITED: CPT | Performed by: STUDENT IN AN ORGANIZED HEALTH CARE EDUCATION/TRAINING PROGRAM

## 2024-08-07 PROCEDURE — G0279 TOMOSYNTHESIS, MAMMO: HCPCS | Performed by: STUDENT IN AN ORGANIZED HEALTH CARE EDUCATION/TRAINING PROGRAM

## 2024-08-07 PROCEDURE — 77066 DX MAMMO INCL CAD BI: CPT | Performed by: STUDENT IN AN ORGANIZED HEALTH CARE EDUCATION/TRAINING PROGRAM

## 2024-08-07 PROCEDURE — 77062 BREAST TOMOSYNTHESIS BI: CPT

## 2024-08-07 PROCEDURE — 76536 US EXAM OF HEAD AND NECK: CPT

## 2024-08-08 ENCOUNTER — TELEMEDICINE (OUTPATIENT)
Dept: PRIMARY CARE | Facility: CLINIC | Age: 74
End: 2024-08-08
Payer: COMMERCIAL

## 2024-08-08 DIAGNOSIS — R91.8 LUNG NODULES: Primary | ICD-10-CM

## 2024-08-08 DIAGNOSIS — Z85.3 HISTORY OF BREAST CANCER: ICD-10-CM

## 2024-08-08 DIAGNOSIS — Z87.891 FORMER SMOKER: ICD-10-CM

## 2024-08-08 RX ORDER — MELOXICAM 15 MG/1
TABLET ORAL
COMMUNITY

## 2024-08-08 ASSESSMENT — ENCOUNTER SYMPTOMS
OCCASIONAL FEELINGS OF UNSTEADINESS: 1
DEPRESSION: 0
LOSS OF SENSATION IN FEET: 0

## 2024-08-08 ASSESSMENT — LIFESTYLE VARIABLES
HOW MANY STANDARD DRINKS CONTAINING ALCOHOL DO YOU HAVE ON A TYPICAL DAY: 1 OR 2
AUDIT-C TOTAL SCORE: 1
SKIP TO QUESTIONS 9-10: 1
HOW OFTEN DO YOU HAVE A DRINK CONTAINING ALCOHOL: MONTHLY OR LESS
HOW OFTEN DO YOU HAVE SIX OR MORE DRINKS ON ONE OCCASION: NEVER

## 2024-08-08 ASSESSMENT — PAIN SCALES - GENERAL: PAINLEVEL: 0-NO PAIN

## 2024-08-08 NOTE — PROGRESS NOTES
Subjective   Patient ID: Serjio Nunes is a 74 y.o. female who presents for Lung Noduleds (Patient presents for follow up  virtual visit to discuss Lung nodules./Former smoker. Patient stated its been 7 days since stop smoking./No family or personal Hx of Lung cancer./Patient had breast cancer is on remission).  HPI 74-year-old female presents today for lung nodule clinic.    Telehealth visit between Serjio Nunes and Peyton Alcazar CNP at [facility] lung nodule clinic per patient request.    Former smoker and smoked on and off for about 10 years. One pack would last about 4-5 days when she did smoke.  Patient stated it has been 7 days since stopped smoking.  She has a history of breast cancer.  Mother had rectal cancer.    Thyroid ultrasound and bilateral mammogram diagnostic performed yesterday.  Mammogram with no mammographic or targeted sonographic evidence of malignancy.    Order for PET/CT lung SPN and MR kidney without IV contrast per Dr. Carnes in system.   Patient is aware.       7/26/2024 CT chest without IV contrast.  Subpleural reticular opacities within the  anterior aspect of the left upper lobe, most compatible with post  radiation changes. There are patchy ground-glass opacities scattered  throughout the bilateral upper lobes and right middle lobe and to a  lesser extent the bilateral lower lobes. No consolidation, pulmonary  edema, pleural effusion or pneumothorax.      There is a 1.0 x 0.8 cm subpleural anterior right upper lobe  pulmonary nodule (series 205, image 95) consistent with a pulmonary  nodule seen on the MRI cardiac scoring from 03/07/2024. Comparison to  the MRI study is limited due to the difference in imaging technique,  however it appears to have increased in size where it measured 0.7 x  0.5 cm.     There is a 1.3 cm soft tissue nodule within the left breast.     3/7/2024 MR cardiac morphology and function w and wo IV contrast  There is a density appreciated in the  right upper lung field measuring 7 mm x 5  mm in diameter poorly characterized with current study.    Review of Systems  Review of systems: Present-feeling well. Not present-chills, fatigue and fever.  Respiratory: Not present-difficulty breathing, cough, bloody sputum.  Cardiovascular: Not present-chest pain, palpitations, dyspnea on exertion.  Objective   There were no vitals taken for this visit.   id arteries normal and without bruit.  Assessment/Plan   Diagnoses and all orders for this visit:  Lung nodules  -     Referral to Thoracic Surgery; Future  Former smoker  -     Referral to Thoracic Surgery; Future  History of breast cancer  -     Referral to Thoracic Surgery; Future

## 2024-08-08 NOTE — PATIENT INSTRUCTIONS
Patient is a recent former smoker and has a history of breast cancer  CT chest dated 7/26/2024 with groundglass opacities scattered through bilateral upper lobes and right middle lobe and to a lesser degree bilateral lower lobes.  There is a 1.0 x 0.8 subpleural anterior right upper lobe pulmonary nodule.    Dr. Carnes has placed an order for PET CT Lung SPN and MRI kidney without IV contrast.  Patient did have thyroid ultrasound and diagnostic mammogram yesterday.    Patient given a referral to thoracic surgery as needed.  May need med onc follow-up pending studies.

## 2024-08-13 ENCOUNTER — APPOINTMENT (OUTPATIENT)
Dept: PRIMARY CARE | Facility: CLINIC | Age: 74
End: 2024-08-13
Payer: COMMERCIAL

## 2024-08-13 VITALS
DIASTOLIC BLOOD PRESSURE: 66 MMHG | OXYGEN SATURATION: 98 % | HEART RATE: 66 BPM | SYSTOLIC BLOOD PRESSURE: 124 MMHG | RESPIRATION RATE: 12 BRPM

## 2024-08-13 DIAGNOSIS — E07.9 THYROID MASS: ICD-10-CM

## 2024-08-13 DIAGNOSIS — E11.8 CONTROLLED DIABETES MELLITUS TYPE 2 WITH COMPLICATIONS, UNSPECIFIED WHETHER LONG TERM INSULIN USE (MULTI): ICD-10-CM

## 2024-08-13 DIAGNOSIS — N18.32 STAGE 3B CHRONIC KIDNEY DISEASE (MULTI): ICD-10-CM

## 2024-08-13 DIAGNOSIS — Z00.00 ENCOUNTER FOR ANNUAL WELLNESS VISIT (AWV) IN MEDICARE PATIENT: ICD-10-CM

## 2024-08-13 DIAGNOSIS — E55.9 VITAMIN D DEFICIENCY: ICD-10-CM

## 2024-08-13 DIAGNOSIS — I73.9 PVD (PERIPHERAL VASCULAR DISEASE) (CMS-HCC): ICD-10-CM

## 2024-08-13 DIAGNOSIS — I10 ESSENTIAL (PRIMARY) HYPERTENSION: ICD-10-CM

## 2024-08-13 DIAGNOSIS — I10 PRIMARY HYPERTENSION: ICD-10-CM

## 2024-08-13 DIAGNOSIS — K21.9 GASTROESOPHAGEAL REFLUX DISEASE WITHOUT ESOPHAGITIS: ICD-10-CM

## 2024-08-13 DIAGNOSIS — F41.9 ANXIETY: Primary | ICD-10-CM

## 2024-08-13 DIAGNOSIS — I50.33 ACUTE ON CHRONIC DIASTOLIC (CONGESTIVE) HEART FAILURE (MULTI): ICD-10-CM

## 2024-08-13 PROBLEM — N18.9 CKD (CHRONIC KIDNEY DISEASE): Status: ACTIVE | Noted: 2024-08-13

## 2024-08-13 LAB
POC APPEARANCE, URINE: CLEAR
POC BILIRUBIN, URINE: NEGATIVE
POC BLOOD, URINE: NEGATIVE
POC COLOR, URINE: ABNORMAL
POC FINGERSTICK BLOOD GLUCOSE: 198 MG/DL (ref 70–100)
POC GLUCOSE, URINE: NEGATIVE MG/DL
POC KETONES, URINE: NEGATIVE MG/DL
POC LEUKOCYTES, URINE: NEGATIVE
POC NITRITE,URINE: NEGATIVE
POC PH, URINE: 5 PH
POC PROTEIN, URINE: NEGATIVE MG/DL
POC SPECIFIC GRAVITY, URINE: 1.02
POC UROBILINOGEN, URINE: 0.2 EU/DL

## 2024-08-13 PROCEDURE — 1036F TOBACCO NON-USER: CPT | Performed by: INTERNAL MEDICINE

## 2024-08-13 PROCEDURE — 3074F SYST BP LT 130 MM HG: CPT | Performed by: INTERNAL MEDICINE

## 2024-08-13 PROCEDURE — 82962 GLUCOSE BLOOD TEST: CPT | Performed by: INTERNAL MEDICINE

## 2024-08-13 PROCEDURE — 99214 OFFICE O/P EST MOD 30 MIN: CPT | Performed by: INTERNAL MEDICINE

## 2024-08-13 PROCEDURE — 81002 URINALYSIS NONAUTO W/O SCOPE: CPT | Performed by: INTERNAL MEDICINE

## 2024-08-13 PROCEDURE — G0439 PPPS, SUBSEQ VISIT: HCPCS | Performed by: INTERNAL MEDICINE

## 2024-08-13 PROCEDURE — 93000 ELECTROCARDIOGRAM COMPLETE: CPT | Performed by: INTERNAL MEDICINE

## 2024-08-13 PROCEDURE — 3044F HG A1C LEVEL LT 7.0%: CPT | Performed by: INTERNAL MEDICINE

## 2024-08-13 PROCEDURE — 1159F MED LIST DOCD IN RCRD: CPT | Performed by: INTERNAL MEDICINE

## 2024-08-13 PROCEDURE — 4010F ACE/ARB THERAPY RXD/TAKEN: CPT | Performed by: INTERNAL MEDICINE

## 2024-08-13 PROCEDURE — 3050F LDL-C >= 130 MG/DL: CPT | Performed by: INTERNAL MEDICINE

## 2024-08-13 PROCEDURE — 3078F DIAST BP <80 MM HG: CPT | Performed by: INTERNAL MEDICINE

## 2024-08-13 RX ORDER — FUROSEMIDE 40 MG/1
20 TABLET ORAL DAILY
Qty: 30 TABLET | Refills: 3 | Status: SHIPPED | OUTPATIENT
Start: 2024-08-13

## 2024-08-13 RX ORDER — OMEPRAZOLE 20 MG/1
20 TABLET, DELAYED RELEASE ORAL DAILY
Qty: 30 TABLET | Refills: 11 | Status: SHIPPED | OUTPATIENT
Start: 2024-08-13 | End: 2025-08-13

## 2024-08-13 RX ORDER — LOSARTAN POTASSIUM 100 MG/1
50 TABLET ORAL DAILY
Qty: 90 TABLET | Refills: 0 | Status: SHIPPED | OUTPATIENT
Start: 2024-08-13

## 2024-08-13 NOTE — ASSESSMENT & PLAN NOTE
/66 today  Continue amlodipine, carvedilol  Decrease Losartan to 50mg because of declining kidney function

## 2024-08-13 NOTE — PROGRESS NOTES
ANNUAL WELLNESS VISIT    Subjective :  Chief Complaint: Serjio Nunes is an 74 y.o. female here for an annual wellness visit and general medical care and f/u.     HPI:  Patient here for annual visit. Labs, EKG, thyroid US and vascular US discussed with patient and caregiver. Today she is complaining of abdominal pain in which she was in the office last week for and was treated for pill induced gastritis. She has a history of HTN, Vitamin D deficiency, CKD, PVD.         Objective   /66   Pulse 66   Resp 12   SpO2 98%     Physical Exam  HENT:      Head: Normocephalic.      Nose: Nose normal.      Mouth/Throat:      Mouth: Mucous membranes are moist.      Pharynx: Oropharynx is clear.   Eyes:      Pupils: Pupils are equal, round, and reactive to light.   Cardiovascular:      Rate and Rhythm: Normal rate and regular rhythm.      Pulses: Normal pulses.      Heart sounds: Normal heart sounds.   Pulmonary:      Effort: Pulmonary effort is normal.      Breath sounds: Normal breath sounds.   Abdominal:      General: Bowel sounds are normal.   Musculoskeletal:         General: Normal range of motion.      Cervical back: Normal range of motion.   Skin:     General: Skin is warm and dry.      Capillary Refill: Capillary refill takes less than 2 seconds.   Neurological:      Mental Status: She is alert and oriented to person, place, and time.         Imaging:  US thyroid    Result Date: 8/8/2024  Interpreted By:  Ramses Phillips, STUDY: US THYROID;  8/7/2024 4:08 pm   INDICATION: Signs/Symptoms:nodules.   COMPARISON: No prior ultrasound available.   ACCESSION NUMBER(S): [id number]   ORDERING CLINICIAN: SHUBHAM HURTADO   TECHNIQUE: Multiple ultrasonographic images of the thyroid gland and surrounding tissues were obtained.   FINDINGS: PARENCHYMA:  Mildly heterogenous background echogenicity.   SIZE: RIGHT LOBE: 4.2 x 1.7 x 1.7 cm LEFT LOBE: 5.3 x 3 x 2.8 cm ISTHMUS: 6 mm in AP diameter   NODULES: (Please note,  assessment and description of nodules is per TI-RADS criteria. Up to 4 total nodules described, which includes largest and/or most clinically significant based on morphology.) It is noted that some spongiform and/or cystic nodules may not be specifically described and are TR category 1 (benign).   NODULE #: 1.   Location: Right lobe mid aspect Size: 1 x 0.8 x 1 cm Composition:  Solid or almost completely solid (2) Echogenicity:  Hyperechoic or isoechoic (1) Shape:  Wider-than-tall (0) Margin:  Ill-defined (0) Echogenic Foci:  None or Large comet-tail artifacts (0)   The total score of this nodule is 3 points, corresponding to a TI-RADS category  3; (3 points) Mildly suspicious.   NODULE #: 2.   Location: Right thyroid lobe inferior aspect Size: 2.2 x 1.8 x 1.1 cm Composition: Solid or almost completely solid (2) Echogenicity: Hyperechoic or isoechoic (1) Shape:  Wider-than-tall (0) Margin:  Ill-defined (0) Echogenic Foci: None or Large comet-tail artifacts (0)   The total score of this nodule is 3 points, corresponding to a TI-RADS category 3; (3 points) Mildly suspicious.   NODULE #: 3.   Location: Left thyroid lobe mid aspect Size: 3.7 x 2.7 x 2.5 cm Composition: Solid or almost completely solid (2) Echogenicity: Hyperechoic or isoechoic (1) Shape:  Wider-than-tall (0) Margin:  Ill-defined (0) Echogenic Foci: None or Large comet-tail artifacts (0)   The total score of this nodule is 3 points, corresponding to a TI-RADS category 3; (3 points) Mildly suspicious.   NODULE #: 4.   Location: Left thyroid lobe inferior aspect Size: 2.6 x 1.7 x 2.4 cm Composition: Solid or almost completely solid (2) Echogenicity: Hyperechoic or isoechoic (1) Shape:  Wider-than-tall (0) Margin:  Ill-defined (0) Echogenic Foci: None or Large comet-tail artifacts (0)   The total score of this nodule is 3 points, corresponding to a TI-RADS category 3; (3 points) Mildly suspicious.         1. Enlarged mildly heterogeneous thyroid containing  multiple varying sized TI-RADS 3 solid nodules with the largest in the left lobe measuring 3.7 cm and 2.6 cm in greatest diameter respectively.         Please note that these statements are based on the recommendations of the American College of Radiology   TI-RADS grading system. ACR TI-RADS recommendations (apply to nodules which have NOT been biopsied):   TR5 (?7 points) highly suspicious - FNA if ? 1cm, follow-up if 0.5 -0.9 cm every year for 5 years. Aggregate cancer risk 35%. TR4 (4-6 points) moderately suspicious - FNA if ? 1.5cm, follow-up if 1 -1.4 cm in 1, 2, 3 and 5 years. Aggregate cancer risk 9.1% TR3 (3 points) mildly suspicious - FNA if ? 2.5cm, follow-up if 1.5 -2.4 cm in 1, 3 and 5 years. Aggregate cancer risk 4.8% TR2 (2 points) not suspicious. Aggregate cancer risk 1.5% TR1 (0 points) benign - No FNA or follow-up. Aggregate cancer risk 0.3%   MACRO: None.   Signed by: Ramses Phillips 8/8/2024 5:59 PM Dictation workstation:   [id number]    BI mammo bilateral diagnostic tomosynthesis    Result Date: 8/7/2024  Interpreted By:  Yosi Maldonado, STUDY: BI MAMMO BILATERAL DIAGNOSTIC TOMOSYNTHESIS; BI US BREAST LIMITED LEFT;  8/7/2024 8:58 am; 8/7/2024 9:19 am   ACCESSION NUMBER(S): [id number]; [id number]   ORDERING CLINICIAN: SHUBHAM HURTADO   INDICATION: Patient presents for annual exam and evaluation of a superior left breast mass seen on recent CT of the chest. History of left lumpectomy with radiation therapy.   COMPARISON: CT from 07/26/2024, mammograms from 06/04/2021, 06/03/2020, 09/27/2018   FINDINGS: MAMMOGRAPHY: 2D and tomosynthesis images were reviewed at 1 mm slice thickness.   Density:  There are areas of scattered fibroglandular tissue.   Focal asymmetry in the superior central left breast at anterior depth which appears stable when compared to multiple prior exams and likely corresponds to the mass seen on CT. Stable postsurgical changes in the upper outer left breast at posterior depth  and left axilla. Stable left breast post radiation changes. No suspicious masses or calcifications are identified.   ULTRASOUND: Targeted ultrasound was performed of the left breast by a registered sonographer with elastography. No focal sonographic abnormalities visualized in the superior left breast. The tissue is soft on elastography. Previously-seen focal asymmetry likely represents stable fibroglandular tissue.       No mammographic or targeted sonographic evidence of malignancy. Stable left breast post treatment changes. Patient may resume annual screening.   BI-RADS CATEGORY: BI-RADS Category:  2 Benign. Recommendation:  Annual Screening. Recommended Date:  1 Year. Laterality:  Bilateral.   For any future breast imaging appointments, please call [phone] ([phone]).     MACRO: None   Signed by: Yosi Maldonado 8/7/2024 3:36 PM Dictation workstation:   [id number]    BI US breast limited left    Result Date: 8/7/2024  Interpreted By:  Yosi Maldonado, STUDY: BI MAMMO BILATERAL DIAGNOSTIC TOMOSYNTHESIS; BI US BREAST LIMITED LEFT;  8/7/2024 8:58 am; 8/7/2024 9:19 am   ACCESSION NUMBER(S): [id number]; [id number]   ORDERING CLINICIAN: SHUBHAM HURTADO   INDICATION: Patient presents for annual exam and evaluation of a superior left breast mass seen on recent CT of the chest. History of left lumpectomy with radiation therapy.   COMPARISON: CT from 07/26/2024, mammograms from 06/04/2021, 06/03/2020, 09/27/2018   FINDINGS: MAMMOGRAPHY: 2D and tomosynthesis images were reviewed at 1 mm slice thickness.   Density:  There are areas of scattered fibroglandular tissue.   Focal asymmetry in the superior central left breast at anterior depth which appears stable when compared to multiple prior exams and likely corresponds to the mass seen on CT. Stable postsurgical changes in the upper outer left breast at posterior depth and left axilla. Stable left breast post radiation changes. No suspicious masses or calcifications are  identified.   ULTRASOUND: Targeted ultrasound was performed of the left breast by a registered sonographer with elastography. No focal sonographic abnormalities visualized in the superior left breast. The tissue is soft on elastography. Previously-seen focal asymmetry likely represents stable fibroglandular tissue.       No mammographic or targeted sonographic evidence of malignancy. Stable left breast post treatment changes. Patient may resume annual screening.   BI-RADS CATEGORY: BI-RADS Category:  2 Benign. Recommendation:  Annual Screening. Recommended Date:  1 Year. Laterality:  Bilateral.   For any future breast imaging appointments, please call [phone]).     MACRO: None   Signed by: Yosi Maldonado 8/7/2024 3:36 PM Dictation workstation:   [id number]    CT chest wo IV contrast    Result Date: 7/27/2024  Interpreted By:  Dallas Gold and Ebai Jerky STUDY: CT CHEST WO IV CONTRAST;  7/26/2024 8:18 am   INDICATION: Signs/Symptoms:lung nodule surveillance.   Per EMR: Patient with a history of smoking, left breast cancer status post lumpectomy and chemoradiation, acute on chronic diastolic heart failure presenting for follow-up of a lung nodule seen on CT MRI cardiac dated 03/07/2024..   COMPARISON: CT chest abdomen pelvis 08/03/2020.   ACCESSION NUMBER(S): [id number]   ORDERING CLINICIAN: PAYAL MCDONALD   TECHNIQUE: Contiguous axial images of the chest and upper abdomen were obtained without contrast. after the intravenous administration of iodinated contrast. Coronal and sagittal reformatted images were reconstructed from the axial data.   FINDINGS:     MEDIASTINUM AND LYMPH NODES: Heterogeneous enlarged appearance of the thyroid gland containing multiple hypodense thyroid nodules. The esophageal wall appears within normal limits.  No enlarged intrathoracic or axillary lymph nodes by imaging criteria. No pneumomediastinum.   VESSELS:  Normal caliber thoracic aorta . Mild aortic atherosclerosis.    HEART: Normal in size.  Mild coronary artery calcifications. No significant pericardial effusion.   LUNG, AIRWAYS, AND PLEURA: Subpleural reticular opacities within the anterior aspect of the left upper lobe, most compatible with post radiation changes. There are patchy ground-glass opacities scattered throughout the bilateral upper lobes and right middle lobe and to a lesser extent the bilateral lower lobes. No consolidation, pulmonary edema, pleural effusion or pneumothorax.   There is a 1.0 x 0.8 cm subpleural anterior right upper lobe pulmonary nodule (series 205, image 95) consistent with a pulmonary nodule seen on the MRI cardiac scoring from 03/07/2024. Comparison to the MRI study is limited due to the difference in imaging technique, however it appears to have increased in size where it measured 0.7 x 0.5 cm.   OSSEOUS STRUCTURES: No acute osseous abnormality. Mild diffuse osseous demineralization. Visualized surgical changes within the T1 vertebral body   CHEST WALL SOFT TISSUES: Postsurgical changes of left lumpectomy. There is a 1.3 cm soft tissue nodule within the left breast.   UPPER ABDOMEN/OTHER:   Multiple partially visualized left renal cystic lesions, likely represent simple cysts. Additionally, there multiple subcentimeter left renal cyst which are hyperdense and are incompletely characterized in the basis of this examination, 1 of which is new from the CT abdomen pelvis from 08/03/2022 measuring 1.2 cm (series 202, image 284).       1. There is a 1.0 x 0.8 cm subpleural anterior right upper lobe pulmonary nodule, consistent with the nodule seen on MRI cardiac scoring dated 03/07/2024. Comparison to the MRI examination is limited due to the difference in imaging modality, however it appears to have increased in size. Further evaluation with a PET scan and/or tissue biopsy is recommended. No thoracic lymphadenopathy. 2. Scattered ground-glass opacities throughout bilateral lungs suspicious for  multifocal infectious/inflammation. 3. Heterogeneous enlarged appearance of the thyroid gland with multiple hypodense nodule, recommend correlation with thyroid ultrasound for multinodular goiter. 4. Postsurgical changes of left lumpectomy. There is a 1.3 cm nodular soft tissue density within the left breast, recommend correlation with breast mammogram. 5. Multiple partially visualized simple left renal cysts and a new incompletely characterized hypodense lesion measuring 1.2 cm. Further evaluation with an MRI renal protocol is recommended.     I personally reviewed the images/study and I agree with the findings as stated by Resident Gemini Rob. This study was interpreted at [place], [place].     MACRO: None.   Signed by: Dallas Gold 7/27/2024 4:46 PM Dictation workstation:   [id number]    Vascular US PVR without exercise    Result Date: 7/26/2024             [place] [address]  Tel [phone] and Fax [phone]  Vascular Lab Report VASC US PVR WITHOUT EXERCISE  Patient Name:      SKINNY PAT    Reading Physician:  [id number] Tahir Aceves MD, RPVI Study Date:        7/26/2024            Ordering Physician: [id number] HURTADO MRN/PID:           [id number]             Technologist:       Lakesha Quiroz RVVERONICA Accession#:        [id number]         Technologist 2: Date of Birth/Age: 1950 / 74 years Encounter#:         [id number] Gender:            F Admission Status:  Outpatient           Location Performed: [facility]  Diagnosis/ICD: Peripheral vascular disease, unspecified-I73.9 CPT Codes:     12275 Peripheral artery PVR (multi segmental pressure  CONCLUSIONS: Right Lower PVR: Evidence of moderate arterial occlusive disease in the right lower extremity at rest. Decreased digital perfusion noted. Monophasic flow is noted in the right posterior  tibial artery and right dorsalis pedis artery. Multiphasic flow is noted in the right common femoral artery and right popliteal artery. Left Lower PVR: Evidence of mild arterial occlusive disease in the left lower extremity at rest. Decreased digital perfusion noted. Monophasic flow is noted in the left posterior tibial artery. Multiphasic flow is noted in the left common femoral artery, left popliteal artery and left dorsalis pedis artery.  Imaging & Doppler Findings:  RIGHT Lower PVR                Pressures Ratios Right High Thigh               176 mmHg  1.22 Right Low Thigh                102 mmHg  0.71 Right Calf                     95 mmHg   0.66 Right Posterior Tibial (Ankle) 101 mmHg  0.70 Right Dorsalis Pedis (Ankle)   95 mmHg   0.66 Right Digit (Great Toe)        31 mmHg   0.22   LEFT Lower PVR                Pressures Ratios Left High Thigh               143 mmHg  0.99 Left Low Thigh                104 mmHg  0.72 Left Calf                     101 mmHg  0.70 Left Posterior Tibial (Ankle) 109 mmHg  0.76 Left Dorsalis Pedis (Ankle)   116 mmHg  0.81 Left Digit (Great Toe)        59 mmHg   0.41                     Right Brachial Pressure 144 mmHg   [id number] Tahir Aceves MD, RPVI Electronically signed by [id number] Tahir Aceves MD, RPVI on 7/26/2024 at 10:38:28 AM  ** Final **        Labs reviewed:    Lab Results   Component Value Date    WBC 15.9 (H) 08/05/2024    HGB 13.5 08/05/2024    HCT 43.1 08/05/2024     (L) 08/05/2024    CHOL 234 (H) 08/05/2024    TRIG 249 (H) 08/05/2024    HDL 34.6 08/05/2024    ALT 21 08/05/2024    AST 16 08/05/2024     08/05/2024    K 3.9 08/05/2024     08/05/2024    CREATININE 1.33 (H) 08/05/2024    BUN 18 08/05/2024    CO2 24 08/05/2024    TSH 2.64 08/05/2024    INR 1.0 07/19/2022    HGBA1C 6.2 (H) 08/05/2024       Past Medical, Surgical, and Family History reviewed and updated in chart.    I have reviewed and reconciled the medication list with the patient today.    Current Outpatient Medications:     albuterol 90 mcg/actuation inhaler, Inhale 1 puff 3 times a day., Disp: 18 g, Rfl: 2    amLODIPine (Norvasc) 10 mg tablet, TAKE 1 TABLET BY MOUTH EVERY DAY, Disp: 90 tablet, Rfl: 3    aspirin 81 mg EC tablet, Take 1 tablet (81 mg) by mouth once daily., Disp: , Rfl:     busPIRone (Buspar) 5 mg tablet, TAKE 1 TABLET (5 MG) BY MOUTH 2 TIMES A DAY AS NEEDED (WHEN FEELING ANXIOUS)., Disp: 180 tablet, Rfl: 3    carvedilol (Coreg) 25 mg tablet, TAKE 1 TABLET BY MOUTH TWICE A DAY, Disp: 180 tablet, Rfl: 3    cholecalciferol (Vitamin D-3) 50 MCG (2000 UT) tablet, Take 1 tablet (2,000 Units) by mouth once daily., Disp: , Rfl:     clotrimazole-betamethasone (Lotrisone) cream, APPLY THIN COAT TO AFFECTED AREA TWICE A DAY IN THE MORNING AND IN THE EVENING, Disp: 45 g, Rfl: 3    colchicine, gout, 0.6 mg tablet, TAKE 1 TABLET BY MOUTH EVERY DAY, Disp: 90 tablet, Rfl: 1    doxycycline (Vibramycin) 100 mg capsule, Take 1 capsule (100 mg) by mouth 2 times a day for 10 days. Take with at least 8 ounces (large glass) of water, do not lie down for 30 minutes after, Disp: 20 capsule, Rfl: 0    fluocinonide 0.05 % cream, Apply topically 2 times a day. APPLY SPARINGLY TO AFFECTED AREA, Disp: , Rfl:     fluticasone (Flonase) 50 mcg/actuation nasal spray, Administer 1 spray into each nostril once daily., Disp: , Rfl:     fluticasone propion-salmeteroL (Advair Diskus) 250-50 mcg/dose diskus inhaler, Inhale 1 puff twice a day., Disp: , Rfl:     FreeStyle Jaycob 14 Day Sensor kit, APPLY SENSOR TO BACK UPPER ARM REMOVE AND REPLACE EVERY 14 DAYS USE WITH DEVICE, Disp: 10 each, Rfl: 3    furosemide (Lasix) 40 mg tablet, TAKE 1 TABLET BY MOUTH EVERY DAY, Disp: 90 tablet, Rfl: 3    guaiFENesin (Mucinex) 600 mg 12 hr tablet, Take 2 tablets (1,200 mg) by mouth 2 times a day. Do not crush, chew, or split., Disp: 120 tablet, Rfl: 11    ipratropium-albuteroL (Duo-Neb) 0.5-2.5 mg/3 mL nebulizer solution, TAKE 3 ML BY  NEBULIZATION 3 TIMES A DAY, Disp: 180 mL, Rfl: 2    Klor-Con M10 10 mEq ER tablet, TAKE 1 TABLET BY MOUTH EVERY DAY, Disp: 90 tablet, Rfl: 3    losartan (Cozaar) 100 mg tablet, TAKE 1 TABLET BY MOUTH EVERY DAY, Disp: 90 tablet, Rfl: 3    miconazole (Micotin) 2 % cream, miconazole nitrate 2 % topical cream  APPLY SPARINGLY TO AFFECTED AREA TWICE A DAY, Disp: , Rfl:     nitroglycerin (Nitrostat) 0.4 mg SL tablet, Place 1 tablet (0.4 mg) under the tongue every 5 minutes if needed for chest pain., Disp: 90 tablet, Rfl: 11    omeprazole OTC (PriLOSEC OTC) 20 mg EC tablet, Take 1 tablet (20 mg) by mouth once daily. Do not crush, chew, or split., Disp: , Rfl:     promethazine (Phenergan) 6.25 mg/5 mL syrup, TAKE 10 ML (12.5 MG) BY MOUTH EVERY 6 HOURS IF NEEDED FOR NAUSEA OR VOMITING FOR UP TO 7 DAYS., Disp: 120 mL, Rfl: 0    tiZANidine (Zanaflex) 4 mg tablet, TAKE 1 TABLET (4 MG) BY MOUTH EVERY 6 HOURS IF NEEDED FOR MUSCLE SPASMS, Disp: 90 tablet, Rfl: 1    Tradjenta 5 mg tablet, TAKE 1 TABLET BY MOUTH EVERY DAY AS DIRECTED, Disp: 90 tablet, Rfl: 1     List of current healthcare providers:  Patient Care Team:  Raymond Carnes MD as PCP - General  Raymond Carnes MD as PCP - [facility] PCP     HRA:       Steadi Fall Risk  One or more falls in the last year?    How many Times?    Was the patient injured in the fall?    Has trouble stepping onto curb?    Advised to use a cane or walker to get around safely?    Often has to rush to toilet?    Feels unsteady when walking?    Has lost some feeling in feet?    Often feels sad or depressed?    Steadies self on furniture while walking at home?    Takes medicine that makes them feel lightheaded or more tired than usual?    Worried about Falling?    Takes medicine to sleep or improve mood?    Needs to push with hands when rising from a chair?                                            Assessment/Plan :  Problem List Items Addressed This Visit          Cardiac and Vasculature     Hypertension     /66 today  Continue amlodipine, carvedilol  Decrease Losartan to 50mg because of declining kidney function         Relevant Orders    ECG 12 lead (Clinic Performed)    PVD (peripheral vascular disease) (CMS-HCC)     Moderate in R leg  Mild in L leg  Refer to vascular medicine             Endocrine/Metabolic    Controlled diabetes mellitus (Multi)      today  A1c 6.2  Patient to continue current medications         Relevant Orders    POCT fingerstick glucose manually resulted (Completed)    Vitamin D deficiency     Vitamin D level WNL  Continue taking Vitamin D 2000 daily         Thyroid mass     Thyroid US found nodules, refer for fine needle biopsy and endocrine.             Gastrointestinal and Abdominal    Gastroesophageal reflux disease without esophagitis     Refill omeprazole            Genitourinary and Reproductive    CKD (chronic kidney disease)     Creatinine 1.33  GFR 43  Decrease furosemide to 20mg  Decrease Losartan to 50mg  Discontinue Meloxicam   Patient scheduled for renal US on Monday  Re evaluate kidney function in 1 month            Mental Health    Anxiety - Primary     Buspar as needed          Other Visit Diagnoses       Encounter for annual wellness visit (AWV) in Medicare patient        Relevant Orders    POCT UA (nonautomated) manually resulted (Completed)    ECG 12 lead (Clinic Performed)    POCT fingerstick glucose manually resulted (Completed)    Acute on chronic diastolic (congestive) heart failure (Multi)        Essential (primary) hypertension              The following health maintenance schedule was reviewed with the patient and provided in printed form in the after visit summary:  Health Maintenance   Topic Date Due    Diabetes: Foot Exam  Never done    Diabetes: Retinopathy Screening  Never done    Hepatitis A Vaccines (1 of 2 - Risk 2-dose series) Never done    DTaP/Tdap/Td Vaccines (1 - Tdap) Never done    Zoster Vaccines (1 of 2) Never done    RSV  Pregnant patients and/or  patients aged 60+ years (1 - 1-dose 60+ series) Never done    COVID-19 Vaccine (3 - 2023-24 season) 09/01/2023    Echocardiogram  04/14/2024    Influenza Vaccine (1) 09/01/2024    Diabetes: Hemoglobin A1C  11/05/2024    Creatinine Level  08/05/2025    Potassium Level  08/05/2025    TSH Level  08/05/2025    Lipid Panel  08/05/2025    Mammogram  08/07/2025    Medicare Annual Wellness Visit (AWV)  08/14/2025    Colorectal Cancer Screening  05/02/2029    Pneumococcal Vaccine: 65+ Years  Completed    Hepatitis C Screening  Completed    Bone Density Scan  Completed    HIB Vaccines  Aged Out    Hepatitis B Vaccines  Aged Out    IPV Vaccines  Aged Out    Meningococcal Vaccine  Aged Out    Rotavirus Vaccines  Aged Out    HPV Vaccines  Aged Out    Irritable Bowel Syndrome  Discontinued       Advance Care Planning   na             Orders Placed This Encounter   Procedures    POCT UA (nonautomated) manually resulted     Order Specific Question:   Release result to MyChart     Answer:   Immediate [1]    POCT fingerstick glucose manually resulted     Order Specific Question:   Release result to MyChart     Answer:   Immediate [1]    ECG 12 lead (Clinic Performed)     Order Specific Question:   Reason for Exam:     Answer:   .  Hypertension, annual physical       Continue current medications as listed  Follow up in 1 month.

## 2024-08-13 NOTE — ASSESSMENT & PLAN NOTE
Creatinine 1.33  GFR 43  Decrease furosemide to 20mg  Decrease Losartan to 50mg  Discontinue Meloxicam   Patient scheduled for renal US on Monday  Re evaluate kidney function in 1 month

## 2024-08-19 ENCOUNTER — HOSPITAL ENCOUNTER (OUTPATIENT)
Dept: RADIOLOGY | Facility: HOSPITAL | Age: 74
Discharge: HOME | End: 2024-08-19
Payer: COMMERCIAL

## 2024-08-19 DIAGNOSIS — N28.89 KIDNEY MASS: ICD-10-CM

## 2024-08-19 PROCEDURE — 74181 MRI ABDOMEN W/O CONTRAST: CPT | Performed by: RADIOLOGY

## 2024-08-19 PROCEDURE — 74181 MRI ABDOMEN W/O CONTRAST: CPT

## 2024-08-19 RX ORDER — OMEPRAZOLE 20 MG/1
CAPSULE, DELAYED RELEASE ORAL
COMMUNITY
Start: 2024-08-13

## 2024-08-20 ENCOUNTER — HOSPITAL ENCOUNTER (OUTPATIENT)
Dept: RADIOLOGY | Facility: CLINIC | Age: 74
Discharge: HOME | End: 2024-08-20
Payer: COMMERCIAL

## 2024-08-20 ENCOUNTER — OFFICE VISIT (OUTPATIENT)
Dept: SURGERY | Facility: CLINIC | Age: 74
End: 2024-08-20
Payer: COMMERCIAL

## 2024-08-20 VITALS
WEIGHT: 167 LBS | BODY MASS INDEX: 29.59 KG/M2 | DIASTOLIC BLOOD PRESSURE: 72 MMHG | HEIGHT: 63 IN | TEMPERATURE: 96.8 F | OXYGEN SATURATION: 98 % | HEART RATE: 82 BPM | SYSTOLIC BLOOD PRESSURE: 113 MMHG

## 2024-08-20 DIAGNOSIS — R79.1 ABNORMAL COAGULATION PROFILE: ICD-10-CM

## 2024-08-20 DIAGNOSIS — E08.21 DIABETES MELLITUS DUE TO UNDERLYING CONDITION, CONTROLLED, WITH DIABETIC NEPHROPATHY, WITHOUT LONG-TERM CURRENT USE OF INSULIN (MULTI): ICD-10-CM

## 2024-08-20 DIAGNOSIS — R91.8 LUNG NODULES: Primary | ICD-10-CM

## 2024-08-20 DIAGNOSIS — Z87.891 FORMER SMOKER: ICD-10-CM

## 2024-08-20 DIAGNOSIS — R91.8 LUNG NODULES: ICD-10-CM

## 2024-08-20 DIAGNOSIS — Z85.3 HISTORY OF BREAST CANCER: ICD-10-CM

## 2024-08-20 DIAGNOSIS — R50.9 FEVER, UNSPECIFIED: ICD-10-CM

## 2024-08-20 DIAGNOSIS — J43.8 OTHER EMPHYSEMA (MULTI): ICD-10-CM

## 2024-08-20 PROCEDURE — 4010F ACE/ARB THERAPY RXD/TAKEN: CPT | Performed by: STUDENT IN AN ORGANIZED HEALTH CARE EDUCATION/TRAINING PROGRAM

## 2024-08-20 PROCEDURE — 3078F DIAST BP <80 MM HG: CPT | Performed by: STUDENT IN AN ORGANIZED HEALTH CARE EDUCATION/TRAINING PROGRAM

## 2024-08-20 PROCEDURE — 3050F LDL-C >= 130 MG/DL: CPT | Performed by: STUDENT IN AN ORGANIZED HEALTH CARE EDUCATION/TRAINING PROGRAM

## 2024-08-20 PROCEDURE — 99215 OFFICE O/P EST HI 40 MIN: CPT | Mod: 57,25 | Performed by: STUDENT IN AN ORGANIZED HEALTH CARE EDUCATION/TRAINING PROGRAM

## 2024-08-20 PROCEDURE — 1125F AMNT PAIN NOTED PAIN PRSNT: CPT | Performed by: STUDENT IN AN ORGANIZED HEALTH CARE EDUCATION/TRAINING PROGRAM

## 2024-08-20 PROCEDURE — 3430000001 HC RX 343 DIAGNOSTIC RADIOPHARMACEUTICALS: Performed by: INTERNAL MEDICINE

## 2024-08-20 PROCEDURE — 99215 OFFICE O/P EST HI 40 MIN: CPT | Performed by: STUDENT IN AN ORGANIZED HEALTH CARE EDUCATION/TRAINING PROGRAM

## 2024-08-20 PROCEDURE — 3008F BODY MASS INDEX DOCD: CPT | Performed by: STUDENT IN AN ORGANIZED HEALTH CARE EDUCATION/TRAINING PROGRAM

## 2024-08-20 PROCEDURE — 3074F SYST BP LT 130 MM HG: CPT | Performed by: STUDENT IN AN ORGANIZED HEALTH CARE EDUCATION/TRAINING PROGRAM

## 2024-08-20 PROCEDURE — 3044F HG A1C LEVEL LT 7.0%: CPT | Performed by: STUDENT IN AN ORGANIZED HEALTH CARE EDUCATION/TRAINING PROGRAM

## 2024-08-20 PROCEDURE — 78815 PET IMAGE W/CT SKULL-THIGH: CPT | Mod: PET TUMOR INIT TX STRAT | Performed by: RADIOLOGY

## 2024-08-20 PROCEDURE — 1159F MED LIST DOCD IN RCRD: CPT | Performed by: STUDENT IN AN ORGANIZED HEALTH CARE EDUCATION/TRAINING PROGRAM

## 2024-08-20 PROCEDURE — 78815 PET IMAGE W/CT SKULL-THIGH: CPT | Mod: PI

## 2024-08-20 PROCEDURE — A9552 F18 FDG: HCPCS | Performed by: INTERNAL MEDICINE

## 2024-08-20 RX ORDER — HEPARIN SODIUM 5000 [USP'U]/ML
5000 INJECTION, SOLUTION INTRAVENOUS; SUBCUTANEOUS ONCE
OUTPATIENT
Start: 2024-08-20 | End: 2024-08-20

## 2024-08-20 RX ORDER — FLUDEOXYGLUCOSE F 18 200 MCI/ML
12.2 INJECTION, SOLUTION INTRAVENOUS
Status: COMPLETED | OUTPATIENT
Start: 2024-08-20 | End: 2024-08-20

## 2024-08-20 RX ORDER — SODIUM CHLORIDE, SODIUM LACTATE, POTASSIUM CHLORIDE, CALCIUM CHLORIDE 600; 310; 30; 20 MG/100ML; MG/100ML; MG/100ML; MG/100ML
20 INJECTION, SOLUTION INTRAVENOUS CONTINUOUS
OUTPATIENT
Start: 2024-08-20

## 2024-08-20 RX ORDER — CEFAZOLIN SODIUM 2 G/100ML
2 INJECTION, SOLUTION INTRAVENOUS ONCE
OUTPATIENT
Start: 2024-08-20 | End: 2024-08-20

## 2024-08-20 ASSESSMENT — PAIN SCALES - GENERAL: PAINLEVEL: 6

## 2024-08-20 NOTE — PATIENT INSTRUCTIONS
You are  scheduled for surgery on 9/12 or 9/16 at [facility]. You will need to get Preadmission testing. We will call you to confirm the date once we get confirmation.      Dr Bansal would also like you to get new Pulmonary Function Tests (PFT's).  You will get a call to schedule your PFT's at a location near you.      RESPIRATORY THERAPY PFT PREP     1.  Wear loose clothing so that your ability to take a deep breath is not restricted.  2.  You may eat a light meal before testing.  3.  No exercise 30 minutes prior to testing.   4.  Do not smoke for 8 hours prior to testing.  5.  No alcohol 4 hours prior.  6.  No caffeine (Coffee, Tea or Soda) 8 hours prior.  7.  Stop short acting inhaled breathing medicines such as Albuterol, Proventil, ventolin, Proair for 8 hours prior.  8.  Stop the following inhaled breathing medicines such as Atrovent and Combivent for 24 hours prior.  9.  Stop the following inhaled breathing medicines such as Serevent, Advair for 48 hours prior.  10. Stop the following inhaled breathing medicines: Spiriva and Anoro for 1 week prior to testing.   11.  Please call to reschedule if you have a bad cold or Upper Respiratory Infection or are running a fever the day of testing.    Approximate time 1 hour.     GENERAL PRESURGICAL INSTRUCTIONS    **Please call the office with any questions.  [phone]    PREADMISSION TESTING:    *If your surgeon wants you to get PAT you will get a call to schedule an appointment.  This is usually 1-2 weeks prior at the hospital you are having your surgery.  You will get bloodwork done and they will go over your medical history and optimize you for general anesthesia.  They will give you more detailed instructions at that time.        MEDICATIONS:  *Take  your regularly scheduled medications the morning of surgery with a sip of water unless instructed by your surgeon.  If you have any questions ask your surgeon.   Exceptions include:  **Blood thinners, check with your  surgeon about holding for surgery. (Aspirin 81 mg is ok to take)   *Ibuprofen, multivitamins, fish oil, supplements- STOP 7 days before surgery.  *Lisinopril/ Losartan - HOLD morning of surgery  *Diabetes medications (ie: metformin, glipizide) - HOLD morning of surgery  *Monjaro - HOLD 1 week prior to surgery    CONTACT SURGEON'S OFFICE IF YOU DEVELOP:  * Fever = 100.4 F   * New respiratory symptoms (e.g. cough, shortness of breath, respiratory distress, sore throat)  * Recent loss of taste or smell  *Flu like symptoms such as headache, fatigue or gastrointestinal symptoms  * You develop any open sores, shingles, burning or painful urination   AND/OR:  * You no longer wish to have the surgery.  * Any other personal circumstances change that may lead to the need to cancel or defer this surgery.  *You were admitted to any hospital within one week of your planned procedure.  *There have been any changes to your insurance, address, or phone number.     SMOKING:  *Stop smoking, using street drugs, or consuming excessive alcohol.   *Quitting smoking can make a huge difference to your health and recovery from surgery.    *If you need help with quitting, call 5-050-QUIT-NOW.       SURGICAL TIME/PARKING AND ARRIVAL:      [place]: You will be contacted between 2 p.m. and 6 p.m. the business day before your surgery with your arrival time. *If you haven't received a call by 6pm, call [phone].  Check in at the Main Entrance desk and let them know you are here for surgery.        *You will be directed to the 2nd floor surgical waiting area.    *Scheduled surgery times may change and you will be notified if this occurs-check your personal voicemail for any updates.    THE DAY BEFORE SURGERY:  *Do not eat any food after midnight the night before surgery.       *Shower with any antibacterial soap the night before and/or the morning of your surgery.  *If you were given Hibiclens soap follow the instructions below.    Hibiclens  instructions:  Wash your hair with regular shampoo and rinse well.  Rinse your body with water.  Shower from the neck down with HIBICLENS soap.  Do NOT use Hibiclens on face, head/hair, or genitals.  For sensitive skin, test on your inner wrist first.  Discontinue use if you get a rash or are allergic to Chlorhexidine.  Rinse well.  Dry with a clean towel.    **After showering, do NOT apply hair products, lotions, powders, creams, makeup, nail polish, Vaseline, or deodorant.**       ON THE MORNING OF SURGERY:  *Do not eat or drink anything.  This includes gum or candy.  *You may take your morning meds with a small sips of water.   DIABETICS:  Please check fasting blood sugar  upon waking up.  If fasting sugar is <80 mg/dl, please drink 100ml/3oz of apple juice no later than 2 hours prior to surgery.  *Brush your teeth before coming to the hospital.   *Do not use moisturizers, creams, lotions or perfume.  *Wear comfortable, loose fitting clothing.   *All jewelry and valuables should be left at home.  *Prosthetic devices such as contact lenses, hearing aids, dentures, eyelash extensions, hairpins and body piercing must be removed before surgery.     BRING WITH YOU:  *Photo ID and insurance card  *Current list of medicines and allergies Include dose and how often you take it.   *Pacemaker/Defibrillator/Heart stent cards  *CPAP machine and mask  *Slings/splints/crutches/walker  *Copy of your complete Advanced Directive/DHPOA-if applicable  *Neurostimulator implant remote       AFTER INPATIENT SURGERY:  *A responsible adult MUST accompany you at the time of discharge and stay with you for 24 hours after your surgery.  *You may NOT drive yourself home after surgery.  *You may use a taxi or ride sharing service ([employer], Uber) to return home ONLY if you are accompanied by a friend or family member.  *Instructions for resuming your medications will be provided by your surgeon.    FOLLOW UP:  *You will be scheduled for a post  op follow up visit Dr. Bansal about 2 weeks after you leave the hospital.    *You may be asked to get a Chest XRAY prior to seeing her.  You can get this done the same day as your appointment 30 min prior in Radiology ([facility] 1st floor).    *If there is pathology this is usually resulted by this appointment for Dr. Bansal to go over with you.      **For More Information about your condition or surgery visit [email or web address]

## 2024-08-20 NOTE — H&P (VIEW-ONLY)
Chief complaint:  RUL lung nodule    History Of Present Illness  Serjio Nunes is a 74 y.o. female, current smoker (quit >10 years but recent stressors has started again; 20 pack year hx max), presenting with a right upper lobe lung nodule. Patient was referred by Peyton Alcazar.  Additional h/o left breast cancer status post lumpectomy and chemoradiation, acute on chronic diastolic heart failure presenting for follow-up of a lung nodule seen on CT MRI cardiac dated 03/07/2024.     CT scan obtained 7/26 showed a 1cm right upper lobe nodule slightly increased in size compared to MRI. Patient had a recent pneumonia causing SOB and cough but these sxs have overall improved since she finished course of abx >1 week ago. Has had stomach upset and indigestion. Along with this has had some issues with weight loss (approx 25+ lbs/the last few months).     No history of MI or CVA. Could walk a mile or climb 2 flights of stairs: yes     Past Medical History  She has a past medical history of Breast cancer (Multi), Cervicalgia (09/28/2016), Encounter for general adult medical examination without abnormal findings (10/31/2019), Encounter for general adult medical examination without abnormal findings (04/13/2021), antineoplastic chemo, Lung nodule, Other conditions influencing health status (11/05/2019), Pain in unspecified limb (09/28/2016), Personal history of irradiation, Personal history of other diseases of the musculoskeletal system and connective tissue (09/28/2016), Personal history of other diseases of the respiratory system (11/05/2019), Personal history of other diseases of the respiratory system (11/05/2019), Personal history of other drug therapy (09/29/2017), Personal history of other specified conditions (11/05/2019), Rash and other nonspecific skin eruption (11/05/2019), and Unspecified abdominal hernia without obstruction or gangrene.    Surgical History  She has a past surgical history that includes Shoulder  surgery (10/15/2018); Hernia repair (10/15/2018); Foot surgery (09/29/2016); Back surgery (09/29/2016); Neck surgery (09/29/2016); Breast lumpectomy; and Breast biopsy.     Social History  She reports that she quit smoking about 11 years ago. Her smoking use included cigarettes. She has never used smokeless tobacco. She reports that she does not currently use alcohol. She reports current drug use. Frequency: 7.00 times per week. Drug: Marijuana.    Family History  Family history of cancer: Yes  Family History   Problem Relation Name Age of Onset    Cancer Mother          COLORECTAL    Arthritis Father      Hypertension Father          Medications    Current Outpatient Medications:     albuterol 90 mcg/actuation inhaler, Inhale 1 puff 3 times a day., Disp: 18 g, Rfl: 2    amLODIPine (Norvasc) 10 mg tablet, TAKE 1 TABLET BY MOUTH EVERY DAY, Disp: 90 tablet, Rfl: 3    aspirin 81 mg EC tablet, Take 1 tablet (81 mg) by mouth once daily., Disp: , Rfl:     busPIRone (Buspar) 5 mg tablet, TAKE 1 TABLET (5 MG) BY MOUTH 2 TIMES A DAY AS NEEDED (WHEN FEELING ANXIOUS)., Disp: 180 tablet, Rfl: 3    carvedilol (Coreg) 25 mg tablet, TAKE 1 TABLET BY MOUTH TWICE A DAY, Disp: 180 tablet, Rfl: 3    cholecalciferol (Vitamin D-3) 50 MCG (2000 UT) tablet, Take 1 tablet (2,000 Units) by mouth once daily., Disp: , Rfl:     colchicine, gout, 0.6 mg tablet, TAKE 1 TABLET BY MOUTH EVERY DAY, Disp: 90 tablet, Rfl: 1    fluticasone (Flonase) 50 mcg/actuation nasal spray, Administer 1 spray into each nostril once daily., Disp: , Rfl:     fluticasone propion-salmeteroL (Advair Diskus) 250-50 mcg/dose diskus inhaler, Inhale 1 puff twice a day., Disp: , Rfl:     furosemide (Lasix) 40 mg tablet, Take 0.5 tablets (20 mg) by mouth once daily., Disp: 30 tablet, Rfl: 3    ipratropium-albuteroL (Duo-Neb) 0.5-2.5 mg/3 mL nebulizer solution, TAKE 3 ML BY NEBULIZATION 3 TIMES A DAY, Disp: 180 mL, Rfl: 2    Klor-Con M10 10 mEq ER tablet, TAKE 1 TABLET BY  MOUTH EVERY DAY, Disp: 90 tablet, Rfl: 3    losartan (Cozaar) 100 mg tablet, Take 0.5 tablets (50 mg) by mouth once daily., Disp: 90 tablet, Rfl: 0    nitroglycerin (Nitrostat) 0.4 mg SL tablet, Place 1 tablet (0.4 mg) under the tongue every 5 minutes if needed for chest pain., Disp: 90 tablet, Rfl: 11    omeprazole OTC (PriLOSEC OTC) 20 mg EC tablet, Take 1 tablet (20 mg) by mouth once daily. Do not crush, chew, or split., Disp: 30 tablet, Rfl: 11    promethazine (Phenergan) 6.25 mg/5 mL syrup, TAKE 10 ML (12.5 MG) BY MOUTH EVERY 6 HOURS IF NEEDED FOR NAUSEA OR VOMITING FOR UP TO 7 DAYS., Disp: 120 mL, Rfl: 0    tiZANidine (Zanaflex) 4 mg tablet, TAKE 1 TABLET (4 MG) BY MOUTH EVERY 6 HOURS IF NEEDED FOR MUSCLE SPASMS, Disp: 90 tablet, Rfl: 1    Tradjenta 5 mg tablet, TAKE 1 TABLET BY MOUTH EVERY DAY AS DIRECTED, Disp: 90 tablet, Rfl: 1    clotrimazole-betamethasone (Lotrisone) cream, APPLY THIN COAT TO AFFECTED AREA TWICE A DAY IN THE MORNING AND IN THE EVENING, Disp: 45 g, Rfl: 3    fluocinonide 0.05 % cream, Apply topically 2 times a day. APPLY SPARINGLY TO AFFECTED AREA, Disp: , Rfl:     FreeStyle Jaycob 14 Day Sensor kit, APPLY SENSOR TO BACK UPPER ARM REMOVE AND REPLACE EVERY 14 DAYS USE WITH DEVICE, Disp: 10 each, Rfl: 3    guaiFENesin (Mucinex) 600 mg 12 hr tablet, Take 2 tablets (1,200 mg) by mouth 2 times a day. Do not crush, chew, or split., Disp: 120 tablet, Rfl: 11    miconazole (Micotin) 2 % cream, miconazole nitrate 2 % topical cream  APPLY SPARINGLY TO AFFECTED AREA TWICE A DAY, Disp: , Rfl:     omeprazole (PriLOSEC) 20 mg DR capsule, , Disp: , Rfl:   No current facility-administered medications for this visit.    Allergies  Iodinated contrast media, Strawberry, Iodine, Other, and Latex    Review of Systems:  Review of Systems   Constitutional: No fevers, chills, unexpected weight change  HENT: No sore throat, congestion, or nasal drainage  Eyes: No visual changes or eye itching  Respiratory: see HPI.  "No cough, worsening dyspnea, wheezing  Cardiac: No chest pain, palpitations, or lower extremity edema  Gastrointestinal: No nausea, vomiting, diarrhea. No abdominal pain  Genitourinary: No dysuria or hematuria  Musculoskeletal: No back pain. No significant myalgias or arthralgias  Neurologic: No headaches, dizziness, or seizures.  Hematologic: No east bleeding or bruising.  Psychiatric: No anxiety or depression.    Physical Exam:  Physical Exam  /72   Pulse 82   Temp 36 °C (96.8 °F)   Ht 1.6 m (5' 3\")   Wt 75.8 kg (167 lb)   SpO2 98%   BMI 29.58 kg/m²   Constitutional:       General: Patient is not in acute distress.     Appearance: Normal appearance; not ill-appearing.   HENT:      Head: Normocephalic.      Nose: No congestion or rhinorrhea.   Cardiovascular:      Rate and Rhythm: Normal rate and regular rhythm.      Pulses: Normal pulses.   Pulmonary:      Effort: Pulmonary effort is normal. No respiratory distress.  No conversational dyspnea     Breath sounds: No stridor. No wheezing.   Abdominal:      General: There is no distension.      Palpations: Abdomen is soft.      Tenderness: There is no abdominal tenderness.   Musculoskeletal:         General: No swelling, tenderness or deformity. Normal range of motion.      Cervical back: Normal range of motion. No rigidity.   Lymphadenopathy:      Cervical: No cervical adenopathy.   Skin:     General: Skin is warm and dry.   Neurological:      General: No focal deficit present.      Mental Status: Patient is alert and oriented to person, place, and time.   Psychiatric:         Mood and Affect: Mood normal.     Relevant Results    Pathology:  N/a     Imaging:    NM PET CT lung SPN    Result Date: 8/20/2024  Interpreted By:  Madi Bedoya and Maltbie Grace STUDY: NM PET CT LUNG SPN;  8/20/2024 10:01 am   INDICATION: Signs/Symptoms:lung nodules.   COMPARISON: MRI kidney 08/19/2024 Pelvis 08/03/2020 CT chest 07/26/2024   ACCESSION NUMBER(S): [id number]   " ORDERING CLINICIAN: SHUBHAM HURTADO   TECHNIQUE: TECHNIQUE DIVISION OF NUCLEAR MEDICINE POSITRON EMISSION TOMOGRAPHY (PET-CT)   The patient received an intravenous dose of 12.2 mCi of Fluorine-18 fluorodeoxyglucose (FDG). Positron emission tomographic (PET) images from skull base to the mid-thighs were then acquired after a one hour delay. Also acquired was a contemporaneous low dose non-contrast CT scan performed for attenuation correction of PET images and anatomic localization. The PET and CT images were digitally fused for display. All images were acquired on a combined PET-CT scanner unit. Some areas of FDG accumulation may be described in standardized uptake value (SUV) units.   CODING: Initial Treatment Strategy (PI)   CALIBRATION: Dose Injection-to-Scan Interval (mins): 75 min Mediastinal bloodpool SUV (normal 1.5-2.5): 2.2 Blood glucose: 159 mg/dL   FINDINGS: HEAD AND NECK: No evidence of focal hypermetabolic lesion in the brain parenchyma, noting that evaluation is limited because of the expected physiologic diffuse FDG uptake in the brain. No focal hypermetabolic soft tissue lesion is seen in the neck. No hypermetabolic cervical lymphadenopathy is present.   CHEST: Anterior right upper lobe nodule is seen, with mild FDG avidity (SUV max 1.4). No evidence of hypermetabolic mediastinal, hilar or axillary lymphadenopathy.   ABDOMEN AND PELVIS: No hypermetabolic soft tissue lesion is present in the abdomen and pelvis. No evidence of hypermetabolic lymphadenopathy. Physiologic radiotracer uptake is present in the liver and spleen with excretion into the bowel loops and the genitourinary tract.   MUSCULOSKELETAL/EXTREMITIES: No focal hypermetabolic lesion is seen in the axial or appendicular to suggest osseous metastasis. Mild FDG-avid soft tissue opacification in the anterior abdominal wall from prior laparotomy, likely postsurgical.       1. Anterior right upper lobe nodule is seen, with mild FDG avidity, for  which a slow growing neoplasm is not excluded. Follow-up CT chest is recommended. 2. No hypermetabolic rupesh or distant malignancy.   I personally reviewed the images/study and I agree with the findings as stated by Li Thomason MD. This study was interpreted at [place], [place].   MACRO: None   Signed by: Madi Bedoya 8/20/2024 1:05 PM Dictation workstation:   [id number]    ECG 12 lead (Clinic Performed)    Result Date: 8/13/2024  Sinus rhythm nonspecific ST and T changes    US thyroid    Result Date: 8/8/2024  Interpreted By:  Ramses Phillips, STUDY: US THYROID;  8/7/2024 4:08 pm   INDICATION: Signs/Symptoms:nodules.   COMPARISON: No prior ultrasound available.   ACCESSION NUMBER(S): [id number]   ORDERING CLINICIAN: SHUBHAM HURTADO   TECHNIQUE: Multiple ultrasonographic images of the thyroid gland and surrounding tissues were obtained.   FINDINGS: PARENCHYMA:  Mildly heterogenous background echogenicity.   SIZE: RIGHT LOBE: 4.2 x 1.7 x 1.7 cm LEFT LOBE: 5.3 x 3 x 2.8 cm ISTHMUS: 6 mm in AP diameter   NODULES: (Please note, assessment and description of nodules is per TI-RADS criteria. Up to 4 total nodules described, which includes largest and/or most clinically significant based on morphology.) It is noted that some spongiform and/or cystic nodules may not be specifically described and are TR category 1 (benign).   NODULE #: 1.   Location: Right lobe mid aspect Size: 1 x 0.8 x 1 cm Composition:  Solid or almost completely solid (2) Echogenicity:  Hyperechoic or isoechoic (1) Shape:  Wider-than-tall (0) Margin:  Ill-defined (0) Echogenic Foci:  None or Large comet-tail artifacts (0)   The total score of this nodule is 3 points, corresponding to a TI-RADS category  3; (3 points) Mildly suspicious.   NODULE #: 2.   Location: Right thyroid lobe inferior aspect Size: 2.2 x 1.8 x 1.1 cm Composition: Solid or almost completely solid (2) Echogenicity: Hyperechoic or  isoechoic (1) Shape:  Wider-than-tall (0) Margin:  Ill-defined (0) Echogenic Foci: None or Large comet-tail artifacts (0)   The total score of this nodule is 3 points, corresponding to a TI-RADS category 3; (3 points) Mildly suspicious.   NODULE #: 3.   Location: Left thyroid lobe mid aspect Size: 3.7 x 2.7 x 2.5 cm Composition: Solid or almost completely solid (2) Echogenicity: Hyperechoic or isoechoic (1) Shape:  Wider-than-tall (0) Margin:  Ill-defined (0) Echogenic Foci: None or Large comet-tail artifacts (0)   The total score of this nodule is 3 points, corresponding to a TI-RADS category 3; (3 points) Mildly suspicious.   NODULE #: 4.   Location: Left thyroid lobe inferior aspect Size: 2.6 x 1.7 x 2.4 cm Composition: Solid or almost completely solid (2) Echogenicity: Hyperechoic or isoechoic (1) Shape:  Wider-than-tall (0) Margin:  Ill-defined (0) Echogenic Foci: None or Large comet-tail artifacts (0)   The total score of this nodule is 3 points, corresponding to a TI-RADS category 3; (3 points) Mildly suspicious.         1. Enlarged mildly heterogeneous thyroid containing multiple varying sized TI-RADS 3 solid nodules with the largest in the left lobe measuring 3.7 cm and 2.6 cm in greatest diameter respectively.         Please note that these statements are based on the recommendations of the American College of [employer]iology   TI-RADS grading system. ACR TI-RADS recommendations (apply to nodules which have NOT been biopsied):   TR5 (?7 points) highly suspicious - FNA if ? 1cm, follow-up if 0.5 -0.9 cm every year for 5 years. Aggregate cancer risk 35%. TR4 (4-6 points) moderately suspicious - FNA if ? 1.5cm, follow-up if 1 -1.4 cm in 1, 2, 3 and 5 years. Aggregate cancer risk 9.1% TR3 (3 points) mildly suspicious - FNA if ? 2.5cm, follow-up if 1.5 -2.4 cm in 1, 3 and 5 years. Aggregate cancer risk 4.8% TR2 (2 points) not suspicious. Aggregate cancer risk 1.5% TR1 (0 points) benign - No FNA or follow-up.  Aggregate cancer risk 0.3%   MACRO: None.   Signed by: Ramses Phillips 8/8/2024 5:59 PM Dictation workstation:   [id number]    BI mammo bilateral diagnostic tomosynthesis    Result Date: 8/7/2024  Interpreted By:  Yosi Maldonado, STUDY: BI MAMMO BILATERAL DIAGNOSTIC TOMOSYNTHESIS; BI US BREAST LIMITED LEFT;  8/7/2024 8:58 am; 8/7/2024 9:19 am   ACCESSION NUMBER(S): [id number]; [id number]   ORDERING CLINICIAN: SHUBHAM HURTADO   INDICATION: Patient presents for annual exam and evaluation of a superior left breast mass seen on recent CT of the chest. History of left lumpectomy with radiation therapy.   COMPARISON: CT from 07/26/2024, mammograms from 06/04/2021, 06/03/2020, 09/27/2018   FINDINGS: MAMMOGRAPHY: 2D and tomosynthesis images were reviewed at 1 mm slice thickness.   Density:  There are areas of scattered fibroglandular tissue.   Focal asymmetry in the superior central left breast at anterior depth which appears stable when compared to multiple prior exams and likely corresponds to the mass seen on CT. Stable postsurgical changes in the upper outer left breast at posterior depth and left axilla. Stable left breast post radiation changes. No suspicious masses or calcifications are identified.   ULTRASOUND: Targeted ultrasound was performed of the left breast by a registered sonographer with elastography. No focal sonographic abnormalities visualized in the superior left breast. The tissue is soft on elastography. Previously-seen focal asymmetry likely represents stable fibroglandular tissue.       No mammographic or targeted sonographic evidence of malignancy. Stable left breast post treatment changes. Patient may resume annual screening.   BI-RADS CATEGORY: BI-RADS Category:  2 Benign. Recommendation:  Annual Screening. Recommended Date:  1 Year. Laterality:  Bilateral.   For any future breast imaging appointments, please call [phone]).     MACRO: None   Signed by: Yosi Maldonado 8/7/2024 3:36 PM  Dictation workstation:   [id number]    BI US breast limited left    Result Date: 8/7/2024  Interpreted By:  Yosi Maldonado, STUDY: BI MAMMO BILATERAL DIAGNOSTIC TOMOSYNTHESIS; BI US BREAST LIMITED LEFT;  8/7/2024 8:58 am; 8/7/2024 9:19 am   ACCESSION NUMBER(S): [id number]; [id number]   ORDERING CLINICIAN: SHUBHAM HURTADO   INDICATION: Patient presents for annual exam and evaluation of a superior left breast mass seen on recent CT of the chest. History of left lumpectomy with radiation therapy.   COMPARISON: CT from 07/26/2024, mammograms from 06/04/2021, 06/03/2020, 09/27/2018   FINDINGS: MAMMOGRAPHY: 2D and tomosynthesis images were reviewed at 1 mm slice thickness.   Density:  There are areas of scattered fibroglandular tissue.   Focal asymmetry in the superior central left breast at anterior depth which appears stable when compared to multiple prior exams and likely corresponds to the mass seen on CT. Stable postsurgical changes in the upper outer left breast at posterior depth and left axilla. Stable left breast post radiation changes. No suspicious masses or calcifications are identified.   ULTRASOUND: Targeted ultrasound was performed of the left breast by a registered sonographer with elastography. No focal sonographic abnormalities visualized in the superior left breast. The tissue is soft on elastography. Previously-seen focal asymmetry likely represents stable fibroglandular tissue.       No mammographic or targeted sonographic evidence of malignancy. Stable left breast post treatment changes. Patient may resume annual screening.   BI-RADS CATEGORY: BI-RADS Category:  2 Benign. Recommendation:  Annual Screening. Recommended Date:  1 Year. Laterality:  Bilateral.   For any future breast imaging appointments, please call [phone] ([phone]).     MACRO: None   Signed by: Yosi Maldonado 8/7/2024 3:36 PM Dictation workstation:   [id number]    CT chest wo IV contrast    Result Date: 7/27/2024  Interpreted By:   Dallas Gold and Ebai Jerky STUDY: CT CHEST WO IV CONTRAST;  7/26/2024 8:18 am   INDICATION: Signs/Symptoms:lung nodule surveillance.   Per EMR: Patient with a history of smoking, left breast cancer status post lumpectomy and chemoradiation, acute on chronic diastolic heart failure presenting for follow-up of a lung nodule seen on CT MRI cardiac dated 03/07/2024..   COMPARISON: CT chest abdomen pelvis 08/03/2020.   ACCESSION NUMBER(S): [id number]   ORDERING CLINICIAN: PAYAL MCDONALD   TECHNIQUE: Contiguous axial images of the chest and upper abdomen were obtained without contrast. after the intravenous administration of iodinated contrast. Coronal and sagittal reformatted images were reconstructed from the axial data.   FINDINGS:     MEDIASTINUM AND LYMPH NODES: Heterogeneous enlarged appearance of the thyroid gland containing multiple hypodense thyroid nodules. The esophageal wall appears within normal limits.  No enlarged intrathoracic or axillary lymph nodes by imaging criteria. No pneumomediastinum.   VESSELS:  Normal caliber thoracic aorta . Mild aortic atherosclerosis.   HEART: Normal in size.  Mild coronary artery calcifications. No significant pericardial effusion.   LUNG, AIRWAYS, AND PLEURA: Subpleural reticular opacities within the anterior aspect of the left upper lobe, most compatible with post radiation changes. There are patchy ground-glass opacities scattered throughout the bilateral upper lobes and right middle lobe and to a lesser extent the bilateral lower lobes. No consolidation, pulmonary edema, pleural effusion or pneumothorax.   There is a 1.0 x 0.8 cm subpleural anterior right upper lobe pulmonary nodule (series 205, image 95) consistent with a pulmonary nodule seen on the MRI cardiac scoring from 03/07/2024. Comparison to the MRI study is limited due to the difference in imaging technique, however it appears to have increased in size where it measured 0.7 x 0.5 cm.   OSSEOUS STRUCTURES:  No acute osseous abnormality. Mild diffuse osseous demineralization. Visualized surgical changes within the T1 vertebral body   CHEST WALL SOFT TISSUES: Postsurgical changes of left lumpectomy. There is a 1.3 cm soft tissue nodule within the left breast.   UPPER ABDOMEN/OTHER:   Multiple partially visualized left renal cystic lesions, likely represent simple cysts. Additionally, there multiple subcentimeter left renal cyst which are hyperdense and are incompletely characterized in the basis of this examination, 1 of which is new from the CT abdomen pelvis from 08/03/2022 measuring 1.2 cm (series 202, image 284).       1. There is a 1.0 x 0.8 cm subpleural anterior right upper lobe pulmonary nodule, consistent with the nodule seen on MRI cardiac scoring dated 03/07/2024. Comparison to the MRI examination is limited due to the difference in imaging modality, however it appears to have increased in size. Further evaluation with a PET scan and/or tissue biopsy is recommended. No thoracic lymphadenopathy. 2. Scattered ground-glass opacities throughout bilateral lungs suspicious for multifocal infectious/inflammation. 3. Heterogeneous enlarged appearance of the thyroid gland with multiple hypodense nodule, recommend correlation with thyroid ultrasound for multinodular goiter. 4. Postsurgical changes of left lumpectomy. There is a 1.3 cm nodular soft tissue density within the left breast, recommend correlation with breast mammogram. 5. Multiple partially visualized simple left renal cysts and a new incompletely characterized hypodense lesion measuring 1.2 cm. Further evaluation with an MRI renal protocol is recommended.     I personally reviewed the images/study and I agree with the findings as stated by Resident Gemini Rob. This study was interpreted at [facility], [place], [place].     MACRO: None.   Signed by: Dallas Gold 7/27/2024 4:46 PM Dictation workstation:    "[id number]      Pulmonary Functions Testing Results:    No results found for: \"FEV1\", \"FVC\", \"JBI8POY\", \"TLC\", \"DLCO\"    CT and PET personally reviewed     Assessment/Plan   Problem List Items Addressed This Visit             ICD-10-CM    Lung nodules R91.8    Relevant Orders    Complete Pulmonary Function Test Pre/Post Bronchodialator (Spirometry Pre/Post/DLCO/Lung Volumes)    Spirometry Pre/Post Bronchodilator    DLCO / Diffusion Capacity     Other Visit Diagnoses         Codes    Former smoker     Z87.891    History of breast cancer     Z85.3            Ms. Nunes is a pleasant 74 year old female h/o left breast cancer s/p lumpectomy with adjuvant chemo/RT. She presents for a slowly enlarging, mildly PET avid, right upper lobe 1.0 cm lung nodule. I d/w patient and her daughters the ddx of lung nodules including infectious/inflammatory or malignancy - including primary lung vs breast metastatic lesion. PET showed no other sites of disease. Discussed options for biopsy - patient would like to proceed with surgical wedge with potential for treatment. We will plan to obtain new PFTs (2022 numbers appear reasonable) and send her for PAT.        I spent 70 minutes in the professional and overall care of this patient.      Katya Bansal, DO  Thoracic & Esophageal Surgery     "

## 2024-08-20 NOTE — H&P (VIEW-ONLY)
Chief complaint:  RUL lung nodule    History Of Present Illness  Serjio Nunes is a 74 y.o. female, current smoker (quit >10 years but recent stressors has started again; 20 pack year hx max), presenting with a right upper lobe lung nodule. Patient was referred by Peyton Alcazar.  Additional h/o left breast cancer status post lumpectomy and chemoradiation, acute on chronic diastolic heart failure presenting for follow-up of a lung nodule seen on CT MRI cardiac dated 03/07/2024.     CT scan obtained 7/26 showed a 1cm right upper lobe nodule slightly increased in size compared to MRI. Patient had a recent pneumonia causing SOB and cough but these sxs have overall improved since she finished course of abx >1 week ago. Has had stomach upset and indigestion. Along with this has had some issues with weight loss (approx 25+ lbs/the last few months).     No history of MI or CVA. Could walk a mile or climb 2 flights of stairs: yes     Past Medical History  She has a past medical history of Breast cancer (Multi), Cervicalgia (09/28/2016), Encounter for general adult medical examination without abnormal findings (10/31/2019), Encounter for general adult medical examination without abnormal findings (04/13/2021), antineoplastic chemo, Lung nodule, Other conditions influencing health status (11/05/2019), Pain in unspecified limb (09/28/2016), Personal history of irradiation, Personal history of other diseases of the musculoskeletal system and connective tissue (09/28/2016), Personal history of other diseases of the respiratory system (11/05/2019), Personal history of other diseases of the respiratory system (11/05/2019), Personal history of other drug therapy (09/29/2017), Personal history of other specified conditions (11/05/2019), Rash and other nonspecific skin eruption (11/05/2019), and Unspecified abdominal hernia without obstruction or gangrene.    Surgical History  She has a past surgical history that includes Shoulder  surgery (10/15/2018); Hernia repair (10/15/2018); Foot surgery (09/29/2016); Back surgery (09/29/2016); Neck surgery (09/29/2016); Breast lumpectomy; and Breast biopsy.     Social History  She reports that she quit smoking about 11 years ago. Her smoking use included cigarettes. She has never used smokeless tobacco. She reports that she does not currently use alcohol. She reports current drug use. Frequency: 7.00 times per week. Drug: Marijuana.    Family History  Family history of cancer: Yes  Family History   Problem Relation Name Age of Onset    Cancer Mother          COLORECTAL    Arthritis Father      Hypertension Father          Medications    Current Outpatient Medications:     albuterol 90 mcg/actuation inhaler, Inhale 1 puff 3 times a day., Disp: 18 g, Rfl: 2    amLODIPine (Norvasc) 10 mg tablet, TAKE 1 TABLET BY MOUTH EVERY DAY, Disp: 90 tablet, Rfl: 3    aspirin 81 mg EC tablet, Take 1 tablet (81 mg) by mouth once daily., Disp: , Rfl:     busPIRone (Buspar) 5 mg tablet, TAKE 1 TABLET (5 MG) BY MOUTH 2 TIMES A DAY AS NEEDED (WHEN FEELING ANXIOUS)., Disp: 180 tablet, Rfl: 3    carvedilol (Coreg) 25 mg tablet, TAKE 1 TABLET BY MOUTH TWICE A DAY, Disp: 180 tablet, Rfl: 3    cholecalciferol (Vitamin D-3) 50 MCG (2000 UT) tablet, Take 1 tablet (2,000 Units) by mouth once daily., Disp: , Rfl:     colchicine, gout, 0.6 mg tablet, TAKE 1 TABLET BY MOUTH EVERY DAY, Disp: 90 tablet, Rfl: 1    fluticasone (Flonase) 50 mcg/actuation nasal spray, Administer 1 spray into each nostril once daily., Disp: , Rfl:     fluticasone propion-salmeteroL (Advair Diskus) 250-50 mcg/dose diskus inhaler, Inhale 1 puff twice a day., Disp: , Rfl:     furosemide (Lasix) 40 mg tablet, Take 0.5 tablets (20 mg) by mouth once daily., Disp: 30 tablet, Rfl: 3    ipratropium-albuteroL (Duo-Neb) 0.5-2.5 mg/3 mL nebulizer solution, TAKE 3 ML BY NEBULIZATION 3 TIMES A DAY, Disp: 180 mL, Rfl: 2    Klor-Con M10 10 mEq ER tablet, TAKE 1 TABLET BY  MOUTH EVERY DAY, Disp: 90 tablet, Rfl: 3    losartan (Cozaar) 100 mg tablet, Take 0.5 tablets (50 mg) by mouth once daily., Disp: 90 tablet, Rfl: 0    nitroglycerin (Nitrostat) 0.4 mg SL tablet, Place 1 tablet (0.4 mg) under the tongue every 5 minutes if needed for chest pain., Disp: 90 tablet, Rfl: 11    omeprazole OTC (PriLOSEC OTC) 20 mg EC tablet, Take 1 tablet (20 mg) by mouth once daily. Do not crush, chew, or split., Disp: 30 tablet, Rfl: 11    promethazine (Phenergan) 6.25 mg/5 mL syrup, TAKE 10 ML (12.5 MG) BY MOUTH EVERY 6 HOURS IF NEEDED FOR NAUSEA OR VOMITING FOR UP TO 7 DAYS., Disp: 120 mL, Rfl: 0    tiZANidine (Zanaflex) 4 mg tablet, TAKE 1 TABLET (4 MG) BY MOUTH EVERY 6 HOURS IF NEEDED FOR MUSCLE SPASMS, Disp: 90 tablet, Rfl: 1    Tradjenta 5 mg tablet, TAKE 1 TABLET BY MOUTH EVERY DAY AS DIRECTED, Disp: 90 tablet, Rfl: 1    clotrimazole-betamethasone (Lotrisone) cream, APPLY THIN COAT TO AFFECTED AREA TWICE A DAY IN THE MORNING AND IN THE EVENING, Disp: 45 g, Rfl: 3    fluocinonide 0.05 % cream, Apply topically 2 times a day. APPLY SPARINGLY TO AFFECTED AREA, Disp: , Rfl:     FreeStyle Jaycob 14 Day Sensor kit, APPLY SENSOR TO BACK UPPER ARM REMOVE AND REPLACE EVERY 14 DAYS USE WITH DEVICE, Disp: 10 each, Rfl: 3    guaiFENesin (Mucinex) 600 mg 12 hr tablet, Take 2 tablets (1,200 mg) by mouth 2 times a day. Do not crush, chew, or split., Disp: 120 tablet, Rfl: 11    miconazole (Micotin) 2 % cream, miconazole nitrate 2 % topical cream  APPLY SPARINGLY TO AFFECTED AREA TWICE A DAY, Disp: , Rfl:     omeprazole (PriLOSEC) 20 mg DR capsule, , Disp: , Rfl:   No current facility-administered medications for this visit.    Allergies  Iodinated contrast media, Strawberry, Iodine, Other, and Latex    Review of Systems:  Review of Systems   Constitutional: No fevers, chills, unexpected weight change  HENT: No sore throat, congestion, or nasal drainage  Eyes: No visual changes or eye itching  Respiratory: see HPI.  "No cough, worsening dyspnea, wheezing  Cardiac: No chest pain, palpitations, or lower extremity edema  Gastrointestinal: No nausea, vomiting, diarrhea. No abdominal pain  Genitourinary: No dysuria or hematuria  Musculoskeletal: No back pain. No significant myalgias or arthralgias  Neurologic: No headaches, dizziness, or seizures.  Hematologic: No east bleeding or bruising.  Psychiatric: No anxiety or depression.    Physical Exam:  Physical Exam  /72   Pulse 82   Temp 36 °C (96.8 °F)   Ht 1.6 m (5' 3\")   Wt 75.8 kg (167 lb)   SpO2 98%   BMI 29.58 kg/m²   Constitutional:       General: Patient is not in acute distress.     Appearance: Normal appearance; not ill-appearing.   HENT:      Head: Normocephalic.      Nose: No congestion or rhinorrhea.   Cardiovascular:      Rate and Rhythm: Normal rate and regular rhythm.      Pulses: Normal pulses.   Pulmonary:      Effort: Pulmonary effort is normal. No respiratory distress.  No conversational dyspnea     Breath sounds: No stridor. No wheezing.   Abdominal:      General: There is no distension.      Palpations: Abdomen is soft.      Tenderness: There is no abdominal tenderness.   Musculoskeletal:         General: No swelling, tenderness or deformity. Normal range of motion.      Cervical back: Normal range of motion. No rigidity.   Lymphadenopathy:      Cervical: No cervical adenopathy.   Skin:     General: Skin is warm and dry.   Neurological:      General: No focal deficit present.      Mental Status: Patient is alert and oriented to person, place, and time.   Psychiatric:         Mood and Affect: Mood normal.     Relevant Results    Pathology:  N/a     Imaging:    NM PET CT lung SPN    Result Date: 8/20/2024  Interpreted By:  Madi Bedoya and Maltbie Grace STUDY: NM PET CT LUNG SPN;  8/20/2024 10:01 am   INDICATION: Signs/Symptoms:lung nodules.   COMPARISON: MRI kidney 08/19/2024 Pelvis 08/03/2020 CT chest 07/26/2024   ACCESSION NUMBER(S): [id number]   " ORDERING CLINICIAN: SHUBHAM HURTADO   TECHNIQUE: TECHNIQUE DIVISION OF NUCLEAR MEDICINE POSITRON EMISSION TOMOGRAPHY (PET-CT)   The patient received an intravenous dose of 12.2 mCi of Fluorine-18 fluorodeoxyglucose (FDG). Positron emission tomographic (PET) images from skull base to the mid-thighs were then acquired after a one hour delay. Also acquired was a contemporaneous low dose non-contrast CT scan performed for attenuation correction of PET images and anatomic localization. The PET and CT images were digitally fused for display. All images were acquired on a combined PET-CT scanner unit. Some areas of FDG accumulation may be described in standardized uptake value (SUV) units.   CODING: Initial Treatment Strategy (PI)   CALIBRATION: Dose Injection-to-Scan Interval (mins): 75 min Mediastinal bloodpool SUV (normal 1.5-2.5): 2.2 Blood glucose: 159 mg/dL   FINDINGS: HEAD AND NECK: No evidence of focal hypermetabolic lesion in the brain parenchyma, noting that evaluation is limited because of the expected physiologic diffuse FDG uptake in the brain. No focal hypermetabolic soft tissue lesion is seen in the neck. No hypermetabolic cervical lymphadenopathy is present.   CHEST: Anterior right upper lobe nodule is seen, with mild FDG avidity (SUV max 1.4). No evidence of hypermetabolic mediastinal, hilar or axillary lymphadenopathy.   ABDOMEN AND PELVIS: No hypermetabolic soft tissue lesion is present in the abdomen and pelvis. No evidence of hypermetabolic lymphadenopathy. Physiologic radiotracer uptake is present in the liver and spleen with excretion into the bowel loops and the genitourinary tract.   MUSCULOSKELETAL/EXTREMITIES: No focal hypermetabolic lesion is seen in the axial or appendicular to suggest osseous metastasis. Mild FDG-avid soft tissue opacification in the anterior abdominal wall from prior laparotomy, likely postsurgical.       1. Anterior right upper lobe nodule is seen, with mild FDG avidity, for  which a slow growing neoplasm is not excluded. Follow-up CT chest is recommended. 2. No hypermetabolic rupesh or distant malignancy.   I personally reviewed the images/study and I agree with the findings as stated by Li Thomason MD. This study was interpreted at [place], [place].   MACRO: None   Signed by: Madi Bedoya 8/20/2024 1:05 PM Dictation workstation:   [id number]    ECG 12 lead (Clinic Performed)    Result Date: 8/13/2024  Sinus rhythm nonspecific ST and T changes    US thyroid    Result Date: 8/8/2024  Interpreted By:  Ramses Phillips, STUDY: US THYROID;  8/7/2024 4:08 pm   INDICATION: Signs/Symptoms:nodules.   COMPARISON: No prior ultrasound available.   ACCESSION NUMBER(S): [id number]   ORDERING CLINICIAN: SHUBHAM HURTADO   TECHNIQUE: Multiple ultrasonographic images of the thyroid gland and surrounding tissues were obtained.   FINDINGS: PARENCHYMA:  Mildly heterogenous background echogenicity.   SIZE: RIGHT LOBE: 4.2 x 1.7 x 1.7 cm LEFT LOBE: 5.3 x 3 x 2.8 cm ISTHMUS: 6 mm in AP diameter   NODULES: (Please note, assessment and description of nodules is per TI-RADS criteria. Up to 4 total nodules described, which includes largest and/or most clinically significant based on morphology.) It is noted that some spongiform and/or cystic nodules may not be specifically described and are TR category 1 (benign).   NODULE #: 1.   Location: Right lobe mid aspect Size: 1 x 0.8 x 1 cm Composition:  Solid or almost completely solid (2) Echogenicity:  Hyperechoic or isoechoic (1) Shape:  Wider-than-tall (0) Margin:  Ill-defined (0) Echogenic Foci:  None or Large comet-tail artifacts (0)   The total score of this nodule is 3 points, corresponding to a TI-RADS category  3; (3 points) Mildly suspicious.   NODULE #: 2.   Location: Right thyroid lobe inferior aspect Size: 2.2 x 1.8 x 1.1 cm Composition: Solid or almost completely solid (2) Echogenicity: Hyperechoic or  isoechoic (1) Shape:  Wider-than-tall (0) Margin:  Ill-defined (0) Echogenic Foci: None or Large comet-tail artifacts (0)   The total score of this nodule is 3 points, corresponding to a TI-RADS category 3; (3 points) Mildly suspicious.   NODULE #: 3.   Location: Left thyroid lobe mid aspect Size: 3.7 x 2.7 x 2.5 cm Composition: Solid or almost completely solid (2) Echogenicity: Hyperechoic or isoechoic (1) Shape:  Wider-than-tall (0) Margin:  Ill-defined (0) Echogenic Foci: None or Large comet-tail artifacts (0)   The total score of this nodule is 3 points, corresponding to a TI-RADS category 3; (3 points) Mildly suspicious.   NODULE #: 4.   Location: Left thyroid lobe inferior aspect Size: 2.6 x 1.7 x 2.4 cm Composition: Solid or almost completely solid (2) Echogenicity: Hyperechoic or isoechoic (1) Shape:  Wider-than-tall (0) Margin:  Ill-defined (0) Echogenic Foci: None or Large comet-tail artifacts (0)   The total score of this nodule is 3 points, corresponding to a TI-RADS category 3; (3 points) Mildly suspicious.         1. Enlarged mildly heterogeneous thyroid containing multiple varying sized TI-RADS 3 solid nodules with the largest in the left lobe measuring 3.7 cm and 2.6 cm in greatest diameter respectively.         Please note that these statements are based on the recommendations of the American College of [employer]   TI-RADS grading system. ACR TI-RADS recommendations (apply to nodules which have NOT been biopsied):   TR5 (?7 points) highly suspicious - FNA if ? 1cm, follow-up if 0.5 -0.9 cm every year for 5 years. Aggregate cancer risk 35%. TR4 (4-6 points) moderately suspicious - FNA if ? 1.5cm, follow-up if 1 -1.4 cm in 1, 2, 3 and 5 years. Aggregate cancer risk 9.1% TR3 (3 points) mildly suspicious - FNA if ? 2.5cm, follow-up if 1.5 -2.4 cm in 1, 3 and 5 years. Aggregate cancer risk 4.8% TR2 (2 points) not suspicious. Aggregate cancer risk 1.5% TR1 (0 points) benign - No FNA or follow-up.  Aggregate cancer risk 0.3%   MACRO: None.   Signed by: Ramses Phillips 8/8/2024 5:59 PM Dictation workstation:   [id number]    BI mammo bilateral diagnostic tomosynthesis    Result Date: 8/7/2024  Interpreted By:  Yosi Maldonado, STUDY: BI MAMMO BILATERAL DIAGNOSTIC TOMOSYNTHESIS; BI US BREAST LIMITED LEFT;  8/7/2024 8:58 am; 8/7/2024 9:19 am   ACCESSION NUMBER(S): [id number]; [id number]   ORDERING CLINICIAN: SHUBHAM HURTADO   INDICATION: Patient presents for annual exam and evaluation of a superior left breast mass seen on recent CT of the chest. History of left lumpectomy with radiation therapy.   COMPARISON: CT from 07/26/2024, mammograms from 06/04/2021, 06/03/2020, 09/27/2018   FINDINGS: MAMMOGRAPHY: 2D and tomosynthesis images were reviewed at 1 mm slice thickness.   Density:  There are areas of scattered fibroglandular tissue.   Focal asymmetry in the superior central left breast at anterior depth which appears stable when compared to multiple prior exams and likely corresponds to the mass seen on CT. Stable postsurgical changes in the upper outer left breast at posterior depth and left axilla. Stable left breast post radiation changes. No suspicious masses or calcifications are identified.   ULTRASOUND: Targeted ultrasound was performed of the left breast by a registered sonographer with elastography. No focal sonographic abnormalities visualized in the superior left breast. The tissue is soft on elastography. Previously-seen focal asymmetry likely represents stable fibroglandular tissue.       No mammographic or targeted sonographic evidence of malignancy. Stable left breast post treatment changes. Patient may resume annual screening.   BI-RADS CATEGORY: BI-RADS Category:  2 Benign. Recommendation:  Annual Screening. Recommended Date:  1 Year. Laterality:  Bilateral.   For any future breast imaging appointments, please call [phone]).     MACRO: None   Signed by: Yosi Maldonado 8/7/2024 3:36 PM  Dictation workstation:   [id number]    BI US breast limited left    Result Date: 8/7/2024  Interpreted By:  Yosi Maldonado, STUDY: BI MAMMO BILATERAL DIAGNOSTIC TOMOSYNTHESIS; BI US BREAST LIMITED LEFT;  8/7/2024 8:58 am; 8/7/2024 9:19 am   ACCESSION NUMBER(S): [id number]; [id number]   ORDERING CLINICIAN: SHUBHAM HURTADO   INDICATION: Patient presents for annual exam and evaluation of a superior left breast mass seen on recent CT of the chest. History of left lumpectomy with radiation therapy.   COMPARISON: CT from 07/26/2024, mammograms from 06/04/2021, 06/03/2020, 09/27/2018   FINDINGS: MAMMOGRAPHY: 2D and tomosynthesis images were reviewed at 1 mm slice thickness.   Density:  There are areas of scattered fibroglandular tissue.   Focal asymmetry in the superior central left breast at anterior depth which appears stable when compared to multiple prior exams and likely corresponds to the mass seen on CT. Stable postsurgical changes in the upper outer left breast at posterior depth and left axilla. Stable left breast post radiation changes. No suspicious masses or calcifications are identified.   ULTRASOUND: Targeted ultrasound was performed of the left breast by a registered sonographer with elastography. No focal sonographic abnormalities visualized in the superior left breast. The tissue is soft on elastography. Previously-seen focal asymmetry likely represents stable fibroglandular tissue.       No mammographic or targeted sonographic evidence of malignancy. Stable left breast post treatment changes. Patient may resume annual screening.   BI-RADS CATEGORY: BI-RADS Category:  2 Benign. Recommendation:  Annual Screening. Recommended Date:  1 Year. Laterality:  Bilateral.   For any future breast imaging appointments, please call [phone]).     MACRO: None   Signed by: Yosi Maldonado 8/7/2024 3:36 PM Dictation workstation:   [id number]    CT chest wo IV contrast    Result Date: 7/27/2024  Interpreted By:   Dallas Gold and Ebai Jerky STUDY: CT CHEST WO IV CONTRAST;  7/26/2024 8:18 am   INDICATION: Signs/Symptoms:lung nodule surveillance.   Per EMR: Patient with a history of smoking, left breast cancer status post lumpectomy and chemoradiation, acute on chronic diastolic heart failure presenting for follow-up of a lung nodule seen on CT MRI cardiac dated 03/07/2024..   COMPARISON: CT chest abdomen pelvis 08/03/2020.   ACCESSION NUMBER(S): [id number]   ORDERING CLINICIAN: PAYAL MCDONALD   TECHNIQUE: Contiguous axial images of the chest and upper abdomen were obtained without contrast. after the intravenous administration of iodinated contrast. Coronal and sagittal reformatted images were reconstructed from the axial data.   FINDINGS:     MEDIASTINUM AND LYMPH NODES: Heterogeneous enlarged appearance of the thyroid gland containing multiple hypodense thyroid nodules. The esophageal wall appears within normal limits.  No enlarged intrathoracic or axillary lymph nodes by imaging criteria. No pneumomediastinum.   VESSELS:  Normal caliber thoracic aorta . Mild aortic atherosclerosis.   HEART: Normal in size.  Mild coronary artery calcifications. No significant pericardial effusion.   LUNG, AIRWAYS, AND PLEURA: Subpleural reticular opacities within the anterior aspect of the left upper lobe, most compatible with post radiation changes. There are patchy ground-glass opacities scattered throughout the bilateral upper lobes and right middle lobe and to a lesser extent the bilateral lower lobes. No consolidation, pulmonary edema, pleural effusion or pneumothorax.   There is a 1.0 x 0.8 cm subpleural anterior right upper lobe pulmonary nodule (series 205, image 95) consistent with a pulmonary nodule seen on the MRI cardiac scoring from 03/07/2024. Comparison to the MRI study is limited due to the difference in imaging technique, however it appears to have increased in size where it measured 0.7 x 0.5 cm.   OSSEOUS STRUCTURES:  No acute osseous abnormality. Mild diffuse osseous demineralization. Visualized surgical changes within the T1 vertebral body   CHEST WALL SOFT TISSUES: Postsurgical changes of left lumpectomy. There is a 1.3 cm soft tissue nodule within the left breast.   UPPER ABDOMEN/OTHER:   Multiple partially visualized left renal cystic lesions, likely represent simple cysts. Additionally, there multiple subcentimeter left renal cyst which are hyperdense and are incompletely characterized in the basis of this examination, 1 of which is new from the CT abdomen pelvis from 08/03/2022 measuring 1.2 cm (series 202, image 284).       1. There is a 1.0 x 0.8 cm subpleural anterior right upper lobe pulmonary nodule, consistent with the nodule seen on MRI cardiac scoring dated 03/07/2024. Comparison to the MRI examination is limited due to the difference in imaging modality, however it appears to have increased in size. Further evaluation with a PET scan and/or tissue biopsy is recommended. No thoracic lymphadenopathy. 2. Scattered ground-glass opacities throughout bilateral lungs suspicious for multifocal infectious/inflammation. 3. Heterogeneous enlarged appearance of the thyroid gland with multiple hypodense nodule, recommend correlation with thyroid ultrasound for multinodular goiter. 4. Postsurgical changes of left lumpectomy. There is a 1.3 cm nodular soft tissue density within the left breast, recommend correlation with breast mammogram. 5. Multiple partially visualized simple left renal cysts and a new incompletely characterized hypodense lesion measuring 1.2 cm. Further evaluation with an MRI renal protocol is recommended.     I personally reviewed the images/study and I agree with the findings as stated by Resident Gemini Rob. This study was interpreted at [facility], [place], [place].     MACRO: None.   Signed by: Dallas Gold 7/27/2024 4:46 PM Dictation workstation:    "[id number]      Pulmonary Functions Testing Results:    No results found for: \"FEV1\", \"FVC\", \"CVU5UGC\", \"TLC\", \"DLCO\"    CT and PET personally reviewed     Assessment/Plan   Problem List Items Addressed This Visit             ICD-10-CM    Lung nodules R91.8    Relevant Orders    Complete Pulmonary Function Test Pre/Post Bronchodialator (Spirometry Pre/Post/DLCO/Lung Volumes)    Spirometry Pre/Post Bronchodilator    DLCO / Diffusion Capacity     Other Visit Diagnoses         Codes    Former smoker     Z87.891    History of breast cancer     Z85.3            Ms. Nunes is a pleasant 74 year old female h/o left breast cancer s/p lumpectomy with adjuvant chemo/RT. She presents for a slowly enlarging, mildly PET avid, right upper lobe 1.0 cm lung nodule. I d/w patient and her daughters the ddx of lung nodules including infectious/inflammatory or malignancy - including primary lung vs breast metastatic lesion. PET showed no other sites of disease. Discussed options for biopsy - patient would like to proceed with surgical wedge with potential for treatment. We will plan to obtain new PFTs (2022 numbers appear reasonable) and send her for PAT.        I spent 70 minutes in the professional and overall care of this patient.      Katya Bansal, DO  Thoracic & Esophageal Surgery     "

## 2024-08-20 NOTE — PROGRESS NOTES
Chief complaint:  RUL lung nodule    History Of Present Illness  Serjio Nunes is a 74 y.o. female, current smoker (quit >10 years but recent stressors has started again; 20 pack year hx max), presenting with a right upper lobe lung nodule. Patient was referred by Peyton Alcazar.  Additional h/o left breast cancer status post lumpectomy and chemoradiation, acute on chronic diastolic heart failure presenting for follow-up of a lung nodule seen on CT MRI cardiac dated 03/07/2024.     CT scan obtained 7/26 showed a 1cm right upper lobe nodule slightly increased in size compared to MRI. Patient had a recent pneumonia causing SOB and cough but these sxs have overall improved since she finished course of abx >1 week ago. Has had stomach upset and indigestion. Along with this has had some issues with weight loss (approx 25+ lbs/the last few months).     No history of MI or CVA. Could walk a mile or climb 2 flights of stairs: yes     Past Medical History  She has a past medical history of Breast cancer (Multi), Cervicalgia (09/28/2016), Encounter for general adult medical examination without abnormal findings (10/31/2019), Encounter for general adult medical examination without abnormal findings (04/13/2021), antineoplastic chemo, Lung nodule, Other conditions influencing health status (11/05/2019), Pain in unspecified limb (09/28/2016), Personal history of irradiation, Personal history of other diseases of the musculoskeletal system and connective tissue (09/28/2016), Personal history of other diseases of the respiratory system (11/05/2019), Personal history of other diseases of the respiratory system (11/05/2019), Personal history of other drug therapy (09/29/2017), Personal history of other specified conditions (11/05/2019), Rash and other nonspecific skin eruption (11/05/2019), and Unspecified abdominal hernia without obstruction or gangrene.    Surgical History  She has a past surgical history that includes Shoulder  surgery (10/15/2018); Hernia repair (10/15/2018); Foot surgery (09/29/2016); Back surgery (09/29/2016); Neck surgery (09/29/2016); Breast lumpectomy; and Breast biopsy.     Social History  She reports that she quit smoking about 11 years ago. Her smoking use included cigarettes. She has never used smokeless tobacco. She reports that she does not currently use alcohol. She reports current drug use. Frequency: 7.00 times per week. Drug: Marijuana.    Family History  Family history of cancer: Yes  Family History   Problem Relation Name Age of Onset    Cancer Mother          COLORECTAL    Arthritis Father      Hypertension Father          Medications    Current Outpatient Medications:     albuterol 90 mcg/actuation inhaler, Inhale 1 puff 3 times a day., Disp: 18 g, Rfl: 2    amLODIPine (Norvasc) 10 mg tablet, TAKE 1 TABLET BY MOUTH EVERY DAY, Disp: 90 tablet, Rfl: 3    aspirin 81 mg EC tablet, Take 1 tablet (81 mg) by mouth once daily., Disp: , Rfl:     busPIRone (Buspar) 5 mg tablet, TAKE 1 TABLET (5 MG) BY MOUTH 2 TIMES A DAY AS NEEDED (WHEN FEELING ANXIOUS)., Disp: 180 tablet, Rfl: 3    carvedilol (Coreg) 25 mg tablet, TAKE 1 TABLET BY MOUTH TWICE A DAY, Disp: 180 tablet, Rfl: 3    cholecalciferol (Vitamin D-3) 50 MCG (2000 UT) tablet, Take 1 tablet (2,000 Units) by mouth once daily., Disp: , Rfl:     colchicine, gout, 0.6 mg tablet, TAKE 1 TABLET BY MOUTH EVERY DAY, Disp: 90 tablet, Rfl: 1    fluticasone (Flonase) 50 mcg/actuation nasal spray, Administer 1 spray into each nostril once daily., Disp: , Rfl:     fluticasone propion-salmeteroL (Advair Diskus) 250-50 mcg/dose diskus inhaler, Inhale 1 puff twice a day., Disp: , Rfl:     furosemide (Lasix) 40 mg tablet, Take 0.5 tablets (20 mg) by mouth once daily., Disp: 30 tablet, Rfl: 3    ipratropium-albuteroL (Duo-Neb) 0.5-2.5 mg/3 mL nebulizer solution, TAKE 3 ML BY NEBULIZATION 3 TIMES A DAY, Disp: 180 mL, Rfl: 2    Klor-Con M10 10 mEq ER tablet, TAKE 1 TABLET BY  MOUTH EVERY DAY, Disp: 90 tablet, Rfl: 3    losartan (Cozaar) 100 mg tablet, Take 0.5 tablets (50 mg) by mouth once daily., Disp: 90 tablet, Rfl: 0    nitroglycerin (Nitrostat) 0.4 mg SL tablet, Place 1 tablet (0.4 mg) under the tongue every 5 minutes if needed for chest pain., Disp: 90 tablet, Rfl: 11    omeprazole OTC (PriLOSEC OTC) 20 mg EC tablet, Take 1 tablet (20 mg) by mouth once daily. Do not crush, chew, or split., Disp: 30 tablet, Rfl: 11    promethazine (Phenergan) 6.25 mg/5 mL syrup, TAKE 10 ML (12.5 MG) BY MOUTH EVERY 6 HOURS IF NEEDED FOR NAUSEA OR VOMITING FOR UP TO 7 DAYS., Disp: 120 mL, Rfl: 0    tiZANidine (Zanaflex) 4 mg tablet, TAKE 1 TABLET (4 MG) BY MOUTH EVERY 6 HOURS IF NEEDED FOR MUSCLE SPASMS, Disp: 90 tablet, Rfl: 1    Tradjenta 5 mg tablet, TAKE 1 TABLET BY MOUTH EVERY DAY AS DIRECTED, Disp: 90 tablet, Rfl: 1    clotrimazole-betamethasone (Lotrisone) cream, APPLY THIN COAT TO AFFECTED AREA TWICE A DAY IN THE MORNING AND IN THE EVENING, Disp: 45 g, Rfl: 3    fluocinonide 0.05 % cream, Apply topically 2 times a day. APPLY SPARINGLY TO AFFECTED AREA, Disp: , Rfl:     FreeStyle Jaycob 14 Day Sensor kit, APPLY SENSOR TO BACK UPPER ARM REMOVE AND REPLACE EVERY 14 DAYS USE WITH DEVICE, Disp: 10 each, Rfl: 3    guaiFENesin (Mucinex) 600 mg 12 hr tablet, Take 2 tablets (1,200 mg) by mouth 2 times a day. Do not crush, chew, or split., Disp: 120 tablet, Rfl: 11    miconazole (Micotin) 2 % cream, miconazole nitrate 2 % topical cream  APPLY SPARINGLY TO AFFECTED AREA TWICE A DAY, Disp: , Rfl:     omeprazole (PriLOSEC) 20 mg DR capsule, , Disp: , Rfl:   No current facility-administered medications for this visit.    Allergies  Iodinated contrast media, Strawberry, Iodine, Other, and Latex    Review of Systems:  Review of Systems   Constitutional: No fevers, chills, unexpected weight change  HENT: No sore throat, congestion, or nasal drainage  Eyes: No visual changes or eye itching  Respiratory: see HPI.  "No cough, worsening dyspnea, wheezing  Cardiac: No chest pain, palpitations, or lower extremity edema  Gastrointestinal: No nausea, vomiting, diarrhea. No abdominal pain  Genitourinary: No dysuria or hematuria  Musculoskeletal: No back pain. No significant myalgias or arthralgias  Neurologic: No headaches, dizziness, or seizures.  Hematologic: No east bleeding or bruising.  Psychiatric: No anxiety or depression.    Physical Exam:  Physical Exam  /72   Pulse 82   Temp 36 °C (96.8 °F)   Ht 1.6 m (5' 3\")   Wt 75.8 kg (167 lb)   SpO2 98%   BMI 29.58 kg/m²   Constitutional:       General: Patient is not in acute distress.     Appearance: Normal appearance; not ill-appearing.   HENT:      Head: Normocephalic.      Nose: No congestion or rhinorrhea.   Cardiovascular:      Rate and Rhythm: Normal rate and regular rhythm.      Pulses: Normal pulses.   Pulmonary:      Effort: Pulmonary effort is normal. No respiratory distress.  No conversational dyspnea     Breath sounds: No stridor. No wheezing.   Abdominal:      General: There is no distension.      Palpations: Abdomen is soft.      Tenderness: There is no abdominal tenderness.   Musculoskeletal:         General: No swelling, tenderness or deformity. Normal range of motion.      Cervical back: Normal range of motion. No rigidity.   Lymphadenopathy:      Cervical: No cervical adenopathy.   Skin:     General: Skin is warm and dry.   Neurological:      General: No focal deficit present.      Mental Status: Patient is alert and oriented to person, place, and time.   Psychiatric:         Mood and Affect: Mood normal.     Relevant Results    Pathology:  N/a     Imaging:    NM PET CT lung SPN    Result Date: 8/20/2024  Interpreted By:  Madi Bedoya and Maltbie Grace STUDY: NM PET CT LUNG SPN;  8/20/2024 10:01 am   INDICATION: Signs/Symptoms:lung nodules.   COMPARISON: MRI kidney 08/19/2024 Pelvis 08/03/2020 CT chest 07/26/2024   ACCESSION NUMBER(S): [id number]   " ORDERING CLINICIAN: SHUBHAM HURTADO   TECHNIQUE: TECHNIQUE DIVISION OF NUCLEAR MEDICINE POSITRON EMISSION TOMOGRAPHY (PET-CT)   The patient received an intravenous dose of 12.2 mCi of Fluorine-18 fluorodeoxyglucose (FDG). Positron emission tomographic (PET) images from skull base to the mid-thighs were then acquired after a one hour delay. Also acquired was a contemporaneous low dose non-contrast CT scan performed for attenuation correction of PET images and anatomic localization. The PET and CT images were digitally fused for display. All images were acquired on a combined PET-CT scanner unit. Some areas of FDG accumulation may be described in standardized uptake value (SUV) units.   CODING: Initial Treatment Strategy (PI)   CALIBRATION: Dose Injection-to-Scan Interval (mins): 75 min Mediastinal bloodpool SUV (normal 1.5-2.5): 2.2 Blood glucose: 159 mg/dL   FINDINGS: HEAD AND NECK: No evidence of focal hypermetabolic lesion in the brain parenchyma, noting that evaluation is limited because of the expected physiologic diffuse FDG uptake in the brain. No focal hypermetabolic soft tissue lesion is seen in the neck. No hypermetabolic cervical lymphadenopathy is present.   CHEST: Anterior right upper lobe nodule is seen, with mild FDG avidity (SUV max 1.4). No evidence of hypermetabolic mediastinal, hilar or axillary lymphadenopathy.   ABDOMEN AND PELVIS: No hypermetabolic soft tissue lesion is present in the abdomen and pelvis. No evidence of hypermetabolic lymphadenopathy. Physiologic radiotracer uptake is present in the liver and spleen with excretion into the bowel loops and the genitourinary tract.   MUSCULOSKELETAL/EXTREMITIES: No focal hypermetabolic lesion is seen in the axial or appendicular to suggest osseous metastasis. Mild FDG-avid soft tissue opacification in the anterior abdominal wall from prior laparotomy, likely postsurgical.       1. Anterior right upper lobe nodule is seen, with mild FDG avidity, for  which a slow growing neoplasm is not excluded. Follow-up CT chest is recommended. 2. No hypermetabolic rupesh or distant malignancy.   I personally reviewed the images/study and I agree with the findings as stated by Li Thomason MD. This study was interpreted at [place], [place].   MACRO: None   Signed by: Madi Bedoya 8/20/2024 1:05 PM Dictation workstation:   [id number]    ECG 12 lead (Clinic Performed)    Result Date: 8/13/2024  Sinus rhythm nonspecific ST and T changes    US thyroid    Result Date: 8/8/2024  Interpreted By:  Ramses Phillips, STUDY: US THYROID;  8/7/2024 4:08 pm   INDICATION: Signs/Symptoms:nodules.   COMPARISON: No prior ultrasound available.   ACCESSION NUMBER(S): [id number]   ORDERING CLINICIAN: SHUBHAM HURTADO   TECHNIQUE: Multiple ultrasonographic images of the thyroid gland and surrounding tissues were obtained.   FINDINGS: PARENCHYMA:  Mildly heterogenous background echogenicity.   SIZE: RIGHT LOBE: 4.2 x 1.7 x 1.7 cm LEFT LOBE: 5.3 x 3 x 2.8 cm ISTHMUS: 6 mm in AP diameter   NODULES: (Please note, assessment and description of nodules is per TI-RADS criteria. Up to 4 total nodules described, which includes largest and/or most clinically significant based on morphology.) It is noted that some spongiform and/or cystic nodules may not be specifically described and are TR category 1 (benign).   NODULE #: 1.   Location: Right lobe mid aspect Size: 1 x 0.8 x 1 cm Composition:  Solid or almost completely solid (2) Echogenicity:  Hyperechoic or isoechoic (1) Shape:  Wider-than-tall (0) Margin:  Ill-defined (0) Echogenic Foci:  None or Large comet-tail artifacts (0)   The total score of this nodule is 3 points, corresponding to a TI-RADS category  3; (3 points) Mildly suspicious.   NODULE #: 2.   Location: Right thyroid lobe inferior aspect Size: 2.2 x 1.8 x 1.1 cm Composition: Solid or almost completely solid (2) Echogenicity: Hyperechoic or  isoechoic (1) Shape:  Wider-than-tall (0) Margin:  Ill-defined (0) Echogenic Foci: None or Large comet-tail artifacts (0)   The total score of this nodule is 3 points, corresponding to a TI-RADS category 3; (3 points) Mildly suspicious.   NODULE #: 3.   Location: Left thyroid lobe mid aspect Size: 3.7 x 2.7 x 2.5 cm Composition: Solid or almost completely solid (2) Echogenicity: Hyperechoic or isoechoic (1) Shape:  Wider-than-tall (0) Margin:  Ill-defined (0) Echogenic Foci: None or Large comet-tail artifacts (0)   The total score of this nodule is 3 points, corresponding to a TI-RADS category 3; (3 points) Mildly suspicious.   NODULE #: 4.   Location: Left thyroid lobe inferior aspect Size: 2.6 x 1.7 x 2.4 cm Composition: Solid or almost completely solid (2) Echogenicity: Hyperechoic or isoechoic (1) Shape:  Wider-than-tall (0) Margin:  Ill-defined (0) Echogenic Foci: None or Large comet-tail artifacts (0)   The total score of this nodule is 3 points, corresponding to a TI-RADS category 3; (3 points) Mildly suspicious.         1. Enlarged mildly heterogeneous thyroid containing multiple varying sized TI-RADS 3 solid nodules with the largest in the left lobe measuring 3.7 cm and 2.6 cm in greatest diameter respectively.         Please note that these statements are based on the recommendations of the American College of Radiology   TI-RADS grading system. ACR TI-RADS recommendations (apply to nodules which have NOT been biopsied):   TR5 (?7 points) highly suspicious - FNA if ? 1cm, follow-up if 0.5 -0.9 cm every year for 5 years. Aggregate cancer risk 35%. TR4 (4-6 points) moderately suspicious - FNA if ? 1.5cm, follow-up if 1 -1.4 cm in 1, 2, 3 and 5 years. Aggregate cancer risk 9.1% TR3 (3 points) mildly suspicious - FNA if ? 2.5cm, follow-up if 1.5 -2.4 cm in 1, 3 and 5 years. Aggregate cancer risk 4.8% TR2 (2 points) not suspicious. Aggregate cancer risk 1.5% TR1 (0 points) benign - No FNA or follow-up.  Aggregate cancer risk 0.3%   MACRO: None.   Signed by: Ramses Phillips 8/8/2024 5:59 PM Dictation workstation:   [id number]    BI mammo bilateral diagnostic tomosynthesis    Result Date: 8/7/2024  Interpreted By:  Yosi Maldonado, STUDY: BI MAMMO BILATERAL DIAGNOSTIC TOMOSYNTHESIS; BI US BREAST LIMITED LEFT;  8/7/2024 8:58 am; 8/7/2024 9:19 am   ACCESSION NUMBER(S): [id number]; [id number]   ORDERING CLINICIAN: SHUBHAM HURTADO   INDICATION: Patient presents for annual exam and evaluation of a superior left breast mass seen on recent CT of the chest. History of left lumpectomy with radiation therapy.   COMPARISON: CT from 07/26/2024, mammograms from 06/04/2021, 06/03/2020, 09/27/2018   FINDINGS: MAMMOGRAPHY: 2D and tomosynthesis images were reviewed at 1 mm slice thickness.   Density:  There are areas of scattered fibroglandular tissue.   Focal asymmetry in the superior central left breast at anterior depth which appears stable when compared to multiple prior exams and likely corresponds to the mass seen on CT. Stable postsurgical changes in the upper outer left breast at posterior depth and left axilla. Stable left breast post radiation changes. No suspicious masses or calcifications are identified.   ULTRASOUND: Targeted ultrasound was performed of the left breast by a registered sonographer with elastography. No focal sonographic abnormalities visualized in the superior left breast. The tissue is soft on elastography. Previously-seen focal asymmetry likely represents stable fibroglandular tissue.       No mammographic or targeted sonographic evidence of malignancy. Stable left breast post treatment changes. Patient may resume annual screening.   BI-RADS CATEGORY: BI-RADS Category:  2 Benign. Recommendation:  Annual Screening. Recommended Date:  1 Year. Laterality:  Bilateral.   For any future breast imaging appointments, please call [phone]).     MACRO: None   Signed by: Yosi Maldonado 8/7/2024 3:36 PM  Dictation workstation:   [id number]    BI US breast limited left    Result Date: 8/7/2024  Interpreted By:  Yosi Maldonado, STUDY: BI MAMMO BILATERAL DIAGNOSTIC TOMOSYNTHESIS; BI US BREAST LIMITED LEFT;  8/7/2024 8:58 am; 8/7/2024 9:19 am   ACCESSION NUMBER(S): [id number]; [id number]   ORDERING CLINICIAN: SHUBHAM HURTADO   INDICATION: Patient presents for annual exam and evaluation of a superior left breast mass seen on recent CT of the chest. History of left lumpectomy with radiation therapy.   COMPARISON: CT from 07/26/2024, mammograms from 06/04/2021, 06/03/2020, 09/27/2018   FINDINGS: MAMMOGRAPHY: 2D and tomosynthesis images were reviewed at 1 mm slice thickness.   Density:  There are areas of scattered fibroglandular tissue.   Focal asymmetry in the superior central left breast at anterior depth which appears stable when compared to multiple prior exams and likely corresponds to the mass seen on CT. Stable postsurgical changes in the upper outer left breast at posterior depth and left axilla. Stable left breast post radiation changes. No suspicious masses or calcifications are identified.   ULTRASOUND: Targeted ultrasound was performed of the left breast by a registered sonographer with elastography. No focal sonographic abnormalities visualized in the superior left breast. The tissue is soft on elastography. Previously-seen focal asymmetry likely represents stable fibroglandular tissue.       No mammographic or targeted sonographic evidence of malignancy. Stable left breast post treatment changes. Patient may resume annual screening.   BI-RADS CATEGORY: BI-RADS Category:  2 Benign. Recommendation:  Annual Screening. Recommended Date:  1 Year. Laterality:  Bilateral.   For any future breast imaging appointments, please call [phone]).     MACRO: None   Signed by: Yosi Madlonado 8/7/2024 3:36 PM Dictation workstation:   [id number]    CT chest wo IV contrast    Result Date: 7/27/2024  Interpreted By:   Dallas Gold and Ebai Jerky STUDY: CT CHEST WO IV CONTRAST;  7/26/2024 8:18 am   INDICATION: Signs/Symptoms:lung nodule surveillance.   Per EMR: Patient with a history of smoking, left breast cancer status post lumpectomy and chemoradiation, acute on chronic diastolic heart failure presenting for follow-up of a lung nodule seen on CT MRI cardiac dated 03/07/2024..   COMPARISON: CT chest abdomen pelvis 08/03/2020.   ACCESSION NUMBER(S): [id number]   ORDERING CLINICIAN: PAYAL MCDONALD   TECHNIQUE: Contiguous axial images of the chest and upper abdomen were obtained without contrast. after the intravenous administration of iodinated contrast. Coronal and sagittal reformatted images were reconstructed from the axial data.   FINDINGS:     MEDIASTINUM AND LYMPH NODES: Heterogeneous enlarged appearance of the thyroid gland containing multiple hypodense thyroid nodules. The esophageal wall appears within normal limits.  No enlarged intrathoracic or axillary lymph nodes by imaging criteria. No pneumomediastinum.   VESSELS:  Normal caliber thoracic aorta . Mild aortic atherosclerosis.   HEART: Normal in size.  Mild coronary artery calcifications. No significant pericardial effusion.   LUNG, AIRWAYS, AND PLEURA: Subpleural reticular opacities within the anterior aspect of the left upper lobe, most compatible with post radiation changes. There are patchy ground-glass opacities scattered throughout the bilateral upper lobes and right middle lobe and to a lesser extent the bilateral lower lobes. No consolidation, pulmonary edema, pleural effusion or pneumothorax.   There is a 1.0 x 0.8 cm subpleural anterior right upper lobe pulmonary nodule (series 205, image 95) consistent with a pulmonary nodule seen on the MRI cardiac scoring from 03/07/2024. Comparison to the MRI study is limited due to the difference in imaging technique, however it appears to have increased in size where it measured 0.7 x 0.5 cm.   OSSEOUS STRUCTURES:  No acute osseous abnormality. Mild diffuse osseous demineralization. Visualized surgical changes within the T1 vertebral body   CHEST WALL SOFT TISSUES: Postsurgical changes of left lumpectomy. There is a 1.3 cm soft tissue nodule within the left breast.   UPPER ABDOMEN/OTHER:   Multiple partially visualized left renal cystic lesions, likely represent simple cysts. Additionally, there multiple subcentimeter left renal cyst which are hyperdense and are incompletely characterized in the basis of this examination, 1 of which is new from the CT abdomen pelvis from 08/03/2022 measuring 1.2 cm (series 202, image 284).       1. There is a 1.0 x 0.8 cm subpleural anterior right upper lobe pulmonary nodule, consistent with the nodule seen on MRI cardiac scoring dated 03/07/2024. Comparison to the MRI examination is limited due to the difference in imaging modality, however it appears to have increased in size. Further evaluation with a PET scan and/or tissue biopsy is recommended. No thoracic lymphadenopathy. 2. Scattered ground-glass opacities throughout bilateral lungs suspicious for multifocal infectious/inflammation. 3. Heterogeneous enlarged appearance of the thyroid gland with multiple hypodense nodule, recommend correlation with thyroid ultrasound for multinodular goiter. 4. Postsurgical changes of left lumpectomy. There is a 1.3 cm nodular soft tissue density within the left breast, recommend correlation with breast mammogram. 5. Multiple partially visualized simple left renal cysts and a new incompletely characterized hypodense lesion measuring 1.2 cm. Further evaluation with an MRI renal protocol is recommended.     I personally reviewed the images/study and I agree with the findings as stated by Resident Gemini Rob. This study was interpreted at [facility], [place], [place].     MACRO: None.   Signed by: Dallas Gold 7/27/2024 4:46 PM Dictation workstation:    "[id number]      Pulmonary Functions Testing Results:    No results found for: \"FEV1\", \"FVC\", \"IGA6KBY\", \"TLC\", \"DLCO\"    CT and PET personally reviewed     Assessment/Plan   Problem List Items Addressed This Visit             ICD-10-CM    Lung nodules R91.8    Relevant Orders    Complete Pulmonary Function Test Pre/Post Bronchodialator (Spirometry Pre/Post/DLCO/Lung Volumes)    Spirometry Pre/Post Bronchodilator    DLCO / Diffusion Capacity     Other Visit Diagnoses         Codes    Former smoker     Z87.891    History of breast cancer     Z85.3            Ms. Nunes is a pleasant 74 year old female h/o left breast cancer s/p lumpectomy with adjuvant chemo/RT. She presents for a slowly enlarging, mildly PET avid, right upper lobe 1.0 cm lung nodule. I d/w patient and her daughters the ddx of lung nodules including infectious/inflammatory or malignancy - including primary lung vs breast metastatic lesion. PET showed no other sites of disease. Discussed options for biopsy - patient would like to proceed with surgical wedge with potential for treatment. We will plan to obtain new PFTs (2022 numbers appear reasonable) and send her for PAT.        I spent 70 minutes in the professional and overall care of this patient.      Katya Bansal, DO  Thoracic & Esophageal Surgery     "

## 2024-08-22 ENCOUNTER — HOSPITAL ENCOUNTER (OUTPATIENT)
Dept: RESPIRATORY THERAPY | Facility: HOSPITAL | Age: 74
Discharge: HOME | End: 2024-08-22
Payer: COMMERCIAL

## 2024-08-22 DIAGNOSIS — R91.8 LUNG NODULES: ICD-10-CM

## 2024-08-22 PROCEDURE — 94729 DIFFUSING CAPACITY: CPT

## 2024-08-23 DIAGNOSIS — E87.6 HYPOKALEMIA: ICD-10-CM

## 2024-08-25 LAB
MGC ASCENT PFT - FEV1 - POST: 1.92
MGC ASCENT PFT - FEV1 - POST: 1.92
MGC ASCENT PFT - FEV1 - PRE: 2.09
MGC ASCENT PFT - FEV1 - PRE: 2.09
MGC ASCENT PFT - FEV1 - PREDICTED: 2.02
MGC ASCENT PFT - FEV1 - PREDICTED: 2.02
MGC ASCENT PFT - FVC - POST: 2.42
MGC ASCENT PFT - FVC - POST: 2.42
MGC ASCENT PFT - FVC - PRE: 2.59
MGC ASCENT PFT - FVC - PRE: 2.59
MGC ASCENT PFT - FVC - PREDICTED: 2.6
MGC ASCENT PFT - FVC - PREDICTED: 2.6

## 2024-08-26 ENCOUNTER — HOSPITAL ENCOUNTER (OUTPATIENT)
Dept: RADIOLOGY | Facility: HOSPITAL | Age: 74
Discharge: HOME | End: 2024-08-26
Payer: COMMERCIAL

## 2024-08-26 VITALS
OXYGEN SATURATION: 98 % | HEART RATE: 66 BPM | RESPIRATION RATE: 18 BRPM | TEMPERATURE: 97.6 F | DIASTOLIC BLOOD PRESSURE: 61 MMHG | SYSTOLIC BLOOD PRESSURE: 157 MMHG

## 2024-08-26 DIAGNOSIS — E07.9 THYROID MASS: ICD-10-CM

## 2024-08-26 PROCEDURE — 2500000005 HC RX 250 GENERAL PHARMACY W/O HCPCS

## 2024-08-26 PROCEDURE — 10005 FNA BX W/US GDN 1ST LES: CPT

## 2024-08-26 PROCEDURE — 88112 CYTOPATH CELL ENHANCE TECH: CPT | Mod: TC,59 | Performed by: INTERNAL MEDICINE

## 2024-08-26 PROCEDURE — 10006 FNA BX W/US GDN EA ADDL: CPT

## 2024-08-26 RX ORDER — POTASSIUM CHLORIDE 750 MG/1
10 TABLET, EXTENDED RELEASE ORAL DAILY
Qty: 90 TABLET | Refills: 3 | Status: SHIPPED | OUTPATIENT
Start: 2024-08-26

## 2024-08-26 RX ORDER — LIDOCAINE HYDROCHLORIDE 10 MG/ML
INJECTION, SOLUTION EPIDURAL; INFILTRATION; INTRACAUDAL; PERINEURAL
Status: COMPLETED | OUTPATIENT
Start: 2024-08-26 | End: 2024-08-26

## 2024-08-26 NOTE — POST-PROCEDURE NOTE
Interventional Radiology Brief Postprocedure Note    Attending: Carlin Ocasio CNP    Assistant: none    Diagnosis: left thyroid ndoules    Description of procedure: A Fine Needle Aspiration was preformed of the 3.7 cm and 2.6 cm nodule of the left  mid and inferior  lobe of the thyroid.        Anesthesia:  Local    Complications: None    Estimated Blood Loss: none    Medications (Filter: Administrations occurring from 1432 to 1536 on 08/26/24) As of 08/26/24 1536      lidocaine PF (Xylocaine) 10 mg/mL (1 %) injection (mL) Total volume:  10 mL      Date/Time Rate/Dose/Volume Action       08/26/24  1454 10 mL Given                   ID Type Source Tests Collected by Time   1 : Left Mid thyroid  FNA Non-Gynecologic Cytology THYROID FINE NEEDLE ASPIRATION LEFT MID LOBE CYTOLOGY CONSULTATION (NON-GYNECOLOGIC) JOEY Cadena 8/26/2024 1456   2 : Left inferior thyroid FNA Non-Gynecologic Cytology THYROID FINE NEEDLE ASPIRATION LEFT INFERIOR LOBE CYTOLOGY CONSULTATION (NON-GYNECOLOGIC) JOEY Cadena 8/26/2024 1503         See detailed result report with images in PACS.    The patient tolerated the procedure well without incident or complication and is in stable condition.

## 2024-08-27 ENCOUNTER — CLINICAL SUPPORT (OUTPATIENT)
Dept: PREADMISSION TESTING | Facility: HOSPITAL | Age: 74
End: 2024-08-27
Payer: COMMERCIAL

## 2024-08-27 DIAGNOSIS — R91.8 LUNG NODULES: ICD-10-CM

## 2024-08-27 LAB
LABORATORY COMMENT REPORT: NORMAL
LABORATORY COMMENT REPORT: NORMAL
PATH REPORT.FINAL DX SPEC: NORMAL
PATH REPORT.GROSS SPEC: NORMAL
PATH REPORT.TOTAL CANCER: NORMAL

## 2024-09-02 DIAGNOSIS — F41.9 ANXIETY: ICD-10-CM

## 2024-09-03 RX ORDER — BUSPIRONE HYDROCHLORIDE 5 MG/1
5 TABLET ORAL 2 TIMES DAILY PRN
Qty: 180 TABLET | Refills: 3 | Status: SHIPPED | OUTPATIENT
Start: 2024-09-03 | End: 2025-09-03

## 2024-09-04 ENCOUNTER — LAB (OUTPATIENT)
Dept: LAB | Facility: LAB | Age: 74
End: 2024-09-04
Payer: COMMERCIAL

## 2024-09-04 ENCOUNTER — DOCUMENTATION (OUTPATIENT)
Dept: PREADMISSION TESTING | Facility: HOSPITAL | Age: 74
End: 2024-09-04

## 2024-09-04 ENCOUNTER — PRE-ADMISSION TESTING (OUTPATIENT)
Dept: PREADMISSION TESTING | Facility: HOSPITAL | Age: 74
End: 2024-09-04
Payer: COMMERCIAL

## 2024-09-04 VITALS
OXYGEN SATURATION: 97 % | HEIGHT: 64 IN | DIASTOLIC BLOOD PRESSURE: 57 MMHG | TEMPERATURE: 97.2 F | BODY MASS INDEX: 29.06 KG/M2 | RESPIRATION RATE: 16 BRPM | SYSTOLIC BLOOD PRESSURE: 123 MMHG | WEIGHT: 170.19 LBS | HEART RATE: 67 BPM

## 2024-09-04 DIAGNOSIS — R91.8 LUNG NODULES: ICD-10-CM

## 2024-09-04 DIAGNOSIS — Z01.818 PREOP TESTING: Primary | ICD-10-CM

## 2024-09-04 DIAGNOSIS — R79.1 ABNORMAL COAGULATION PROFILE: ICD-10-CM

## 2024-09-04 LAB
ABO GROUP (TYPE) IN BLOOD: NORMAL
ALBUMIN SERPL BCP-MCNC: 3.7 G/DL (ref 3.4–5)
ALP SERPL-CCNC: 94 U/L (ref 33–136)
ALT SERPL W P-5'-P-CCNC: 16 U/L (ref 7–45)
ANION GAP SERPL CALC-SCNC: 19 MMOL/L (ref 10–20)
ANTIBODY SCREEN: NORMAL
APTT PPP: 32 SECONDS (ref 27–38)
AST SERPL W P-5'-P-CCNC: 16 U/L (ref 9–39)
BILIRUB SERPL-MCNC: 0.4 MG/DL (ref 0–1.2)
BUN SERPL-MCNC: 14 MG/DL (ref 6–23)
CALCIUM SERPL-MCNC: 8.5 MG/DL (ref 8.6–10.3)
CHLORIDE SERPL-SCNC: 102 MMOL/L (ref 98–107)
CO2 SERPL-SCNC: 24 MMOL/L (ref 21–32)
CREAT SERPL-MCNC: 1.34 MG/DL (ref 0.5–1.05)
EGFRCR SERPLBLD CKD-EPI 2021: 42 ML/MIN/1.73M*2
ERYTHROCYTE [DISTWIDTH] IN BLOOD BY AUTOMATED COUNT: 15 % (ref 11.5–14.5)
GLUCOSE SERPL-MCNC: 110 MG/DL (ref 74–99)
HCT VFR BLD AUTO: 43.1 % (ref 36–46)
HGB BLD-MCNC: 14 G/DL (ref 12–16)
INR PPP: 1 (ref 0.9–1.1)
MCH RBC QN AUTO: 29.4 PG (ref 26–34)
MCHC RBC AUTO-ENTMCNC: 32.5 G/DL (ref 32–36)
MCV RBC AUTO: 90 FL (ref 80–100)
NRBC BLD-RTO: 0 /100 WBCS (ref 0–0)
PLATELET # BLD AUTO: 359 X10*3/UL (ref 150–450)
POTASSIUM SERPL-SCNC: 3.5 MMOL/L (ref 3.5–5.3)
PROT SERPL-MCNC: 6.7 G/DL (ref 6.4–8.2)
PROTHROMBIN TIME: 11.7 SECONDS (ref 9.8–12.8)
RBC # BLD AUTO: 4.77 X10*6/UL (ref 4–5.2)
RH FACTOR (ANTIGEN D): NORMAL
SODIUM SERPL-SCNC: 141 MMOL/L (ref 136–145)
WBC # BLD AUTO: 13.3 X10*3/UL (ref 4.4–11.3)

## 2024-09-04 PROCEDURE — 36415 COLL VENOUS BLD VENIPUNCTURE: CPT

## 2024-09-04 PROCEDURE — 80053 COMPREHEN METABOLIC PANEL: CPT

## 2024-09-04 PROCEDURE — 85027 COMPLETE CBC AUTOMATED: CPT

## 2024-09-04 PROCEDURE — 86850 RBC ANTIBODY SCREEN: CPT

## 2024-09-04 PROCEDURE — 86901 BLOOD TYPING SEROLOGIC RH(D): CPT

## 2024-09-04 PROCEDURE — 99204 OFFICE O/P NEW MOD 45 MIN: CPT | Performed by: PHYSICIAN ASSISTANT

## 2024-09-04 PROCEDURE — 87081 CULTURE SCREEN ONLY: CPT | Mod: AHULAB | Performed by: PHYSICIAN ASSISTANT

## 2024-09-04 PROCEDURE — 86900 BLOOD TYPING SEROLOGIC ABO: CPT

## 2024-09-04 PROCEDURE — 85730 THROMBOPLASTIN TIME PARTIAL: CPT

## 2024-09-04 PROCEDURE — 85610 PROTHROMBIN TIME: CPT

## 2024-09-04 RX ORDER — CHLORHEXIDINE GLUCONATE ORAL RINSE 1.2 MG/ML
SOLUTION DENTAL
Qty: 473 ML | Refills: 0 | Status: SHIPPED | OUTPATIENT
Start: 2024-09-04

## 2024-09-04 ASSESSMENT — ENCOUNTER SYMPTOMS
HEMATOLOGIC/LYMPHATIC NEGATIVE: 1
GASTROINTESTINAL NEGATIVE: 1
MUSCULOSKELETAL NEGATIVE: 1
CONSTITUTIONAL NEGATIVE: 1
CARDIOVASCULAR NEGATIVE: 1
PSYCHIATRIC NEGATIVE: 1
EYES NEGATIVE: 1
ALLERGIC/IMMUNOLOGIC NEGATIVE: 1
RESPIRATORY NEGATIVE: 1
NEUROLOGICAL NEGATIVE: 1
ENDOCRINE NEGATIVE: 1

## 2024-09-04 NOTE — CPM/PAT NURSE NOTE
CPM/PAT Nurse Note      Name: Yessistone SILVINO Nunes (Serjio SILVINO Nunes)  /Age: 1950/74 y.o.       Past Medical History:   Diagnosis Date    Acute on chronic diastolic heart failure (Multi)     Acute on chronic diastolic heart failure (Multi)     Anemia     Anxiety     Asthma (HHS-HCC)     Bipolar disorder (Multi)     Breast cancer (Multi)     left breast cancer s/p lumpectomy; chemotherapy and radiation    Cannabis abuse     Cardiology follow-up encounter 2024    Brody MOON MD    Chronic venous insufficiency     status post vein ablation    COPD (chronic obstructive pulmonary disease) (Multi)     Coronary artery disease     , [facility] with non-obstructive CAD on 16    WITT (dyspnea on exertion)     DVT (deep venous thrombosis) (Multi) 1972    right leg, provoked w/ pregnancy    Fibromyalgia     GERD (gastroesophageal reflux disease)     Gout     H/O echocardiogram 2023    preserved ejection fraction with impaired relaxation and moderate aortic regurgitation on echo performed    H/O MRI of heart 2024    moderate aortic regurgitation with regurgitant fraction of 30%, regurgitant volume of 20%, normal LVEF of 68%, RVEF of 60%, no infiltrative or scarring, lung nodule on cardiac MRI performed    History of nuclear stress test 2024    no clear ischemia nuclear pharmacologic stress test performed    Hx antineoplastic chemo     Hyperlipidemia     Hypertension     Insomnia     Lumbar radiculopathy     Lung nodule     Plan: Thoracoscopy; Right Lung Wedge Resection 24    Nicotine dependence     Nonrheumatic aortic (valve) insufficiency     Osteoarthritis     Overweight     RLS (restless legs syndrome)     Sleep apnea     TIA (transient ischemic attack)     no residual    Type 2 diabetes mellitus (Multi)     Vitamin D deficiency        Past Surgical History:   Procedure Laterality Date    BACK SURGERY      BREAST BIOPSY      BREAST LUMPECTOMY      CARDIAC CATHETERIZATION  2016     FOOT SURGERY      HERNIA REPAIR      HYSTERECTOMY      NECK SURGERY      SHOULDER SURGERY      SOFT TISSUE BIOPSY  08/26/2024    Thyroid    VENOUS ABLATION         Patient Sexual activity questions deferred to the physician.    Family History   Problem Relation Name Age of Onset    Cancer Mother          COLORECTAL    Aneurysm Mother      Stroke Mother      Arthritis Father      Hypertension Father      Aneurysm Father         Allergies   Allergen Reactions    Iodinated Contrast Media Anaphylaxis    Strawberry Angioedema    Iodine Swelling    Latex Rash       Prior to Admission medications    Medication Sig Start Date End Date Taking? Authorizing Provider   albuterol 90 mcg/actuation inhaler Inhale 1 puff 3 times a day.  Patient not taking: Reported on 9/4/2024 10/30/23   Raymond Carnes MD   amLODIPine (Norvasc) 10 mg tablet TAKE 1 TABLET BY MOUTH EVERY DAY 5/13/24   Raymond Carnes MD   aspirin 81 mg EC tablet Take 1 tablet (81 mg) by mouth once daily. 11/19/19   Historical Provider, MD   busPIRone (Buspar) 5 mg tablet TAKE 1 TABLET (5 MG) BY MOUTH 2 TIMES A DAY AS NEEDED (WHEN FEELING ANXIOUS). 9/3/24 9/3/25  REGINO Pérez-CNP   carvedilol (Coreg) 25 mg tablet TAKE 1 TABLET BY MOUTH TWICE A DAY 4/19/24   Raymond Carnes MD   cholecalciferol (Vitamin D-3) 50 MCG (2000 UT) tablet Take 1 tablet (2,000 Units) by mouth once daily.    Historical Provider, MD   clotrimazole-betamethasone (Lotrisone) cream APPLY THIN COAT TO AFFECTED AREA TWICE A DAY IN THE MORNING AND IN THE EVENING  Patient not taking: Reported on 9/4/2024 12/12/23   Raymond Carnes MD   colchicine, gout, 0.6 mg tablet TAKE 1 TABLET BY MOUTH EVERY DAY  Patient taking differently: Take by mouth once daily. 9/1/23   Raymond Carnes MD   fluocinonide 0.05 % cream Apply topically 2 times a day. APPLY SPARINGLY TO AFFECTED AREA 9/1/23   Historical Provider, MD   fluticasone (Flonase) 50 mcg/actuation nasal spray Administer 1 spray into each  nostril once daily. 7/20/20   Historical Provider, MD   fluticasone propion-salmeteroL (Advair Diskus) 250-50 mcg/dose diskus inhaler Inhale 1 puff 2 times a day.    Historical Provider, MD   FreeStyle Jaycob 14 Day Sensor kit APPLY SENSOR TO BACK UPPER ARM REMOVE AND REPLACE EVERY 14 DAYS USE WITH DEVICE 12/29/23   Raymond Carnes MD   furosemide (Lasix) 40 mg tablet Take 0.5 tablets (20 mg) by mouth once daily. 8/13/24   Raymond Carnes MD   guaiFENesin (Mucinex) 600 mg 12 hr tablet Take 2 tablets (1,200 mg) by mouth 2 times a day. Do not crush, chew, or split.  Patient not taking: Reported on 9/4/2024 10/30/23 10/29/24  Raymond Carnes MD   ipratropium-albuteroL (Duo-Neb) 0.5-2.5 mg/3 mL nebulizer solution TAKE 3 ML BY NEBULIZATION 3 TIMES A DAY  Patient not taking: Reported on 9/4/2024 5/28/24   Raymond Carnes MD   Klor-Con M10 10 mEq ER tablet TAKE 1 TABLET BY MOUTH EVERY DAY 8/26/24   Raymond Carnes MD   losartan (Cozaar) 100 mg tablet Take 0.5 tablets (50 mg) by mouth once daily. 8/13/24   Raymond Carnes MD   miconazole (Micotin) 2 % cream miconazole nitrate 2 % topical cream   APPLY SPARINGLY TO AFFECTED AREA TWICE A DAY 1/30/23   Historical Provider, MD   nitroglycerin (Nitrostat) 0.4 mg SL tablet Place 1 tablet (0.4 mg) under the tongue every 5 minutes if needed for chest pain.  Patient not taking: Reported on 9/4/2024 3/19/24   Brody MOON MD   omeprazole OTC (PriLOSEC OTC) 20 mg EC tablet Take 1 tablet (20 mg) by mouth once daily. Do not crush, chew, or split. 8/13/24 8/13/25  Raymond Carnes MD   tiZANidine (Zanaflex) 4 mg tablet TAKE 1 TABLET (4 MG) BY MOUTH EVERY 6 HOURS IF NEEDED FOR MUSCLE SPASMS 7/9/24 9/7/24  Raymond Carnes MD   Tradjenta 5 mg tablet TAKE 1 TABLET BY MOUTH EVERY DAY AS DIRECTED 3/25/24   Raymond Carnes MD   busPIRone (Buspar) 5 mg tablet TAKE 1 TABLET (5 MG) BY MOUTH 2 TIMES A DAY AS NEEDED (WHEN FEELING ANXIOUS).  Patient taking differently: Take 1 tablet (5 mg) by mouth  2 times a day. 10/16/23 9/3/24  Raymond Carnes MD        PAT ROS     DASI Risk Score    No data to display       Caprini DVT Assessment    No data to display       Modified Frailty Index    No data to display       CHADS2 Stroke Risk  Current as of today        N/A 3 to 100%: High Risk   2 to < 3%: Medium Risk   0 to < 2%: Low Risk     Last Change: N/A          This score determines the patient's risk of having a stroke if the patient has atrial fibrillation.        This score is not applicable to this patient. Components are not calculated.          Revised Cardiac Risk Index    No data to display       Apfel Simplified Score    No data to display       Risk Analysis Index Results This Encounter    No data found in the last 1 encounters.         Nurse Plan of Action:       After Visit Summary (AVS) reviewed and patient verbalized good understanding of medications and NPO instructions.

## 2024-09-04 NOTE — PREPROCEDURE INSTRUCTIONS
Medication List            Accurate as of September 4, 2024  2:48 PM. Always use your most recent med list.                albuterol 90 mcg/actuation inhaler  Inhale 1 puff 3 times a day.  Medication Adjustments for Surgery: Take/Use as prescribed     amLODIPine 10 mg tablet  Commonly known as: Norvasc  TAKE 1 TABLET BY MOUTH EVERY DAY  Medication Adjustments for Surgery: Take on the morning of surgery     aspirin 81 mg EC tablet  Medication Adjustments for Surgery: Take on the morning of surgery     busPIRone 5 mg tablet  Commonly known as: Buspar  TAKE 1 TABLET (5 MG) BY MOUTH 2 TIMES A DAY AS NEEDED (WHEN FEELING ANXIOUS).  Medication Adjustments for Surgery: Take/Use as prescribed     carvedilol 25 mg tablet  Commonly known as: Coreg  TAKE 1 TABLET BY MOUTH TWICE A DAY  Medication Adjustments for Surgery: Take on the morning of surgery     cholecalciferol 50 MCG (2000 UT) tablet  Commonly known as: Vitamin D-3  Medication Adjustments for Surgery: Do Not take on the morning of surgery     clotrimazole-betamethasone cream  Commonly known as: Lotrisone  APPLY THIN COAT TO AFFECTED AREA TWICE A DAY IN THE MORNING AND IN THE EVENING  Notes to patient: HOLD morning of surgery     colchicine 0.6 mg tablet  TAKE 1 TABLET BY MOUTH EVERY DAY  Medication Adjustments for Surgery: Take on the morning of surgery     fluocinonide 0.05 % cream  Commonly known as: Lidex  Notes to patient: HOLD morning of surgery     fluticasone 50 mcg/actuation nasal spray  Commonly known as: Flonase  Notes to patient: Use morning of surgery     fluticasone propion-salmeteroL 250-50 mcg/dose diskus inhaler  Commonly known as: Advair Diskus  Notes to patient: Use morning of surgery     FreeStyle Jaycob 14 Day Sensor kit  Generic drug: flash glucose sensor kit  APPLY SENSOR TO BACK UPPER ARM REMOVE AND REPLACE EVERY 14 DAYS USE WITH DEVICE     furosemide 40 mg tablet  Commonly known as: Lasix  Take 0.5 tablets (20 mg) by mouth once  daily.  Medication Adjustments for Surgery: Take on the morning of surgery     guaiFENesin 600 mg 12 hr tablet  Commonly known as: Mucinex  Take 2 tablets (1,200 mg) by mouth 2 times a day. Do not crush, chew, or split.  Medication Adjustments for Surgery: Take on the morning of surgery     ipratropium-albuteroL 0.5-2.5 mg/3 mL nebulizer solution  Commonly known as: Duo-Neb  TAKE 3 ML BY NEBULIZATION 3 TIMES A DAY  Notes to patient: Use morning of surgery     Klor-Con M10 10 mEq ER tablet  Generic drug: potassium chloride CR  TAKE 1 TABLET BY MOUTH EVERY DAY  Medication Adjustments for Surgery: Do Not take on the morning of surgery     losartan 100 mg tablet  Commonly known as: Cozaar  Take 0.5 tablets (50 mg) by mouth once daily.  Medication Adjustments for Surgery: Take last dose 1 day (24 hours) before surgery     miconazole 2 % cream  Commonly known as: Micotin  Notes to patient: HOLD morning of surgery     nitroglycerin 0.4 mg SL tablet  Commonly known as: Nitrostat  Place 1 tablet (0.4 mg) under the tongue every 5 minutes if needed for chest pain.  Notes to patient: Use if needed morning of surgery     omeprazole OTC 20 mg EC tablet  Commonly known as: PriLOSEC OTC  Take 1 tablet (20 mg) by mouth once daily. Do not crush, chew, or split.  Medication Adjustments for Surgery: Take on the morning of surgery     tiZANidine 4 mg tablet  Commonly known as: Zanaflex  TAKE 1 TABLET (4 MG) BY MOUTH EVERY 6 HOURS IF NEEDED FOR MUSCLE SPASMS  Notes to patient: Use if needed morning of surgery     Tradjenta 5 mg tablet  Generic drug: linaGLIPtin  TAKE 1 TABLET BY MOUTH EVERY DAY AS DIRECTED  Notes to patient: HOLD night prior and morning of surgery                 Concerning above medication instructions- If medication is normally taken at night continue normal schedule - do not take night prior and morning of surgery.     CONTACT SURGEON'S OFFICE IF YOU DEVELOP:  * Fever = 100.4 F   * New respiratory symptoms (e.g.  cough, shortness of breath, respiratory distress, sore throat)  * Recent loss of taste or smell  *Flu like symptoms such as headache, fatigue or gastrointestinal symptoms  * You develop any open sores, shingles, burning or painful urination   AND/OR:  * You no longer wish to have the surgery.  * Any other personal circumstances change that may lead to the need to cancel or defer this surgery.  *You were admitted to any hospital within one week of your planned procedure.    SMOKING:  *Quitting smoking can make a huge difference to your health and recovery from surgery.    *If you need help with quitting, call 0-034-QUIT-NOW.    DAY BEFORE SURGERY:  Nothing by mouth (solid or liquid) after midnight the night before your surgery/procedure.           If DIABETIC - Please check fasting blood sugar upon waking up.  If fasting sugar is <80 mg/dl, please drink 100ml/3oz of apple juice no later than 2 hours prior to arrival time to surgery.      SURGICAL TIME  *You will be contacted between 2 p.m. and 6 p.m. the business day before your surgery with your arrival time.  *If you haven't received a call by 6pm, call [phone].  *Scheduled surgery times may change and you will be notified if this occurs-check your personal voicemail for any updates.    ON THE MORNING OF SURGERY:  *Wear comfortable, loose fitting clothing.   *Do not use moisturizers, creams, lotions or perfume.  *All jewelry and valuables should be left at home.  *Prosthetic devices such as contact lenses, hearing aids, dentures, eyelash extensions, hairpins and body piercing must be removed before surgery.    BRING WITH YOU:  *Photo ID and insurance card  *Current list of medicines and allergies  *Pacemaker/Defibrillator/Heart stent cards  *CPAP machine and mask  *Slings/splints/crutches  *Copy of your complete Advanced Directive/DHPOA-if applicable  *Neurostimulator implant remote    PARKING AND ARRIVAL:  *Check in at the Main Entrance desk and let them know  you are here for surgery.  *You will be directed to the 2nd floor surgical waiting area.    AFTER OUTPATIENT SURGERY:  *A responsible adult MUST accompany you at the time of discharge and stay with you for 24 hours after your surgery.  *You may NOT drive yourself home after surgery.  *You may use a taxi or ride sharing service ([employer], Uber) to return home ONLY if you are accompanied by a friend or family member.  *Instructions for resuming your medications will be provided by your surgeon.         Home Preoperative Antibacterial Shower     What is a home preoperative antibacterial shower?  This shower is a way of cleaning the skin with a germ killing soap before surgery.  The soap contains chlorhexidine, commonly known as CHG.  CHG is a soap for your skin with germ killing ability.  Let your doctor know if you are allergic to chlorhexidine.    Why do I need to take a preoperative antibacterial shower?  Skin is not sterile.  It is best to try to make your skin as free of germs as possible before surgery.  Proper cleansing with a germ killing soap before surgery can lower the number of germs on your skin.  This helps to reduce the risk of infection at the surgical site.  Following the instructions listed below will help you prepare your skin for surgery.      How do I use the CHG skin cleanser?  Steps:  Begin using your CHG soap five days before your scheduled surgery on ________________________.    Days 1-4 Shower before bed:  Wash your face and genitals with your normal soap and rinse.    Wash and rinse your hair using the CHG soap. Rinse completely, do not condition your   Hair.          3.    Apply the CHG soap to a clean wet washcloth.  Turn the water off or move away                From the water spray to avoid premature rinsing of the CHG soap as you are applying.     4.   Lather your entire body from the neck down.  Do not use on your face or genitals.   Pay special attention to the area(s) where your  incision(s) will be located unless they are on your face.  Avoid scrubbing your skin too hard.  The important point is to have the CHG soap sit on your skin for 3 minutes.    When the 3 minutes are up, turn on the water and rinse the CHG soap off your body completely.   Pat yourself dry with a clean, freshly-laundered towel.  Dress in clean, freshly laundered night clothes.    Be sure to sleep with clean, freshly laundered sheets.  Day 5:  Last shower is the morning before surgery: Follow above Instructions.    NOTE:    *Hair extensions should be removed    *Keep CHG soap out of eyes and ear canals   *DO NOT wash with regular soap on your body after you have used the CHG        soap solution  *DO NOT apply powders, lotions, or perfume.  *Deodorant may be used days 1-4, BUT NOT the day of surgery    Who should I contact if I have any questions regarding the use of CHG soap?  Call the [facility], Preadmission Testing at [phone] if you have any questions.              Patient Information: Pre-Operative Infection Prevention Measures     Why did I have my nose, under my arms and groin swabbed?  The purpose of the swab is to identify Staphylococcus aureus inside your nose or on your skin.  The swab was sent to the laboratory for culture.  A positive swab/culture for Staphylococcus aureus is called colonization or carriage.      What is Staphylococcus aureus?  Staphylococcus aureus, also known as “staph”, is a germ found on the skin or in the nose of healthy people.  Sometimes Staphylococcus aureus can get into the body and cause an infection.  This can be minor (such as pimples, boils or other skin problems).  It might also be serious (such as blood infection, pneumonia or a surgical site infection).    What is Staphylococcus aureus colonization or carriage?  Colonization or carriage means that a person has the germ but is not sick from it.  These bacteria can be spread on the hands or  when breathing or sneezing.    How is Staphylococcus aureus spread?  It is most often spread by close contact with a person or item that carries it.    What happens if my culture is positive for Staphylococcus aureus?  Your doctor/medical team will use this information to guide any antibiotic treatment which may be necessary.  Regardless of the culture results, we will clean the inside of your nose with a betadine swab just before you have your surgery.      Will I get an infection if I have Staphylococcus aureus in my nose or on my skin?  Anyone can get an infection with Staphylococcus aureus.  However, the best way to reduce your risk of infection is to follow the instructions provided to you for the use of your CHG soap and dental rinse.        Who should I contact if I have any questions?  Call the [facility], Preadmission Testing at [phone] if you have any questions.           Patient Information: Oral/Dental Rinse  **This is a prescription; pick it up at your preferred local pharmacy **  What is oral/dental rinse?   It is a mouthwash. It is a way of cleaning the mouth with a germ killing solution before your surgery.  The solution contains chlorhexidine, commonly known as CHG.   It is used inside the mouth to kill a bacteria known as Staphylococcus aureus.  Let your doctor know if you are allergic to Chlorhexidine.    Why do I need to use CHG oral/dental rinse?  The CHG oral/dental rinse helps to kill a bacteria in your mouth known a Staphylococcus aureus.     This reduces the risk of infection at the surgical site.      Using your CHG oral/dental rinse  STEPS:  Use your CHG oral/dental rinse after you brush your teeth the night before (at bedtime) and the morning of your surgery.  Follow all directions on your prescription label.    Use the cap on the container to measure 15ml (fill cap to fill line)  Swish (gargle if you can) the mouthwash in your mouth for at least 30  seconds, (do not to swallow) spit out  After you use your CHG rinse, do not rinse your mouth with water, drink or eat.  Please refer to prescription label for the appropriate time to resume oral intake  Dental rinse comes in one size bottle: 473ml ~16oz.  You will have leftover    rinse, discard after this use.    What side effects might I have using the CHG oral/dental rinse?  CHG rinse will stick to plaque on the teeth.  Brush and floss just before use.  Teeth brushing will help avoid staining of plaque during use.    Who should I contact if I have questions about the CHG oral/dental rinse?  Please call [facility], Preadmission Testing at [phone] if you have any questions

## 2024-09-06 LAB — STAPHYLOCOCCUS SPEC CULT: NORMAL

## 2024-09-11 ENCOUNTER — ANESTHESIA EVENT (OUTPATIENT)
Dept: OPERATING ROOM | Facility: HOSPITAL | Age: 74
End: 2024-09-11
Payer: COMMERCIAL

## 2024-09-12 ENCOUNTER — HOSPITAL ENCOUNTER (INPATIENT)
Facility: HOSPITAL | Age: 74
LOS: 1 days | Discharge: HOME | End: 2024-09-13
Attending: STUDENT IN AN ORGANIZED HEALTH CARE EDUCATION/TRAINING PROGRAM | Admitting: STUDENT IN AN ORGANIZED HEALTH CARE EDUCATION/TRAINING PROGRAM
Payer: COMMERCIAL

## 2024-09-12 ENCOUNTER — ANESTHESIA (OUTPATIENT)
Dept: OPERATING ROOM | Facility: HOSPITAL | Age: 74
End: 2024-09-12
Payer: COMMERCIAL

## 2024-09-12 ENCOUNTER — APPOINTMENT (OUTPATIENT)
Dept: RADIOLOGY | Facility: HOSPITAL | Age: 74
End: 2024-09-12
Payer: COMMERCIAL

## 2024-09-12 DIAGNOSIS — Z98.890 S/P THORACOTOMY: ICD-10-CM

## 2024-09-12 DIAGNOSIS — R91.8 LUNG NODULES: Primary | ICD-10-CM

## 2024-09-12 DIAGNOSIS — Z85.3 HISTORY OF BREAST CANCER: ICD-10-CM

## 2024-09-12 LAB
ABO GROUP (TYPE) IN BLOOD: NORMAL
ANTIBODY SCREEN: NORMAL
GLUCOSE BLD MANUAL STRIP-MCNC: 161 MG/DL (ref 74–99)
GLUCOSE BLD MANUAL STRIP-MCNC: 171 MG/DL (ref 74–99)
RH FACTOR (ANTIGEN D): NORMAL

## 2024-09-12 PROCEDURE — 2500000005 HC RX 250 GENERAL PHARMACY W/O HCPCS: Performed by: STUDENT IN AN ORGANIZED HEALTH CARE EDUCATION/TRAINING PROGRAM

## 2024-09-12 PROCEDURE — 3600000004 HC OR TIME - INITIAL BASE CHARGE - PROCEDURE LEVEL FOUR: Performed by: STUDENT IN AN ORGANIZED HEALTH CARE EDUCATION/TRAINING PROGRAM

## 2024-09-12 PROCEDURE — 2500000004 HC RX 250 GENERAL PHARMACY W/ HCPCS (ALT 636 FOR OP/ED): Performed by: STUDENT IN AN ORGANIZED HEALTH CARE EDUCATION/TRAINING PROGRAM

## 2024-09-12 PROCEDURE — 86901 BLOOD TYPING SEROLOGIC RH(D): CPT | Performed by: STUDENT IN AN ORGANIZED HEALTH CARE EDUCATION/TRAINING PROGRAM

## 2024-09-12 PROCEDURE — 7100000001 HC RECOVERY ROOM TIME - INITIAL BASE CHARGE: Performed by: STUDENT IN AN ORGANIZED HEALTH CARE EDUCATION/TRAINING PROGRAM

## 2024-09-12 PROCEDURE — 32666 THORACOSCOPY W/WEDGE RESECT: CPT | Performed by: STUDENT IN AN ORGANIZED HEALTH CARE EDUCATION/TRAINING PROGRAM

## 2024-09-12 PROCEDURE — 2060000001 HC INTERMEDIATE ICU ROOM DAILY

## 2024-09-12 PROCEDURE — 2500000002 HC RX 250 W HCPCS SELF ADMINISTERED DRUGS (ALT 637 FOR MEDICARE OP, ALT 636 FOR OP/ED): Performed by: STUDENT IN AN ORGANIZED HEALTH CARE EDUCATION/TRAINING PROGRAM

## 2024-09-12 PROCEDURE — 71045 X-RAY EXAM CHEST 1 VIEW: CPT

## 2024-09-12 PROCEDURE — 71045 X-RAY EXAM CHEST 1 VIEW: CPT | Performed by: RADIOLOGY

## 2024-09-12 PROCEDURE — 82947 ASSAY GLUCOSE BLOOD QUANT: CPT

## 2024-09-12 PROCEDURE — 36415 COLL VENOUS BLD VENIPUNCTURE: CPT | Performed by: STUDENT IN AN ORGANIZED HEALTH CARE EDUCATION/TRAINING PROGRAM

## 2024-09-12 PROCEDURE — 3600000009 HC OR TIME - EACH INCREMENTAL 1 MINUTE - PROCEDURE LEVEL FOUR: Performed by: STUDENT IN AN ORGANIZED HEALTH CARE EDUCATION/TRAINING PROGRAM

## 2024-09-12 PROCEDURE — 94640 AIRWAY INHALATION TREATMENT: CPT

## 2024-09-12 PROCEDURE — 3700000002 HC GENERAL ANESTHESIA TIME - EACH INCREMENTAL 1 MINUTE: Performed by: STUDENT IN AN ORGANIZED HEALTH CARE EDUCATION/TRAINING PROGRAM

## 2024-09-12 PROCEDURE — 2500000001 HC RX 250 WO HCPCS SELF ADMINISTERED DRUGS (ALT 637 FOR MEDICARE OP): Performed by: NURSE PRACTITIONER

## 2024-09-12 PROCEDURE — 3700000001 HC GENERAL ANESTHESIA TIME - INITIAL BASE CHARGE: Performed by: STUDENT IN AN ORGANIZED HEALTH CARE EDUCATION/TRAINING PROGRAM

## 2024-09-12 PROCEDURE — 2720000007 HC OR 272 NO HCPCS: Performed by: STUDENT IN AN ORGANIZED HEALTH CARE EDUCATION/TRAINING PROGRAM

## 2024-09-12 PROCEDURE — 2500000004 HC RX 250 GENERAL PHARMACY W/ HCPCS (ALT 636 FOR OP/ED): Performed by: NURSE PRACTITIONER

## 2024-09-12 PROCEDURE — 0BBC4ZZ EXCISION OF RIGHT UPPER LUNG LOBE, PERCUTANEOUS ENDOSCOPIC APPROACH: ICD-10-PCS | Performed by: STUDENT IN AN ORGANIZED HEALTH CARE EDUCATION/TRAINING PROGRAM

## 2024-09-12 PROCEDURE — 2500000005 HC RX 250 GENERAL PHARMACY W/O HCPCS: Performed by: ANESTHESIOLOGIST ASSISTANT

## 2024-09-12 PROCEDURE — 7100000002 HC RECOVERY ROOM TIME - EACH INCREMENTAL 1 MINUTE: Performed by: STUDENT IN AN ORGANIZED HEALTH CARE EDUCATION/TRAINING PROGRAM

## 2024-09-12 PROCEDURE — 2500000004 HC RX 250 GENERAL PHARMACY W/ HCPCS (ALT 636 FOR OP/ED): Performed by: ANESTHESIOLOGIST ASSISTANT

## 2024-09-12 RX ORDER — ROCURONIUM BROMIDE 10 MG/ML
INJECTION, SOLUTION INTRAVENOUS AS NEEDED
Status: DISCONTINUED | OUTPATIENT
Start: 2024-09-12 | End: 2024-09-12

## 2024-09-12 RX ORDER — BUDESONIDE 0.5 MG/2ML
0.5 INHALANT ORAL
Status: DISCONTINUED | OUTPATIENT
Start: 2024-09-12 | End: 2024-09-13 | Stop reason: HOSPADM

## 2024-09-12 RX ORDER — POLYETHYLENE GLYCOL 3350 17 G/17G
17 POWDER, FOR SOLUTION ORAL DAILY
Status: DISCONTINUED | OUTPATIENT
Start: 2024-09-12 | End: 2024-09-13 | Stop reason: HOSPADM

## 2024-09-12 RX ORDER — DIPHENHYDRAMINE HYDROCHLORIDE 50 MG/ML
25 INJECTION INTRAMUSCULAR; INTRAVENOUS EVERY 6 HOURS PRN
Status: DISCONTINUED | OUTPATIENT
Start: 2024-09-12 | End: 2024-09-13 | Stop reason: HOSPADM

## 2024-09-12 RX ORDER — CEFAZOLIN 1 G/1
INJECTION, POWDER, FOR SOLUTION INTRAVENOUS AS NEEDED
Status: DISCONTINUED | OUTPATIENT
Start: 2024-09-12 | End: 2024-09-12

## 2024-09-12 RX ORDER — DIPHENHYDRAMINE HYDROCHLORIDE 50 MG/ML
12.5 INJECTION INTRAMUSCULAR; INTRAVENOUS ONCE AS NEEDED
Status: DISCONTINUED | OUTPATIENT
Start: 2024-09-12 | End: 2024-09-12 | Stop reason: HOSPADM

## 2024-09-12 RX ORDER — COLCHICINE 0.6 MG/1
0.6 TABLET ORAL DAILY
Status: DISCONTINUED | OUTPATIENT
Start: 2024-09-12 | End: 2024-09-13 | Stop reason: HOSPADM

## 2024-09-12 RX ORDER — LABETALOL HYDROCHLORIDE 5 MG/ML
5 INJECTION, SOLUTION INTRAVENOUS ONCE AS NEEDED
Status: DISCONTINUED | OUTPATIENT
Start: 2024-09-12 | End: 2024-09-12 | Stop reason: HOSPADM

## 2024-09-12 RX ORDER — ONDANSETRON 4 MG/1
4 TABLET, FILM COATED ORAL EVERY 8 HOURS PRN
Status: DISCONTINUED | OUTPATIENT
Start: 2024-09-12 | End: 2024-09-13 | Stop reason: HOSPADM

## 2024-09-12 RX ORDER — LOSARTAN POTASSIUM 50 MG/1
100 TABLET ORAL DAILY
Status: DISCONTINUED | OUTPATIENT
Start: 2024-09-12 | End: 2024-09-13 | Stop reason: HOSPADM

## 2024-09-12 RX ORDER — NALOXONE HYDROCHLORIDE 0.4 MG/ML
0.2 INJECTION, SOLUTION INTRAMUSCULAR; INTRAVENOUS; SUBCUTANEOUS EVERY 5 MIN PRN
Status: DISCONTINUED | OUTPATIENT
Start: 2024-09-12 | End: 2024-09-13 | Stop reason: HOSPADM

## 2024-09-12 RX ORDER — ONDANSETRON HYDROCHLORIDE 2 MG/ML
INJECTION, SOLUTION INTRAVENOUS AS NEEDED
Status: DISCONTINUED | OUTPATIENT
Start: 2024-09-12 | End: 2024-09-12

## 2024-09-12 RX ORDER — FUROSEMIDE 40 MG/1
40 TABLET ORAL DAILY
Status: DISCONTINUED | OUTPATIENT
Start: 2024-09-12 | End: 2024-09-13 | Stop reason: HOSPADM

## 2024-09-12 RX ORDER — AMLODIPINE BESYLATE 10 MG/1
10 TABLET ORAL DAILY
Status: DISCONTINUED | OUTPATIENT
Start: 2024-09-12 | End: 2024-09-13 | Stop reason: HOSPADM

## 2024-09-12 RX ORDER — ACETAMINOPHEN 325 MG/1
650 TABLET ORAL EVERY 6 HOURS
Status: DISCONTINUED | OUTPATIENT
Start: 2024-09-12 | End: 2024-09-13 | Stop reason: HOSPADM

## 2024-09-12 RX ORDER — LOSARTAN POTASSIUM 50 MG/1
50 TABLET ORAL DAILY
Status: DISCONTINUED | OUTPATIENT
Start: 2024-09-12 | End: 2024-09-12

## 2024-09-12 RX ORDER — SODIUM CHLORIDE, SODIUM LACTATE, POTASSIUM CHLORIDE, CALCIUM CHLORIDE 600; 310; 30; 20 MG/100ML; MG/100ML; MG/100ML; MG/100ML
100 INJECTION, SOLUTION INTRAVENOUS CONTINUOUS
Status: DISCONTINUED | OUTPATIENT
Start: 2024-09-12 | End: 2024-09-12 | Stop reason: HOSPADM

## 2024-09-12 RX ORDER — PROPOFOL 10 MG/ML
INJECTION, EMULSION INTRAVENOUS AS NEEDED
Status: DISCONTINUED | OUTPATIENT
Start: 2024-09-12 | End: 2024-09-12

## 2024-09-12 RX ORDER — OXYCODONE HYDROCHLORIDE 5 MG/1
5 TABLET ORAL EVERY 4 HOURS PRN
Status: DISCONTINUED | OUTPATIENT
Start: 2024-09-12 | End: 2024-09-13 | Stop reason: HOSPADM

## 2024-09-12 RX ORDER — SODIUM CHLORIDE, SODIUM LACTATE, POTASSIUM CHLORIDE, CALCIUM CHLORIDE 600; 310; 30; 20 MG/100ML; MG/100ML; MG/100ML; MG/100ML
20 INJECTION, SOLUTION INTRAVENOUS CONTINUOUS
Status: DISCONTINUED | OUTPATIENT
Start: 2024-09-12 | End: 2024-09-13

## 2024-09-12 RX ORDER — FLUTICASONE FUROATE AND VILANTEROL 200; 25 UG/1; UG/1
1 POWDER RESPIRATORY (INHALATION)
Status: DISCONTINUED | OUTPATIENT
Start: 2024-09-12 | End: 2024-09-12

## 2024-09-12 RX ORDER — CEFAZOLIN SODIUM 2 G/100ML
2 INJECTION, SOLUTION INTRAVENOUS ONCE
Status: DISCONTINUED | OUTPATIENT
Start: 2024-09-12 | End: 2024-09-12 | Stop reason: HOSPADM

## 2024-09-12 RX ORDER — HYDROMORPHONE HYDROCHLORIDE 1 MG/ML
0.2 INJECTION, SOLUTION INTRAMUSCULAR; INTRAVENOUS; SUBCUTANEOUS EVERY 4 HOURS PRN
Status: DISCONTINUED | OUTPATIENT
Start: 2024-09-12 | End: 2024-09-13 | Stop reason: HOSPADM

## 2024-09-12 RX ORDER — ONDANSETRON HYDROCHLORIDE 2 MG/ML
4 INJECTION, SOLUTION INTRAVENOUS ONCE AS NEEDED
Status: DISCONTINUED | OUTPATIENT
Start: 2024-09-12 | End: 2024-09-12 | Stop reason: HOSPADM

## 2024-09-12 RX ORDER — SENNOSIDES 8.6 MG/1
2 TABLET ORAL 2 TIMES DAILY
Status: DISCONTINUED | OUTPATIENT
Start: 2024-09-12 | End: 2024-09-13 | Stop reason: HOSPADM

## 2024-09-12 RX ORDER — CHOLECALCIFEROL (VITAMIN D3) 25 MCG
2000 TABLET ORAL DAILY
Status: DISCONTINUED | OUTPATIENT
Start: 2024-09-12 | End: 2024-09-13 | Stop reason: HOSPADM

## 2024-09-12 RX ORDER — PHENYLEPHRINE HCL IN 0.9% NACL 1 MG/10 ML
SYRINGE (ML) INTRAVENOUS AS NEEDED
Status: DISCONTINUED | OUTPATIENT
Start: 2024-09-12 | End: 2024-09-12

## 2024-09-12 RX ORDER — TIZANIDINE 4 MG/1
4 TABLET ORAL EVERY 6 HOURS PRN
Status: DISCONTINUED | OUTPATIENT
Start: 2024-09-12 | End: 2024-09-13 | Stop reason: HOSPADM

## 2024-09-12 RX ORDER — BUPIVACAINE HCL/EPINEPHRINE 0.25-.0005
VIAL (ML) INJECTION AS NEEDED
Status: DISCONTINUED | OUTPATIENT
Start: 2024-09-12 | End: 2024-09-12 | Stop reason: HOSPADM

## 2024-09-12 RX ORDER — ENOXAPARIN SODIUM 100 MG/ML
40 INJECTION SUBCUTANEOUS EVERY 24 HOURS
Status: DISCONTINUED | OUTPATIENT
Start: 2024-09-12 | End: 2024-09-13 | Stop reason: HOSPADM

## 2024-09-12 RX ORDER — BUSPIRONE HYDROCHLORIDE 5 MG/1
5 TABLET ORAL 2 TIMES DAILY PRN
Status: DISCONTINUED | OUTPATIENT
Start: 2024-09-12 | End: 2024-09-13 | Stop reason: HOSPADM

## 2024-09-12 RX ORDER — LIDOCAINE HYDROCHLORIDE 20 MG/ML
INJECTION, SOLUTION EPIDURAL; INFILTRATION; INTRACAUDAL; PERINEURAL AS NEEDED
Status: DISCONTINUED | OUTPATIENT
Start: 2024-09-12 | End: 2024-09-12

## 2024-09-12 RX ORDER — PANTOPRAZOLE SODIUM 40 MG/10ML
40 INJECTION, POWDER, LYOPHILIZED, FOR SOLUTION INTRAVENOUS DAILY
Status: DISCONTINUED | OUTPATIENT
Start: 2024-09-12 | End: 2024-09-12

## 2024-09-12 RX ORDER — OXYCODONE HYDROCHLORIDE 10 MG/1
10 TABLET ORAL EVERY 4 HOURS PRN
Status: DISCONTINUED | OUTPATIENT
Start: 2024-09-12 | End: 2024-09-12

## 2024-09-12 RX ORDER — ASPIRIN 81 MG/1
81 TABLET ORAL DAILY
Status: DISCONTINUED | OUTPATIENT
Start: 2024-09-12 | End: 2024-09-13 | Stop reason: HOSPADM

## 2024-09-12 RX ORDER — FUROSEMIDE 20 MG/1
20 TABLET ORAL DAILY
Status: DISCONTINUED | OUTPATIENT
Start: 2024-09-12 | End: 2024-09-12

## 2024-09-12 RX ORDER — LIDOCAINE HYDROCHLORIDE 10 MG/ML
0.1 INJECTION, SOLUTION EPIDURAL; INFILTRATION; INTRACAUDAL; PERINEURAL ONCE
Status: DISCONTINUED | OUTPATIENT
Start: 2024-09-12 | End: 2024-09-12 | Stop reason: HOSPADM

## 2024-09-12 RX ORDER — HEPARIN SODIUM 5000 [USP'U]/ML
5000 INJECTION, SOLUTION INTRAVENOUS; SUBCUTANEOUS ONCE
Status: COMPLETED | OUTPATIENT
Start: 2024-09-12 | End: 2024-09-12

## 2024-09-12 RX ORDER — PANTOPRAZOLE SODIUM 20 MG/1
20 TABLET, DELAYED RELEASE ORAL
Status: DISCONTINUED | OUTPATIENT
Start: 2024-09-13 | End: 2024-09-13 | Stop reason: HOSPADM

## 2024-09-12 RX ORDER — FORMOTEROL FUMARATE DIHYDRATE 20 UG/2ML
20 SOLUTION RESPIRATORY (INHALATION)
Status: DISCONTINUED | OUTPATIENT
Start: 2024-09-12 | End: 2024-09-13 | Stop reason: HOSPADM

## 2024-09-12 RX ORDER — MIDAZOLAM HYDROCHLORIDE 1 MG/ML
INJECTION INTRAMUSCULAR; INTRAVENOUS AS NEEDED
Status: DISCONTINUED | OUTPATIENT
Start: 2024-09-12 | End: 2024-09-12

## 2024-09-12 RX ORDER — ONDANSETRON HYDROCHLORIDE 2 MG/ML
4 INJECTION, SOLUTION INTRAVENOUS EVERY 8 HOURS PRN
Status: DISCONTINUED | OUTPATIENT
Start: 2024-09-12 | End: 2024-09-13 | Stop reason: HOSPADM

## 2024-09-12 RX ORDER — OXYCODONE HYDROCHLORIDE 5 MG/1
5 TABLET ORAL EVERY 4 HOURS PRN
Status: DISCONTINUED | OUTPATIENT
Start: 2024-09-12 | End: 2024-09-12 | Stop reason: HOSPADM

## 2024-09-12 RX ORDER — FENTANYL CITRATE 50 UG/ML
INJECTION, SOLUTION INTRAMUSCULAR; INTRAVENOUS AS NEEDED
Status: DISCONTINUED | OUTPATIENT
Start: 2024-09-12 | End: 2024-09-12

## 2024-09-12 RX ORDER — CEFAZOLIN SODIUM 2 G/100ML
2 INJECTION, SOLUTION INTRAVENOUS EVERY 8 HOURS
Status: COMPLETED | OUTPATIENT
Start: 2024-09-12 | End: 2024-09-13

## 2024-09-12 RX ORDER — CARVEDILOL 25 MG/1
25 TABLET ORAL 2 TIMES DAILY
Status: DISCONTINUED | OUTPATIENT
Start: 2024-09-12 | End: 2024-09-13 | Stop reason: HOSPADM

## 2024-09-12 RX ORDER — OXYCODONE HYDROCHLORIDE 5 MG/1
10 TABLET ORAL EVERY 4 HOURS PRN
Status: DISCONTINUED | OUTPATIENT
Start: 2024-09-12 | End: 2024-09-13 | Stop reason: HOSPADM

## 2024-09-12 SDOH — ECONOMIC STABILITY: INCOME INSECURITY: IN THE PAST 12 MONTHS, HAS THE ELECTRIC, GAS, OIL, OR WATER COMPANY THREATENED TO SHUT OFF SERVICE IN YOUR HOME?: NO

## 2024-09-12 SDOH — HEALTH STABILITY: MENTAL HEALTH: HOW OFTEN DO YOU HAVE A DRINK CONTAINING ALCOHOL?: NEVER

## 2024-09-12 SDOH — SOCIAL STABILITY: SOCIAL INSECURITY: ARE THERE ANY APPARENT SIGNS OF INJURIES/BEHAVIORS THAT COULD BE RELATED TO ABUSE/NEGLECT?: NO

## 2024-09-12 SDOH — ECONOMIC STABILITY: INCOME INSECURITY: IN THE LAST 12 MONTHS, WAS THERE A TIME WHEN YOU WERE NOT ABLE TO PAY THE MORTGAGE OR RENT ON TIME?: NO

## 2024-09-12 SDOH — ECONOMIC STABILITY: HOUSING INSECURITY: AT ANY TIME IN THE PAST 12 MONTHS, WERE YOU HOMELESS OR LIVING IN A SHELTER (INCLUDING NOW)?: NO

## 2024-09-12 SDOH — ECONOMIC STABILITY: INCOME INSECURITY: HOW HARD IS IT FOR YOU TO PAY FOR THE VERY BASICS LIKE FOOD, HOUSING, MEDICAL CARE, AND HEATING?: NOT VERY HARD

## 2024-09-12 SDOH — ECONOMIC STABILITY: FOOD INSECURITY: WITHIN THE PAST 12 MONTHS, THE FOOD YOU BOUGHT JUST DIDN'T LAST AND YOU DIDN'T HAVE MONEY TO GET MORE.: NEVER TRUE

## 2024-09-12 SDOH — ECONOMIC STABILITY: FOOD INSECURITY: WITHIN THE PAST 12 MONTHS, YOU WORRIED THAT YOUR FOOD WOULD RUN OUT BEFORE YOU GOT MONEY TO BUY MORE.: NEVER TRUE

## 2024-09-12 SDOH — ECONOMIC STABILITY: HOUSING INSECURITY: IN THE PAST 12 MONTHS, HOW MANY TIMES HAVE YOU MOVED WHERE YOU WERE LIVING?: 0

## 2024-09-12 SDOH — SOCIAL STABILITY: SOCIAL NETWORK: ARE YOU MARRIED, WIDOWED, DIVORCED, SEPARATED, NEVER MARRIED, OR LIVING WITH A PARTNER?: WIDOWED

## 2024-09-12 SDOH — SOCIAL STABILITY: SOCIAL INSECURITY: WITHIN THE LAST YEAR, HAVE YOU BEEN HUMILIATED OR EMOTIONALLY ABUSED IN OTHER WAYS BY YOUR PARTNER OR EX-PARTNER?: NO

## 2024-09-12 SDOH — SOCIAL STABILITY: SOCIAL INSECURITY: WITHIN THE LAST YEAR, HAVE YOU BEEN AFRAID OF YOUR PARTNER OR EX-PARTNER?: NO

## 2024-09-12 SDOH — SOCIAL STABILITY: SOCIAL INSECURITY: HAVE YOU HAD ANY THOUGHTS OF HARMING ANYONE ELSE?: NO

## 2024-09-12 SDOH — SOCIAL STABILITY: SOCIAL INSECURITY: HAVE YOU HAD THOUGHTS OF HARMING ANYONE ELSE?: NO

## 2024-09-12 SDOH — SOCIAL STABILITY: SOCIAL INSECURITY: DO YOU FEEL ANYONE HAS EXPLOITED OR TAKEN ADVANTAGE OF YOU FINANCIALLY OR OF YOUR PERSONAL PROPERTY?: NO

## 2024-09-12 SDOH — SOCIAL STABILITY: SOCIAL NETWORK: HOW OFTEN DO YOU ATTEND CHURCH OR RELIGIOUS SERVICES?: NEVER

## 2024-09-12 SDOH — SOCIAL STABILITY: SOCIAL INSECURITY
WITHIN THE LAST YEAR, HAVE YOU BEEN KICKED, HIT, SLAPPED, OR OTHERWISE PHYSICALLY HURT BY YOUR PARTNER OR EX-PARTNER?: NO

## 2024-09-12 SDOH — HEALTH STABILITY: MENTAL HEALTH
STRESS IS WHEN SOMEONE FEELS TENSE, NERVOUS, ANXIOUS, OR CAN'T SLEEP AT NIGHT BECAUSE THEIR MIND IS TROUBLED. HOW STRESSED ARE YOU?: NOT AT ALL

## 2024-09-12 SDOH — SOCIAL STABILITY: SOCIAL NETWORK: HOW OFTEN DO YOU ATTENT MEETINGS OF THE CLUB OR ORGANIZATION YOU BELONG TO?: NEVER

## 2024-09-12 SDOH — SOCIAL STABILITY: SOCIAL INSECURITY: WERE YOU ABLE TO COMPLETE ALL THE BEHAVIORAL HEALTH SCREENINGS?: YES

## 2024-09-12 SDOH — SOCIAL STABILITY: SOCIAL NETWORK
IN A TYPICAL WEEK, HOW MANY TIMES DO YOU TALK ON THE PHONE WITH FAMILY, FRIENDS, OR NEIGHBORS?: MORE THAN THREE TIMES A WEEK

## 2024-09-12 SDOH — SOCIAL STABILITY: SOCIAL NETWORK
DO YOU BELONG TO ANY CLUBS OR ORGANIZATIONS SUCH AS CHURCH GROUPS UNIONS, FRATERNAL OR ATHLETIC GROUPS, OR SCHOOL GROUPS?: NO

## 2024-09-12 SDOH — HEALTH STABILITY: MENTAL HEALTH
HOW OFTEN DO YOU NEED TO HAVE SOMEONE HELP YOU WHEN YOU READ INSTRUCTIONS, PAMPHLETS, OR OTHER WRITTEN MATERIAL FROM YOUR DOCTOR OR PHARMACY?: NEVER

## 2024-09-12 SDOH — SOCIAL STABILITY: SOCIAL NETWORK: HOW OFTEN DO YOU GET TOGETHER WITH FRIENDS OR RELATIVES?: MORE THAN THREE TIMES A WEEK

## 2024-09-12 SDOH — SOCIAL STABILITY: SOCIAL INSECURITY: HAS ANYONE EVER THREATENED TO HURT YOUR FAMILY OR YOUR PETS?: NO

## 2024-09-12 SDOH — HEALTH STABILITY: PHYSICAL HEALTH: ON AVERAGE, HOW MANY MINUTES DO YOU ENGAGE IN EXERCISE AT THIS LEVEL?: 20 MIN

## 2024-09-12 SDOH — SOCIAL STABILITY: SOCIAL INSECURITY: ARE YOU OR HAVE YOU BEEN THREATENED OR ABUSED PHYSICALLY, EMOTIONALLY, OR SEXUALLY BY ANYONE?: NO

## 2024-09-12 SDOH — SOCIAL STABILITY: SOCIAL INSECURITY: DOES ANYONE TRY TO KEEP YOU FROM HAVING/CONTACTING OTHER FRIENDS OR DOING THINGS OUTSIDE YOUR HOME?: NO

## 2024-09-12 SDOH — SOCIAL STABILITY: SOCIAL INSECURITY: ABUSE: ADULT

## 2024-09-12 SDOH — SOCIAL STABILITY: SOCIAL INSECURITY: DO YOU FEEL UNSAFE GOING BACK TO THE PLACE WHERE YOU ARE LIVING?: NO

## 2024-09-12 SDOH — HEALTH STABILITY: PHYSICAL HEALTH: ON AVERAGE, HOW MANY DAYS PER WEEK DO YOU ENGAGE IN MODERATE TO STRENUOUS EXERCISE (LIKE A BRISK WALK)?: 7 DAYS

## 2024-09-12 SDOH — ECONOMIC STABILITY: INCOME INSECURITY: HOW HARD IS IT FOR YOU TO PAY FOR THE VERY BASICS LIKE FOOD, HOUSING, MEDICAL CARE, AND HEATING?: NOT HARD AT ALL

## 2024-09-12 ASSESSMENT — ACTIVITIES OF DAILY LIVING (ADL)
WALKS IN HOME: INDEPENDENT
HEARING - RIGHT EAR: FUNCTIONAL
DRESSING YOURSELF: NEEDS ASSISTANCE
ASSISTIVE_DEVICE: DENTURES UPPER;DENTURES LOWER
TOILETING: NEEDS ASSISTANCE
HEARING - LEFT EAR: FUNCTIONAL
GROOMING: NEEDS ASSISTANCE
JUDGMENT_ADEQUATE_SAFELY_COMPLETE_DAILY_ACTIVITIES: YES
ADEQUATE_TO_COMPLETE_ADL: YES
BATHING: INDEPENDENT
FEEDING YOURSELF: INDEPENDENT
PATIENT'S MEMORY ADEQUATE TO SAFELY COMPLETE DAILY ACTIVITIES?: YES

## 2024-09-12 ASSESSMENT — PAIN SCALES - GENERAL
PAINLEVEL_OUTOF10: 5 - MODERATE PAIN
PAINLEVEL_OUTOF10: 4
PAINLEVEL_OUTOF10: 8
PAINLEVEL_OUTOF10: 7
PAINLEVEL_OUTOF10: 8
PAINLEVEL_OUTOF10: 4
PAINLEVEL_OUTOF10: 4
PAINLEVEL_OUTOF10: 8
PAINLEVEL_OUTOF10: 0 - NO PAIN
PAINLEVEL_OUTOF10: 7
PAINLEVEL_OUTOF10: 4
PAINLEVEL_OUTOF10: 7
PAINLEVEL_OUTOF10: 8
PAINLEVEL_OUTOF10: 7
PAINLEVEL_OUTOF10: 7
PAINLEVEL_OUTOF10: 4
PAINLEVEL_OUTOF10: 8

## 2024-09-12 ASSESSMENT — PAIN - FUNCTIONAL ASSESSMENT
PAIN_FUNCTIONAL_ASSESSMENT: 0-10

## 2024-09-12 ASSESSMENT — COGNITIVE AND FUNCTIONAL STATUS - GENERAL
MOVING FROM LYING ON BACK TO SITTING ON SIDE OF FLAT BED WITH BEDRAILS: A LITTLE
TURNING FROM BACK TO SIDE WHILE IN FLAT BAD: A LITTLE
MOBILITY SCORE: 17
TOILETING: A LITTLE
STANDING UP FROM CHAIR USING ARMS: A LITTLE
MOVING TO AND FROM BED TO CHAIR: A LITTLE
DRESSING REGULAR LOWER BODY CLOTHING: A LITTLE
DAILY ACTIVITIY SCORE: 22
WALKING IN HOSPITAL ROOM: A LITTLE
CLIMB 3 TO 5 STEPS WITH RAILING: A LOT

## 2024-09-12 ASSESSMENT — COLUMBIA-SUICIDE SEVERITY RATING SCALE - C-SSRS
6. HAVE YOU EVER DONE ANYTHING, STARTED TO DO ANYTHING, OR PREPARED TO DO ANYTHING TO END YOUR LIFE?: NO
1. IN THE PAST MONTH, HAVE YOU WISHED YOU WERE DEAD OR WISHED YOU COULD GO TO SLEEP AND NOT WAKE UP?: NO
2. HAVE YOU ACTUALLY HAD ANY THOUGHTS OF KILLING YOURSELF?: NO

## 2024-09-12 ASSESSMENT — LIFESTYLE VARIABLES
HOW OFTEN DO YOU HAVE 6 OR MORE DRINKS ON ONE OCCASION: NEVER
AUDIT-C TOTAL SCORE: 0
SUBSTANCE_ABUSE_PAST_12_MONTHS: YES
SKIP TO QUESTIONS 9-10: 1
HOW OFTEN DO YOU HAVE A DRINK CONTAINING ALCOHOL: NEVER
AUDIT-C TOTAL SCORE: 0
HOW MANY STANDARD DRINKS CONTAINING ALCOHOL DO YOU HAVE ON A TYPICAL DAY: PATIENT DOES NOT DRINK

## 2024-09-12 ASSESSMENT — PAIN DESCRIPTION - ORIENTATION
ORIENTATION: RIGHT

## 2024-09-12 ASSESSMENT — PAIN DESCRIPTION - DESCRIPTORS
DESCRIPTORS: ACHING;DISCOMFORT
DESCRIPTORS: ACHING;DISCOMFORT

## 2024-09-12 ASSESSMENT — PAIN DESCRIPTION - LOCATION
LOCATION: CHEST
LOCATION: CHEST
LOCATION: ARM
LOCATION: CHEST

## 2024-09-12 ASSESSMENT — PATIENT HEALTH QUESTIONNAIRE - PHQ9
1. LITTLE INTEREST OR PLEASURE IN DOING THINGS: NOT AT ALL
2. FEELING DOWN, DEPRESSED OR HOPELESS: SEVERAL DAYS
SUM OF ALL RESPONSES TO PHQ9 QUESTIONS 1 & 2: 1

## 2024-09-12 NOTE — POST-PROCEDURE NOTE
Serjio Nunes is a 74 y.o. female, current smoker (quit >10 years but recent stressors has started again; 20 pack year hx max), presenting with a right upper lobe lung nodule. Patient was referred by Peyton Alcazar.  Additional h/o left breast cancer status post lumpectomy and chemoradiation, acute on chronic diastolic heart failure presents s/p Right Lung Wedge Resection  with Dr Bansal    Findings: preliminary pathology benign, likely hamartoma     Pt was seen upon arrival to SDU, she is alert and oriented, follows commands, HD stable, repots some surgical pain. CT to suction w/o air leak, 15cc blood drainage noted. Dressing c/d/I      Pulm  - Maintain Chest Tube to Suction- Waterseal at midnight    - May ambulate off suction  - Daily Chest X-ray while chest tube in place  - PRN Duoneb  - resume home regimen  - OOB to chair 2 hours after surgery; ambulate in crain evening of surgery  - IS and acapella Q1H while awake    Neuro: Post op pain  #Gout  - home Colchicine  - PRN Dilaudid and Oxycodone  - Scheduled Tylenol    Cardiac   #HTN, CHF  - Continuous Cardiac Monitor  - Continue home regimen with hold parameters    GI  #GERD  - Clear liquid  Advance as tolerated  - Bowel Regimen  - Home Protonix      - I&O    Heme:  - DVT PPx- SCD may resume heparin this evening at 2100    ID   - Post op Ancef x 24 hours    Dispo: SDU   If AM CXR stable on waterseal plan for ct removal and discharge home tomorrow.     Lin Pritchett, APRN-CNP  Time spent on the assessment of patient, gathering and interpreting data, review of medical record/patient history, personally reviewing radiographic imaging and formulation of this note. With greater than 50% spent in personal discussion with patient/ family.  Time: 30 minutes

## 2024-09-12 NOTE — OP NOTE
Thoracoscopy; Right Lung Wedge Resection (R) Operative Note     Date: 2024  OR Location: [facility] A OR    Name: Serjio Nunes, : 1950, Age: 74 y.o., MRN: [id number], Sex: female    Diagnosis  Pre-op Diagnosis      * Lung nodules [R91.8]     * History of breast cancer [Z85.3] Post-op Diagnosis     * Lung nodules [R91.8]     * History of breast cancer [Z85.3]     Procedures  Thoracoscopy; Right Lung Wedge Resection  87241 - AR THORACOSCOPY W/THERA WEDGE RESEXN INITIAL UNILAT      Surgeons      * Katya Bansal - Primary    Resident/Fellow/Other Assistant:  Surgeons and Role:  * No surgeons found with a matching role *    Procedure Summary  Anesthesia: General  ASA: III  Anesthesia Staff: Anesthesiologist: Abelardo Wu MD  C-AA: YAMILKA Tubbs  Estimated Blood Loss: 50mL  Intra-op Medications:   Administrations occurring from 0800 to 1000 on 24:   Medication Name Total Dose   BUPivacaine-EPINEPHrine (Marcaine w/EPI) 0.25 %-1:200,000 injection 30 mL   lactated Ringer's infusion Cannot be calculated   HYDROmorphone (Dilaudid) injection 0.5 mg 0.5 mg   lactated Ringer's infusion 13.33 mL              Anesthesia Record               Intraprocedure I/O Totals          Output    Est. Blood Loss 10 mL    Total Output 10 mL          Specimen:   ID Type Source Tests Collected by Time   1 : RIGHT LOBE WEDGE RESECTION Tissue LUNG LOBECTOMY/SEGMENTECTOMY RIGHT (SPECIFY SITE) SURGICAL PATHOLOGY EXAM Katya Bansal DO 2024 0853        Staff:   Circulator: Martin Denisub Person: Marylu Denisub Person: Hayden Fernandez Circulator: Breanna Fernandez Scrub: Soumya         Drains and/or Catheters:   Chest Tube Right Fifth intercostal space 28 Fr (Active)   Dressing Status Clean;Dry 24 1140   Site Assessment Clean;Dry;Intact 24 1140   Output (mL) 12 mL 24 1145       Tourniquet Times:         Implants:     Findings: preliminary pathology benign, likely hamartoma    Indications:  Serjio Nunes is an 74 y.o. female who is having surgery for Lung nodules [R91.8]  History of breast cancer [Z85.3]. She was noted to have a new right upper lobe lung nodule -- given her history and the appearance we discussed ddx including a cancer (metastatic or primary) and surgical resection which patient was agreeable to.     The patient was seen in the preoperative area. The risks, benefits, complications, treatment options, non-operative alternatives, expected recovery and outcomes were discussed with the patient. The possibilities of reaction to medication, pulmonary aspiration, injury to surrounding structures, bleeding, recurrent infection, the need for additional procedures, failure to diagnose a condition, and creating a complication requiring transfusion or operation were discussed with the patient. The patient concurred with the proposed plan, giving informed consent.  The site of surgery was properly noted/marked if necessary per policy. The patient has been actively warmed in preoperative area. Preoperative antibiotics have been ordered and given within 1 hours of incision. Venous thrombosis prophylaxis have been ordered including bilateral sequential compression devices and chemical prophylaxis    Procedure Details: Patient was brought into the operating suite and procedural information was confirmed.  General anesthesia was induced and a double-lumen endotracheal tube was placed.  Patient was transition to the left lateral decubitus position and  appropriately padded.  Her right chest was prepped and draped in the typical sterile fashion. We began with a 12 mm incision in the 7th intercostal space along the posterior axillary line. Camera was inserted and no disseminated disease was seen. We then placed an additional port several cm below the scapular tip. Lastly, our 2cm working port was placed in the 4th intercostal space and an Ryan wound protector was placed.  We palpated the lesion in  the anterior right upper lobe just above the minor fissure which was incomplete.  Patient had interesting airway anatomy which made 1 lung ventilation difficult, we therefore performed a wedge resection of the lesion using intermittent apnea.  Wedge was performed with serial black endoscopic staple loads.  Preliminary pathology was consistent with a benign lesion, likely hamartoma.  We therefore confirmed hemostasis and performed intercostal nerve blocks.  We placed a 28 Spanish straight chest tube up in the apex and lung expanded well under visualization.  We then closed our incisions in layers and secured our tube.  Patient was placed supine, extubated, and taken to PACU in stable condition.        Complications:  None; patient tolerated the procedure well.    Disposition: PACU - hemodynamically stable.  Condition: stable         Additional Details:   to PACU then step down   CT to -20mmHg, water seal at midnight  OOB/ambulate/IS  ADAT  Ok for SQH  Multimodal pain control  CXR in PACU and AM      Attending Attestation: I was present for the entire procedure.    Katya Bansal  Phone Number: [phone]

## 2024-09-12 NOTE — CARE PLAN
Problem: Nutrition  Goal: Less than 5 days NPO/clear liquids  Outcome: Progressing  Goal: Oral intake greater than 50%  Outcome: Progressing  Goal: Oral intake greater 75%  Outcome: Progressing  Goal: Consume prescribed supplement  Outcome: Progressing  Goal: Adequate PO fluid intake  Outcome: Progressing  Goal: Nutrition support goals are met within 48 hrs  Outcome: Progressing  Goal: Nutrition support is meeting 75% of nutrient needs  Outcome: Progressing  Goal: Tube feed tolerance  Outcome: Progressing  Goal: BG  mg/dL  Outcome: Progressing  Goal: Lab values WNL  Outcome: Progressing  Goal: Electrolytes WNL  Outcome: Progressing  Goal: Promote healing  Outcome: Progressing  Goal: Maintain stable weight  Outcome: Progressing  Goal: Reduce weight from edema/fluid  Outcome: Progressing  Goal: Gradual weight gain  Outcome: Progressing  Goal: Improve ostomy output  Outcome: Progressing   The patient's goals for the shift include      The clinical goals for the shift include      Over the shift, the patient did not make progress toward the following goals. Barriers to progression include . Recommendations to address these barriers include .

## 2024-09-12 NOTE — ANESTHESIA PREPROCEDURE EVALUATION
Patient: Serjio Nunes    Procedure Information       Date/Time: 09/12/24 0800    Procedure: Thoracoscopy; Right Lung Wedge Resection (Right: Chest)    Location: [facility] A OR 01 / [facility] OR    Surgeons: Katya Bansal DO          right upper lobe 1.0 cm lung nodule         IMPRESSION:  1. Normal stress myocardial perfusion imaging in response to  pharmacologic stress.  2. Well-maintained left ventricular function.    Relevant Problems   Cardiac   (+) Acute on chronic diastolic congestive heart failure (Multi)   (+) Atypical chest pain   (+) HLD (hyperlipidemia)   (+) Hypertension   (+) Nonrheumatic aortic valve insufficiency   (+) PVD (peripheral vascular disease) (CMS-HCC)   (+) Pure hypercholesterolemia      Pulmonary   (+) Asthma (HHS-HCC)   (+) Chronic obstructive lung disease (Multi)   (+) WITT (dyspnea on exertion)   (+) Lung nodules   (+) Obstructive sleep apnea      Neuro   (+) Anxiety   (+) Bipolar disorder (Multi)   (+) Bipolar disorder, current episode manic without psychotic features (Multi)   (+) C6 radiculopathy   (+) Lumbar radiculopathy   (+) Moderate bipolar I disorder, most recent episode depressed (Multi)   (+) Neuropathy, lower extremity      GI   (+) Chronic diarrhea of unknown origin   (+) Esophageal reflux   (+) Gastroesophageal reflux disease without esophagitis      Endocrine   (+) Long term (current) use of systemic steroids      Hematology   (+) Anemia      Musculoskeletal   (+) Degeneration of intervertebral disc at L5-S1 level   (+) Degeneration of lumbar or lumbosacral intervertebral disc   (+) Fibromyalgia   (+) Osteoarthritis of left shoulder due to rotator cuff injury   (+) Osteoarthritis, lower leg, localized   (+) Primary localized osteoarthritis of both hips   (+) Primary osteoarthritis of left knee   (+) Primary osteoarthritis of right knee   (+) Pseudogout      HEENT   (+) Acute frontal sinusitis   (+) Sinusitis      ID   (+) Atypical pneumonia   (+) Fungal infection       Skin   (+) Pelvic rash      GYN   (+) Malignant neoplasm of upper outer quadrant of female breast (Multi)       Clinical information reviewed:    Allergies  Meds               NPO Detail:  No data recorded     Physical Exam    Airway  Mallampati: II  TM distance: >3 FB  Neck ROM: full     Cardiovascular   Rhythm: regular  Rate: normal     Dental    Pulmonary   Breath sounds clear to auscultation     Abdominal            Anesthesia Plan    History of general anesthesia?: yes  History of complications of general anesthesia?: no    ASA 3     general     intravenous induction   Anesthetic plan and risks discussed with patient.    Plan discussed with CRNA.

## 2024-09-12 NOTE — ANESTHESIA POSTPROCEDURE EVALUATION
Patient: Serjio Nunes    Procedure Summary       Date: 09/12/24 Room / Location: [facility] OR 01 / [facility]    Anesthesia Start: 0826 Anesthesia Stop: 0955    Procedure: Thoracoscopy; Right Lung Wedge Resection (Right: Chest) Diagnosis:       Lung nodules      History of breast cancer      (Lung nodules [R91.8])      (History of breast cancer [Z85.3])    Surgeons: Katya Bansal DO Responsible Provider: Abelardo Wu MD    Anesthesia Type: general ASA Status: 3            Anesthesia Type: general    Vitals Value Taken Time   /57 09/12/24 1147   Temp 36.2 °C (97.2 °F) 09/12/24 1130   Pulse 68 09/12/24 1147   Resp 17 09/12/24 1145   SpO2 97 % 09/12/24 1147   Vitals shown include unfiled device data.    Anesthesia Post Evaluation    Patient location during evaluation: bedside  Patient participation: complete - patient participated  Level of consciousness: awake  Pain management: adequate  Multimodal analgesia pain management approach  Airway patency: patent  Cardiovascular status: stable  Respiratory status: spontaneous ventilation and unassisted  Hydration status: acceptable  Postoperative Nausea and Vomiting: none  Comments: No significant PONV.      No notable events documented.

## 2024-09-12 NOTE — ANESTHESIA PROCEDURE NOTES
Airway  Date/Time: 9/12/2024 8:32 AM  Urgency: elective    Airway not difficult    Staffing  Performed: YAMILKA   Authorized by: Abelardo Wu MD    Performed by: YAMILKA Tubbs  Patient location during procedure: OR    Indications and Patient Condition  Indications for airway management: anesthesia  Spontaneous Ventilation: absent  Sedation level: deep  Preoxygenated: yes  Patient position: sniffing  MILS maintained throughout  Mask difficulty assessment: 1 - vent by mask    Final Airway Details  Final airway type: endotracheal airway      Successful airway: ETT - double lumen left  Cuffed: yes   Successful intubation technique: video laryngoscopy  Facilitating devices/methods: intubating stylet  Blade size: #3  ETT DL size (fr): 35  Cormack-Lehane Classification: grade I - full view of glottis  Placement verified by: chest auscultation and capnometry   Measured from: lips  ETT to lips (cm): 29  Number of attempts at approach: 1    Additional Comments  35 L NICOLAS. Intubated with glidescope 3. Grade 1 view.

## 2024-09-13 ENCOUNTER — APPOINTMENT (OUTPATIENT)
Dept: RADIOLOGY | Facility: HOSPITAL | Age: 74
End: 2024-09-13
Payer: COMMERCIAL

## 2024-09-13 ENCOUNTER — APPOINTMENT (OUTPATIENT)
Dept: PRIMARY CARE | Facility: CLINIC | Age: 74
End: 2024-09-13
Payer: COMMERCIAL

## 2024-09-13 VITALS
TEMPERATURE: 98.6 F | RESPIRATION RATE: 16 BRPM | HEIGHT: 63 IN | SYSTOLIC BLOOD PRESSURE: 158 MMHG | HEART RATE: 62 BPM | BODY MASS INDEX: 30.62 KG/M2 | WEIGHT: 172.84 LBS | DIASTOLIC BLOOD PRESSURE: 81 MMHG | OXYGEN SATURATION: 94 %

## 2024-09-13 LAB
ANION GAP SERPL CALC-SCNC: 16 MMOL/L (ref 10–20)
BUN SERPL-MCNC: 12 MG/DL (ref 6–23)
CALCIUM SERPL-MCNC: 9.2 MG/DL (ref 8.6–10.3)
CHLORIDE SERPL-SCNC: 100 MMOL/L (ref 98–107)
CO2 SERPL-SCNC: 24 MMOL/L (ref 21–32)
CREAT SERPL-MCNC: 1.34 MG/DL (ref 0.5–1.05)
EGFRCR SERPLBLD CKD-EPI 2021: 42 ML/MIN/1.73M*2
ERYTHROCYTE [DISTWIDTH] IN BLOOD BY AUTOMATED COUNT: 14.7 % (ref 11.5–14.5)
GLUCOSE BLD MANUAL STRIP-MCNC: 138 MG/DL (ref 74–99)
GLUCOSE SERPL-MCNC: 155 MG/DL (ref 74–99)
HCT VFR BLD AUTO: 37.9 % (ref 36–46)
HGB BLD-MCNC: 12.4 G/DL (ref 12–16)
MAGNESIUM SERPL-MCNC: 1.7 MG/DL (ref 1.6–2.4)
MCH RBC QN AUTO: 30 PG (ref 26–34)
MCHC RBC AUTO-ENTMCNC: 32.7 G/DL (ref 32–36)
MCV RBC AUTO: 92 FL (ref 80–100)
NRBC BLD-RTO: 0 /100 WBCS (ref 0–0)
PLATELET # BLD AUTO: 310 X10*3/UL (ref 150–450)
POTASSIUM SERPL-SCNC: 3.1 MMOL/L (ref 3.5–5.3)
RBC # BLD AUTO: 4.13 X10*6/UL (ref 4–5.2)
SODIUM SERPL-SCNC: 137 MMOL/L (ref 136–145)
WBC # BLD AUTO: 25.6 X10*3/UL (ref 4.4–11.3)

## 2024-09-13 PROCEDURE — 83735 ASSAY OF MAGNESIUM: CPT | Performed by: NURSE PRACTITIONER

## 2024-09-13 PROCEDURE — 94640 AIRWAY INHALATION TREATMENT: CPT

## 2024-09-13 PROCEDURE — 82947 ASSAY GLUCOSE BLOOD QUANT: CPT

## 2024-09-13 PROCEDURE — 2500000002 HC RX 250 W HCPCS SELF ADMINISTERED DRUGS (ALT 637 FOR MEDICARE OP, ALT 636 FOR OP/ED): Performed by: NURSE PRACTITIONER

## 2024-09-13 PROCEDURE — 85027 COMPLETE CBC AUTOMATED: CPT | Performed by: NURSE PRACTITIONER

## 2024-09-13 PROCEDURE — 9420000001 HC RT PATIENT EDUCATION 5 MIN

## 2024-09-13 PROCEDURE — 71045 X-RAY EXAM CHEST 1 VIEW: CPT | Performed by: RADIOLOGY

## 2024-09-13 PROCEDURE — 71045 X-RAY EXAM CHEST 1 VIEW: CPT

## 2024-09-13 PROCEDURE — 2500000002 HC RX 250 W HCPCS SELF ADMINISTERED DRUGS (ALT 637 FOR MEDICARE OP, ALT 636 FOR OP/ED): Performed by: STUDENT IN AN ORGANIZED HEALTH CARE EDUCATION/TRAINING PROGRAM

## 2024-09-13 PROCEDURE — 36415 COLL VENOUS BLD VENIPUNCTURE: CPT | Performed by: NURSE PRACTITIONER

## 2024-09-13 PROCEDURE — 99238 HOSP IP/OBS DSCHRG MGMT 30/<: CPT | Performed by: NURSE PRACTITIONER

## 2024-09-13 PROCEDURE — 80048 BASIC METABOLIC PNL TOTAL CA: CPT | Performed by: NURSE PRACTITIONER

## 2024-09-13 PROCEDURE — 2500000004 HC RX 250 GENERAL PHARMACY W/ HCPCS (ALT 636 FOR OP/ED): Mod: JZ | Performed by: NURSE PRACTITIONER

## 2024-09-13 PROCEDURE — 2500000001 HC RX 250 WO HCPCS SELF ADMINISTERED DRUGS (ALT 637 FOR MEDICARE OP): Performed by: NURSE PRACTITIONER

## 2024-09-13 RX ORDER — MAGNESIUM SULFATE HEPTAHYDRATE 40 MG/ML
2 INJECTION, SOLUTION INTRAVENOUS ONCE
Status: COMPLETED | OUTPATIENT
Start: 2024-09-13 | End: 2024-09-13

## 2024-09-13 RX ORDER — ACETAMINOPHEN 325 MG/1
650 TABLET ORAL EVERY 6 HOURS
COMMUNITY
Start: 2024-09-13 | End: 2024-09-20

## 2024-09-13 RX ORDER — SENNOSIDES 8.6 MG/1
2 TABLET ORAL 2 TIMES DAILY
Qty: 28 TABLET | Refills: 0 | Status: SHIPPED | OUTPATIENT
Start: 2024-09-13 | End: 2024-09-20

## 2024-09-13 RX ORDER — OXYCODONE HYDROCHLORIDE 5 MG/1
5 TABLET ORAL EVERY 6 HOURS PRN
Qty: 12 TABLET | Refills: 0 | Status: SHIPPED | OUTPATIENT
Start: 2024-09-13 | End: 2024-09-16

## 2024-09-13 RX ORDER — POLYETHYLENE GLYCOL 3350 17 G/17G
17 POWDER, FOR SOLUTION ORAL DAILY
Qty: 7 PACKET | Refills: 0 | Status: SHIPPED | OUTPATIENT
Start: 2024-09-14 | End: 2024-09-21

## 2024-09-13 RX ORDER — POTASSIUM CHLORIDE 20 MEQ/1
40 TABLET, EXTENDED RELEASE ORAL ONCE
Status: COMPLETED | OUTPATIENT
Start: 2024-09-13 | End: 2024-09-13

## 2024-09-13 ASSESSMENT — PAIN SCALES - GENERAL
PAINLEVEL_OUTOF10: 5 - MODERATE PAIN
PAINLEVEL_OUTOF10: 7
PAINLEVEL_OUTOF10: 3
PAINLEVEL_OUTOF10: 7

## 2024-09-13 ASSESSMENT — COGNITIVE AND FUNCTIONAL STATUS - GENERAL
MOVING FROM LYING ON BACK TO SITTING ON SIDE OF FLAT BED WITH BEDRAILS: A LITTLE
TURNING FROM BACK TO SIDE WHILE IN FLAT BAD: A LITTLE
WALKING IN HOSPITAL ROOM: A LITTLE
MOBILITY SCORE: 17
CLIMB 3 TO 5 STEPS WITH RAILING: A LOT
DRESSING REGULAR LOWER BODY CLOTHING: A LITTLE
TOILETING: A LITTLE
MOVING TO AND FROM BED TO CHAIR: A LITTLE
DAILY ACTIVITIY SCORE: 22
STANDING UP FROM CHAIR USING ARMS: A LITTLE

## 2024-09-13 ASSESSMENT — PAIN DESCRIPTION - DESCRIPTORS
DESCRIPTORS: ACHING;DISCOMFORT
DESCRIPTORS: ACHING;DISCOMFORT

## 2024-09-13 ASSESSMENT — ACTIVITIES OF DAILY LIVING (ADL): LACK_OF_TRANSPORTATION: NO

## 2024-09-13 ASSESSMENT — PAIN - FUNCTIONAL ASSESSMENT
PAIN_FUNCTIONAL_ASSESSMENT: 0-10
PAIN_FUNCTIONAL_ASSESSMENT: 0-10

## 2024-09-13 NOTE — PROGRESS NOTES
Pharmacy Medication History Review   Spoke to the patient. Pt takes ASA daily. There were a few med changes, but were already updated  Serjio Nunes is a 74 y.o. female admitted for History of breast cancer. Pharmacy reviewed the patient's vacbe-du-diktwhana medications and allergies for accuracy.    The list below reflectives the updated PTA list. Please review each medication in order reconciliation for additional clarification and justification.     Prior to Admission Medications   Prescriptions Last Dose Informant   FreeStyle Jaycob 14 Day Sensor kit Past Month    Sig: APPLY SENSOR TO BACK UPPER ARM REMOVE AND REPLACE EVERY 14 DAYS USE WITH DEVICE   Klor-Con M10 10 mEq ER tablet     Sig: TAKE 1 TABLET BY MOUTH EVERY DAY   Tradjenta 5 mg tablet 9/11/2024    Sig: TAKE 1 TABLET BY MOUTH EVERY DAY AS DIRECTED   albuterol 90 mcg/actuation inhaler 9/12/2024    Sig: Inhale 1 puff 3 times a day.   amLODIPine (Norvasc) 10 mg tablet 9/12/2024    Sig: TAKE 1 TABLET BY MOUTH EVERY DAY   aspirin 81 mg EC tablet 9/12/2024    Sig: Take 1 tablet (81 mg) by mouth once daily.   busPIRone (Buspar) 5 mg tablet 9/11/2024    Sig: TAKE 1 TABLET (5 MG) BY MOUTH 2 TIMES A DAY AS NEEDED (WHEN FEELING ANXIOUS).   carvedilol (Coreg) 25 mg tablet 9/12/2024    Sig: TAKE 1 TABLET BY MOUTH TWICE A DAY   chlorhexidine (Peridex) 0.12 % solution 9/12/2024    Sig: 15 ml swish and spit for 30 seconds night prior to surgery and morning of surgery   cholecalciferol (Vitamin D-3) 50 MCG (2000 UT) tablet Past Week    Sig: Take 1 tablet (2,000 Units) by mouth once daily.   clotrimazole-betamethasone (Lotrisone) cream Not Taking    Sig: APPLY THIN COAT TO AFFECTED AREA TWICE A DAY IN THE MORNING AND IN THE EVENING   Patient not taking: Reported on 9/4/2024   colchicine, gout, 0.6 mg tablet 9/12/2024    Sig: TAKE 1 TABLET BY MOUTH EVERY DAY   fluocinonide 0.05 % cream Past Week    Sig: Apply topically 2 times a day. APPLY SPARINGLY TO AFFECTED AREA    fluticasone (Flonase) 50 mcg/actuation nasal spray 9/12/2024    Sig: Administer 1 spray into each nostril once daily.   fluticasone propion-salmeteroL (Advair Diskus) 250-50 mcg/dose diskus inhaler 9/11/2024    Sig: Inhale 1 puff 2 times a day.   furosemide (Lasix) 40 mg tablet 9/11/2024    Sig: Take 0.5 tablets (20 mg) by mouth once daily.   guaiFENesin (Mucinex) 600 mg 12 hr tablet Not Taking    Sig: Take 2 tablets (1,200 mg) by mouth 2 times a day. Do not crush, chew, or split.   Patient not taking: Reported on 9/12/2024   ipratropium-albuteroL (Duo-Neb) 0.5-2.5 mg/3 mL nebulizer solution Not Taking    Sig: TAKE 3 ML BY NEBULIZATION 3 TIMES A DAY   Patient not taking: Reported on 9/4/2024   losartan (Cozaar) 100 mg tablet 9/11/2024    Sig: Take 0.5 tablets (50 mg) by mouth once daily.   miconazole (Micotin) 2 % cream Past Week    Sig: miconazole nitrate 2 % topical cream   APPLY SPARINGLY TO AFFECTED AREA TWICE A DAY   nitroglycerin (Nitrostat) 0.4 mg SL tablet     Sig: Place 1 tablet (0.4 mg) under the tongue every 5 minutes if needed for chest pain.   omeprazole OTC (PriLOSEC OTC) 20 mg EC tablet 9/12/2024    Sig: Take 1 tablet (20 mg) by mouth once daily. Do not crush, chew, or split.   tiZANidine (Zanaflex) 4 mg tablet Past Week    Sig: TAKE 1 TABLET (4 MG) BY MOUTH EVERY 6 HOURS IF NEEDED FOR MUSCLE SPASMS      Facility-Administered Medications: None       The list below reflectives the updated allergy list. Please review each documented allergy for additional clarification and justification.  Allergies  Reviewed by Jackie Flores RN on 9/12/2024        Severity Reactions Comments    Iodinated Contrast Media High Anaphylaxis     [place] High Angioedema     Iodine Low Swelling     Latex Low Rash             Below are additional concerns with the patient's PTA list.      Ericka Rogers

## 2024-09-13 NOTE — PROGRESS NOTES
09/13/24 0805   [facility] Disability Status   Are you deaf or do you have serious difficulty hearing? N   Are you blind or do you have serious difficulty seeing, even when wearing glasses? N   Because of a physical, mental, or emotional condition, do you have serious difficulty concentrating, remembering, or making decisions? (5 years old or older) N   Do you have serious difficulty walking or climbing stairs? N   Do you have serious difficulty dressing or bathing? N   Because of a physical, mental, or emotional condition, do you have serious difficulty doing errands alone such as visiting the doctor? N

## 2024-09-13 NOTE — PROGRESS NOTES
"Serjio Nunes is a 74 y.o. female who was referred to the Clinical Pharmacy Team to complete a virtual Transitions of Care encounter for discharge medication optimization. The patient was referred for their T2DM, HFpEF, asthma.    Attending: Dr. Katya Bansal    PCP: Dr. Raymond Carnes    _______________________________________________________________________  PHARMACY ASSESSMENT    Home Pharmacy: [facility]  Meds to beds? no    No issues reported in regards to affordability, accessibility, adherence, organization, and adverse effects  _______________________________________________________________________  DIABETES ASSESSMENT    CURRENT PHARMACOTHERAPY  - Tradjenta 5mg daily    SECONDARY PREVENTION  - Statin? no  - ACE-I/ARB? Losartan 50mg daily  - Aspirin? no    Lab Results   Component Value Date    HGBA1C 6.2 (H) 08/05/2024       Lab Results   Component Value Date    CHOL 234 (H) 08/05/2024    CHOL 254 (H) 12/18/2023    CHOL 197 06/20/2023     Lab Results   Component Value Date    HDL 34.6 08/05/2024    HDL 62.6 12/18/2023    HDL 48.5 06/20/2023     Lab Results   Component Value Date    LDLCALC 150 08/05/2024    LDLCALC 163 (H) 12/18/2023     Lab Results   Component Value Date    TRIG 249 (H) 08/05/2024    TRIG 143 12/18/2023    TRIG 147 06/20/2023     No components found for: \"CHOLHDL\"    _______________________________________________________________________  CHRONIC HEART FAILURE ASSESSMENT  Patient reports occasional foot and hand swelling. Occasional chest pain resolved with nitroglycerin as well.    -HTN present/diagnosed: yes    LVEF: 55-60%  eGFR: 42  Beta blocker: carvedilol 25mg BID  ACEi/ARB/ARNI: losartan 25mg daily  MRA: no  SGLT2i: no  _______________________________________________________________________  ASTHMA/COPD ASSESSMENT    CURRENT PHARMACOTHERAPY  - Duonebs prn  - Albuterol HFA prn    RESCUE INHALER USE  - Infrequent, usually has symptoms when weather is " high-humidity    SECONDARY PREVENTION  - Influenza: recommended yearly (DUE)  - Pneumococcal: Prevnar 13 in 2019; Pneumovax 23 in 2017  - RSV: no history  - COVID: appears primary series only in 2021  ___________________________________________________________________  PATIENT EDUCATION/GOALS  - Introduced post-discharge pharmacy team and how they help people with their health goals  - Answered all patient questions and concerns to best of my ability  _______________________________________________________________________  RECOMMENDATIONS/PLAN  Lung mass under investigation - prioritize patient goals, but could consider SGLT2i for DM/HF and CKD  Continue all medications per medical team  Please send prescriptions to [facility] pharmacy for assistance on insurance prior authorization and copay. Prescriptions will be delivered to the patient's bedside prior to discharge with the Meds to Beds program.   Continuity of care will be provided by PCP and clinical pharmacy team      Liya Coyle PharmD    Verbal consent to manage patient's drug therapy was obtained from the patient. They were informed they may decline to participate or withdraw from participation in pharmacy services at any time.

## 2024-09-13 NOTE — DISCHARGE SUMMARY
Discharge Diagnosis  History of breast cancer    Issues Requiring Follow-Up  Pathology    Test Results Pending At Discharge  Pending Labs       Order Current Status    Surgical Pathology Exam In process            Hospital Course   Serjio Nunes is a 74 y.o. female, current smoker (quit >10 years but recent stressors has started again; 20 pack year hx max), presenting with a right upper lobe lung nodule. Patient was referred by Peyton Alcazar.  Additional h/o left breast cancer status post lumpectomy and chemoradiation, acute on chronic diastolic heart failure presents s/p Right Lung Wedge Resection  with Dr Bansal 9/12    Findings: preliminary pathology benign, likely hamartoma  Post op course unremarkable Chest tube removed POD #1, jessica op abx complete   At time of discharge pt was tolerating diet, ambulating in [place], on RA, NAD, pain controlled with oxy and tylenol.       Pertinent Physical Exam At Time of Discharge  Physical Exam  Vitals reviewed.   Constitutional:       General: She is not in acute distress.     Appearance: Normal appearance. She is not ill-appearing.   Cardiovascular:      Rate and Rhythm: Normal rate and regular rhythm.   Pulmonary:      Effort: No respiratory distress.      Breath sounds: No stridor.      Comments: Shallow respirations, Equal chest rise, Chest tube site dressing  c/d/I,  surgical sites steri strips in place w/o drainage or hematoma      Skin:     General: Skin is warm and dry.   Neurological:      General: No focal deficit present.      Mental Status: She is alert and oriented to person, place, and time.   Psychiatric:         Mood and Affect: Mood normal.         Behavior: Behavior normal.       Home Medications     Medication List      START taking these medications     acetaminophen 325 mg tablet; Commonly known as: Tylenol; Take 2 tablets   (650 mg) by mouth every 6 hours for 7 days.   oxyCODONE 5 mg immediate release tablet; Commonly known as: Roxicodone;   Take 1  tablet (5 mg) by mouth every 6 hours if needed for severe pain (7 -   10) for up to 3 days.   polyethylene glycol 17 gram packet; Commonly known as: Glycolax,   Miralax; Take 17 g by mouth once daily for 7 days. Hold for diarrhea;   Start taking on: September 14, 2024   sennosides 8.6 mg tablet; Commonly known as: Senokot; Take 2 tablets   (17.2 mg) by mouth 2 times a day for 7 days. Hold for diarrhea     CHANGE how you take these medications     colchicine 0.6 mg tablet; TAKE 1 TABLET BY MOUTH EVERY DAY; What   changed: how much to take     CONTINUE taking these medications     albuterol 90 mcg/actuation inhaler; Inhale 1 puff 3 times a day.   amLODIPine 10 mg tablet; Commonly known as: Norvasc; TAKE 1 TABLET BY   MOUTH EVERY DAY   aspirin 81 mg EC tablet   busPIRone 5 mg tablet; Commonly known as: Buspar; TAKE 1 TABLET (5 MG)   BY MOUTH 2 TIMES A DAY AS NEEDED (WHEN FEELING ANXIOUS).   carvedilol 25 mg tablet; Commonly known as: Coreg; TAKE 1 TABLET BY   MOUTH TWICE A DAY   chlorhexidine 0.12 % solution; Commonly known as: Peridex; 15 ml swish   and spit for 30 seconds night prior to surgery and morning of surgery   cholecalciferol 50 MCG (2000 UT) tablet; Commonly known as: Vitamin D-3   fluocinonide 0.05 % cream; Commonly known as: Lidex   fluticasone 50 mcg/actuation nasal spray; Commonly known as: Flonase   fluticasone propion-salmeteroL 250-50 mcg/dose diskus inhaler; Commonly   known as: Advair Diskus   FreeStyle Jaycob 14 Day Sensor kit; Generic drug: flash glucose sensor   kit; APPLY SENSOR TO BACK UPPER ARM REMOVE AND REPLACE EVERY 14 DAYS USE   WITH DEVICE   furosemide 40 mg tablet; Commonly known as: Lasix; Take 0.5 tablets (20   mg) by mouth once daily.   Klor-Con M10 10 mEq ER tablet; Generic drug: potassium chloride CR; TAKE   1 TABLET BY MOUTH EVERY DAY   losartan 100 mg tablet; Commonly known as: Cozaar; Take 0.5 tablets (50   mg) by mouth once daily.   miconazole 2 % cream; Commonly known as:  Micotin   nitroglycerin 0.4 mg SL tablet; Commonly known as: Nitrostat; Place 1   tablet (0.4 mg) under the tongue every 5 minutes if needed for chest pain.   omeprazole OTC 20 mg EC tablet; Commonly known as: PriLOSEC OTC; Take 1   tablet (20 mg) by mouth once daily. Do not crush, chew, or split.   tiZANidine 4 mg tablet; Commonly known as: Zanaflex; TAKE 1 TABLET (4   MG) BY MOUTH EVERY 6 HOURS IF NEEDED FOR MUSCLE SPASMS   Tradjenta 5 mg tablet; Generic drug: linaGLIPtin; TAKE 1 TABLET BY MOUTH   EVERY DAY AS DIRECTED     ASK your doctor about these medications     clotrimazole-betamethasone cream; Commonly known as: Lotrisone; APPLY   THIN COAT TO AFFECTED AREA TWICE A DAY IN THE MORNING AND IN THE EVENING   guaiFENesin 600 mg 12 hr tablet; Commonly known as: Mucinex; Take 2   tablets (1,200 mg) by mouth 2 times a day. Do not crush, chew, or split.   ipratropium-albuteroL 0.5-2.5 mg/3 mL nebulizer solution; Commonly known   as: Duo-Neb; TAKE 3 ML BY NEBULIZATION 3 TIMES A DAY       Outpatient Follow-Up  Future Appointments   Date Time Provider Department Center   9/20/2024  8:00 AM [id number] RM 1 [id number] [place]   9/23/2024  2:00 PM Raymond Carnes MD [id number] [facility]   9/24/2024  9:00 AM Maritza Smith MD, MS [id number] [facility]   10/22/2024  9:15 AM Brody MOON MD [id number] [facility]   12/17/2024  3:00 PM Dimitris Mishra MD [id number] [facility]       Lin Pritchett, APRN-CNP

## 2024-09-13 NOTE — PROGRESS NOTES
Home with daughter      09/13/24 0759   Current Planned Discharge Disposition   Current Planned Discharge Disposition Home

## 2024-09-13 NOTE — PROGRESS NOTES
Transitional Care Coordination Progress Note:  Plan per Medical/Surgical team: treatment of lung nodule, POD #1 of thoracoscopy with right lung wedge resection with inhalers, IV ATB, Dilaudid, oxy, IV fluids, oxygen @ 2 liters NC, thoracic surgery following   Status: Inpatient   Payor source: [employer]ha [employer]   Discharge disposition: Home with daughter   Potential Barriers: CT to -20mmHg, water seal   NEED TO INCREASE AMBULATION & WEAN OXYGEN  ADOD: 9/13/2204   L Shantell Salazar RN, BSN Transitional Care Coordinator ED# [phone]      09/13/24 0805   Discharge Planning   Living Arrangements Children   Support Systems Children   Assistance Needed CT to -20mmHg, water seal, increased ambulation, post op pain management   Type of Residence Private residence   Number of Stairs to Enter Residence 13   Number of Stairs Within Residence 0   Home or Post Acute Services None   Expected Discharge Disposition Home   Does the patient need discharge transport arranged? No   Financial Resource Strain   How hard is it for you to pay for the very basics like food, housing, medical care, and heating? Not hard   Housing Stability   In the last 12 months, was there a time when you were not able to pay the mortgage or rent on time? N   In the past 12 months, how many times have you moved where you were living? 1   At any time in the past 12 months, were you homeless or living in a shelter (including now)? N   Transportation Needs   In the past 12 months, has lack of transportation kept you from medical appointments or from getting medications? no   In the past 12 months, has lack of transportation kept you from meetings, work, or from getting things needed for daily living? No

## 2024-09-13 NOTE — SIGNIFICANT EVENT
Chest x-ray reviewed.  Chest tube negative for airleak. Site cleansed with chlorhexidine, suture removed, chest tube was removed without difficulty, dry clean dressing applied, site care instructions given to patient. No other complaints.  Repeat chest x-ray ordered for 4 hours     Time spent on the assessment of patient, gathering and interpreting data, review of medical record/patient history, personally reviewing radiographic imaging and formulation of this note. With greater than 50% spent in personal discussion with patient/ family.  Time: 15 minutes    Lin Pritchett, APRN-CNP

## 2024-09-16 ENCOUNTER — PATIENT OUTREACH (OUTPATIENT)
Dept: PRIMARY CARE | Facility: CLINIC | Age: 74
End: 2024-09-16
Payer: COMMERCIAL

## 2024-09-16 NOTE — PROGRESS NOTES
Discharge Facility: [facility]  Discharge Diagnosis: lung nodule, history of breast cancer, s/p thoracotomy  Admission Date: 9/12/24  Discharge Date:  9/13/24    PCP Appointment Date: 9/23/2024  Specialist Appointment Date: 10/1/2024 - thoracic surgery  Hospital Encounter and Summary Linked: Yes  See discharge assessment below for further details     Engagement  Call Start Time: 1233 (9/16/2024 12:39 PM)    Medications  Medications reviewed with patient/caregiver?: Yes (9/16/2024 12:39 PM)  Is the patient having any side effects they believe may be caused by any medication additions or changes?: No (9/16/2024 12:39 PM)  Does the patient have all medications ordered at discharge?: Yes (9/16/2024 12:39 PM)  Care Management Interventions: No intervention needed (9/16/2024 12:39 PM)  Prescription Comments: Reviewed new medications: oxycodone, acetaminophen, polyethylene glycol, sennosides (9/16/2024 12:39 PM)  Is the patient taking all medications as directed (includes completed medication regime)?: Yes (9/16/2024 12:39 PM)  Care Management Interventions: Provided patient education (9/16/2024 12:39 PM)  Medication Comments: See Medication  List.  Reviewed with patient. (9/16/2024 12:39 PM)    Appointments  Does the patient have a primary care provider?: Yes (9/16/2024 12:39 PM)  Care Management Interventions: Verified appointment date/time/provider (9/16/2024 12:39 PM)  Has the patient kept scheduled appointments due by today?: Not applicable (9/16/2024 12:39 PM)  Care Management Interventions: Advised patient to keep appointment (9/16/2024 12:39 PM)    Self Management  What is the home health agency?: n/a (9/16/2024 12:39 PM)  Has home health visited the patient within 72 hours of discharge?: Not applicable (9/16/2024 12:39 PM)  What Durable Medical Equipment (DME) was ordered?: n/a (9/16/2024 12:39 PM)  Has all Durable Medical Equipment (DME) been delivered?: No (9/16/2024 12:39 PM)    Patient  "Teaching  Does the patient have access to their discharge instructions?: Yes (9/16/2024 12:39 PM)  Care Management Interventions: Reviewed instructions with patient (9/16/2024 12:39 PM)  What is the patient's perception of their health status since discharge?: Improving (9/16/2024 12:39 PM)  Is the patient/caregiver able to teach back the hierarchy of who to call/visit for symptoms/problems? PCP, Specialist, Home Health nurse, Urgent Care, ED, 911: Yes (9/16/2024 12:39 PM)  Patient/Caregiver Education Comments: Confirmed patient is using \"breathing apparatus\" that she was sent home with, she states she has no questions regarding how to use this.  Reviewed signs and symptoms to watch for and when to call her doctor. (9/16/2024 12:39 PM)    Wrap Up  Is the patient/caregiver familiar with Advance Care Planning?: Yes (9/16/2024 12:39 PM)  Would the patient like more information on Advance Care Planning?: No (9/16/2024 12:39 PM)  Wrap Up Additional Comments: Successful transition of care outreach with patient. Patient reports doing well at home since discharge. She is having some discomfort and some shortness of breath, but reports it is improving. New meds/changes reviewed with patient during outreach. Patient denies further discharge questions/concerns/needs at time of outreach call. Emphasized that follow up appts are needed after discharge with PCP and reviewed needed follow ups with any specialties to assess response to treatment from hospitalization. Patient has follow up scheduled with thoracic surgery. Patient aware of my availability for non-emergent concerns. Contact information provided to the patient. (9/16/2024 12:39 PM)       "

## 2024-09-17 ENCOUNTER — APPOINTMENT (OUTPATIENT)
Dept: PRIMARY CARE | Facility: CLINIC | Age: 74
End: 2024-09-17
Payer: COMMERCIAL

## 2024-09-17 LAB
LABORATORY COMMENT REPORT: NORMAL
Lab: NORMAL
PATH REPORT.COMMENTS IMP SPEC: NORMAL
PATH REPORT.FINAL DX SPEC: NORMAL
PATH REPORT.GROSS SPEC: NORMAL
PATH REPORT.RELEVANT HX SPEC: NORMAL
PATH REPORT.TOTAL CANCER: NORMAL

## 2024-09-19 DIAGNOSIS — Z79.4 TYPE 2 DIABETES MELLITUS WITH OTHER SPECIFIED COMPLICATION, WITH LONG-TERM CURRENT USE OF INSULIN: ICD-10-CM

## 2024-09-19 DIAGNOSIS — E11.69 TYPE 2 DIABETES MELLITUS WITH OTHER SPECIFIED COMPLICATION, WITH LONG-TERM CURRENT USE OF INSULIN: ICD-10-CM

## 2024-09-19 RX ORDER — LINAGLIPTIN 5 MG/1
5 TABLET, FILM COATED ORAL DAILY
Qty: 90 TABLET | Refills: 1 | Status: SHIPPED | OUTPATIENT
Start: 2024-09-19

## 2024-09-20 ENCOUNTER — APPOINTMENT (OUTPATIENT)
Dept: PRIMARY CARE | Facility: CLINIC | Age: 74
End: 2024-09-20
Payer: COMMERCIAL

## 2024-09-20 DIAGNOSIS — E03.9 HYPOTHYROIDISM, UNSPECIFIED TYPE: ICD-10-CM

## 2024-09-20 DIAGNOSIS — I50.33 ACUTE ON CHRONIC DIASTOLIC CONGESTIVE HEART FAILURE: Primary | ICD-10-CM

## 2024-09-20 DIAGNOSIS — J44.9 CHRONIC OBSTRUCTIVE PULMONARY DISEASE, UNSPECIFIED COPD TYPE (MULTI): ICD-10-CM

## 2024-09-20 DIAGNOSIS — D50.9 IRON DEFICIENCY ANEMIA, UNSPECIFIED IRON DEFICIENCY ANEMIA TYPE: ICD-10-CM

## 2024-09-20 DIAGNOSIS — I10 PRIMARY HYPERTENSION: ICD-10-CM

## 2024-09-20 DIAGNOSIS — E87.6 HYPOKALEMIA: ICD-10-CM

## 2024-09-20 DIAGNOSIS — I10 BENIGN ESSENTIAL HYPERTENSION: ICD-10-CM

## 2024-09-20 DIAGNOSIS — E08.21 DIABETES MELLITUS DUE TO UNDERLYING CONDITION, CONTROLLED, WITH DIABETIC NEPHROPATHY, WITHOUT LONG-TERM CURRENT USE OF INSULIN: ICD-10-CM

## 2024-09-20 PROCEDURE — 80053 COMPREHEN METABOLIC PANEL: CPT

## 2024-09-20 PROCEDURE — 85027 COMPLETE CBC AUTOMATED: CPT

## 2024-09-21 LAB
ALBUMIN SERPL BCP-MCNC: 3.9 G/DL (ref 3.4–5)
ALP SERPL-CCNC: 90 U/L (ref 33–136)
ALT SERPL W P-5'-P-CCNC: 13 U/L (ref 7–45)
ANION GAP SERPL CALC-SCNC: 20 MMOL/L (ref 10–20)
AST SERPL W P-5'-P-CCNC: 15 U/L (ref 9–39)
BILIRUB SERPL-MCNC: 0.3 MG/DL (ref 0–1.2)
BUN SERPL-MCNC: 9 MG/DL (ref 6–23)
CALCIUM SERPL-MCNC: 9.5 MG/DL (ref 8.6–10.6)
CHLORIDE SERPL-SCNC: 99 MMOL/L (ref 98–107)
CO2 SERPL-SCNC: 27 MMOL/L (ref 21–32)
CREAT SERPL-MCNC: 1.07 MG/DL (ref 0.5–1.05)
EGFRCR SERPLBLD CKD-EPI 2021: 55 ML/MIN/1.73M*2
ERYTHROCYTE [DISTWIDTH] IN BLOOD BY AUTOMATED COUNT: 15.5 % (ref 11.5–14.5)
GLUCOSE SERPL-MCNC: 99 MG/DL (ref 74–99)
HCT VFR BLD AUTO: 44.2 % (ref 36–46)
HGB BLD-MCNC: 13.6 G/DL (ref 12–16)
MCH RBC QN AUTO: 30.2 PG (ref 26–34)
MCHC RBC AUTO-ENTMCNC: 30.8 G/DL (ref 32–36)
MCV RBC AUTO: 98 FL (ref 80–100)
NRBC BLD-RTO: 0 /100 WBCS (ref 0–0)
PLATELET # BLD AUTO: 402 X10*3/UL (ref 150–450)
POTASSIUM SERPL-SCNC: 3.6 MMOL/L (ref 3.5–5.3)
PROT SERPL-MCNC: 7.1 G/DL (ref 6.4–8.2)
RBC # BLD AUTO: 4.5 X10*6/UL (ref 4–5.2)
SODIUM SERPL-SCNC: 142 MMOL/L (ref 136–145)
WBC # BLD AUTO: 14.9 X10*3/UL (ref 4.4–11.3)

## 2024-09-23 ENCOUNTER — APPOINTMENT (OUTPATIENT)
Dept: PRIMARY CARE | Facility: CLINIC | Age: 74
End: 2024-09-23
Payer: COMMERCIAL

## 2024-09-23 VITALS — RESPIRATION RATE: 12 BRPM | SYSTOLIC BLOOD PRESSURE: 102 MMHG | DIASTOLIC BLOOD PRESSURE: 62 MMHG | HEART RATE: 72 BPM

## 2024-09-23 DIAGNOSIS — J84.10 PULMONARY FIBROSIS (MULTI): ICD-10-CM

## 2024-09-23 DIAGNOSIS — E07.9 THYROID MASS: ICD-10-CM

## 2024-09-23 DIAGNOSIS — M10.9 GOUT, UNSPECIFIED: ICD-10-CM

## 2024-09-23 PROCEDURE — 3050F LDL-C >= 130 MG/DL: CPT | Performed by: INTERNAL MEDICINE

## 2024-09-23 PROCEDURE — 99495 TRANSJ CARE MGMT MOD F2F 14D: CPT | Performed by: INTERNAL MEDICINE

## 2024-09-23 PROCEDURE — 1036F TOBACCO NON-USER: CPT | Performed by: INTERNAL MEDICINE

## 2024-09-23 PROCEDURE — 3074F SYST BP LT 130 MM HG: CPT | Performed by: INTERNAL MEDICINE

## 2024-09-23 PROCEDURE — 1159F MED LIST DOCD IN RCRD: CPT | Performed by: INTERNAL MEDICINE

## 2024-09-23 PROCEDURE — 4010F ACE/ARB THERAPY RXD/TAKEN: CPT | Performed by: INTERNAL MEDICINE

## 2024-09-23 PROCEDURE — 1111F DSCHRG MED/CURRENT MED MERGE: CPT | Performed by: INTERNAL MEDICINE

## 2024-09-23 PROCEDURE — 3078F DIAST BP <80 MM HG: CPT | Performed by: INTERNAL MEDICINE

## 2024-09-23 PROCEDURE — 3044F HG A1C LEVEL LT 7.0%: CPT | Performed by: INTERNAL MEDICINE

## 2024-09-23 RX ORDER — COLCHICINE 0.6 MG/1
0.6 TABLET ORAL DAILY
Qty: 90 TABLET | Refills: 1 | Status: SHIPPED | OUTPATIENT
Start: 2024-09-23

## 2024-09-23 NOTE — ASSESSMENT & PLAN NOTE
Pt underwent recent Thoracoscopy (Right Lung Wedge Resection). Preliminary pathology results showed nodules to be benign, likely hamartoma. There was evidence of pulmonary fibrosis as well.     Referred to pulmonologist regarding pulmonary interstitial fibrosis and further management.

## 2024-09-23 NOTE — PROGRESS NOTES
Subjective   Chief complaint: Serjio Nunes is a 74 y.o. female who presents for Hospital Follow-up (Pt is being seen for hospital follow up ).    HPI:  Pt is here for a follow up following her recent hospital stay and surgery. She recently underwent surgery on 09/12 for pulmonary nodule resections in the right lung. Pt states that the surgeon believed they appeared benign but is still waiting on the biopsy results. Patient overall feels well and does not report any other concerns.          Objective   /62   Pulse 72   Resp 12   Physical Exam  Vitals reviewed.   Constitutional:       Appearance: Normal appearance.   HENT:      Head: Normocephalic.      Right Ear: External ear normal.      Left Ear: External ear normal.      Mouth/Throat:      Pharynx: Oropharynx is clear.   Eyes:      Extraocular Movements: Extraocular movements intact.   Cardiovascular:      Rate and Rhythm: Normal rate and regular rhythm.      Heart sounds: Normal heart sounds. No murmur heard.     No gallop.   Pulmonary:      Breath sounds: Normal breath sounds. No wheezing, rhonchi or rales.   Skin:     General: Skin is dry.   Neurological:      General: No focal deficit present.      Mental Status: She is alert.   Psychiatric:         Mood and Affect: Mood normal.         Behavior: Behavior normal.         Thought Content: Thought content normal.         Judgment: Judgment normal.         I have reviewed and reconciled the medication list with the patient today.   Current Outpatient Medications:     amLODIPine (Norvasc) 10 mg tablet, TAKE 1 TABLET BY MOUTH EVERY DAY, Disp: 90 tablet, Rfl: 3    aspirin 81 mg EC tablet, Take 1 tablet (81 mg) by mouth once daily., Disp: , Rfl:     busPIRone (Buspar) 5 mg tablet, TAKE 1 TABLET (5 MG) BY MOUTH 2 TIMES A DAY AS NEEDED (WHEN FEELING ANXIOUS)., Disp: 180 tablet, Rfl: 3    carvedilol (Coreg) 25 mg tablet, TAKE 1 TABLET BY MOUTH TWICE A DAY, Disp: 180 tablet, Rfl: 3    chlorhexidine (Peridex)  0.12 % solution, 15 ml swish and spit for 30 seconds night prior to surgery and morning of surgery, Disp: 473 mL, Rfl: 0    cholecalciferol (Vitamin D-3) 50 MCG (2000 UT) tablet, Take 1 tablet (2,000 Units) by mouth once daily., Disp: , Rfl:     colchicine, gout, 0.6 mg tablet, TAKE 1 TABLET BY MOUTH EVERY DAY, Disp: 90 tablet, Rfl: 1    fluocinonide 0.05 % cream, Apply topically 2 times a day. APPLY SPARINGLY TO AFFECTED AREA, Disp: , Rfl:     fluticasone (Flonase) 50 mcg/actuation nasal spray, Administer 1 spray into each nostril once daily., Disp: , Rfl:     fluticasone propion-salmeteroL (Advair Diskus) 250-50 mcg/dose diskus inhaler, Inhale 1 puff 2 times a day., Disp: , Rfl:     FreeStyle Jyacob 14 Day Sensor kit, APPLY SENSOR TO BACK UPPER ARM REMOVE AND REPLACE EVERY 14 DAYS USE WITH DEVICE, Disp: 10 each, Rfl: 3    furosemide (Lasix) 40 mg tablet, Take 0.5 tablets (20 mg) by mouth once daily., Disp: 30 tablet, Rfl: 3    Klor-Con M10 10 mEq ER tablet, TAKE 1 TABLET BY MOUTH EVERY DAY, Disp: 90 tablet, Rfl: 3    losartan (Cozaar) 100 mg tablet, Take 0.5 tablets (50 mg) by mouth once daily., Disp: 90 tablet, Rfl: 0    miconazole (Micotin) 2 % cream, miconazole nitrate 2 % topical cream  APPLY SPARINGLY TO AFFECTED AREA TWICE A DAY, Disp: , Rfl:     nitroglycerin (Nitrostat) 0.4 mg SL tablet, Place 1 tablet (0.4 mg) under the tongue every 5 minutes if needed for chest pain., Disp: 90 tablet, Rfl: 11    omeprazole OTC (PriLOSEC OTC) 20 mg EC tablet, Take 1 tablet (20 mg) by mouth once daily. Do not crush, chew, or split., Disp: 30 tablet, Rfl: 11    tiZANidine (Zanaflex) 4 mg tablet, TAKE 1 TABLET (4 MG) BY MOUTH EVERY 6 HOURS IF NEEDED FOR MUSCLE SPASMS, Disp: 90 tablet, Rfl: 1    Tradjenta 5 mg tablet, TAKE 1 TABLET BY MOUTH EVERY DAY AS DIRECTED, Disp: 90 tablet, Rfl: 1     Imaging:  XR chest 1 view    Result Date: 9/13/2024  Interpreted By:  Lawanda Chawla, STUDY: XR CHEST 1 VIEW;  9/13/2024 12:14 pm    INDICATION: Signs/Symptoms:s/p r wedge resection ct removal.   COMPARISON: 09/13/2024 at 4:12 a.m.   ACCESSION NUMBER(S): [id number]   ORDERING CLINICIAN: ZAMZAM REILLY   FINDINGS: Previously seen right-sided chest tube has been removed. There is a small right apical pneumothorax   Patchy densities again seen in the right midlung. There may be a small right pleural effusion. Right-sided subcutaneous emphysema is again seen.   The heart is enlarged.   Postsurgical changes are noted in the cervical spine       Status post right chest tube removal with small right apical pneumothorax.   Patchy density in the right lung which is unchanged.   Cardiomegaly.   Subcutaneous emphysema       MACRO: None   Signed by: Lawanda Chawla 9/13/2024 1:02 PM Dictation workstation:   [id number]    XR chest 1 view    Result Date: 9/13/2024  Interpreted By:  Angelica Alvares, STUDY: XR CHEST 1 VIEW;  9/13/2024 4:32 am   INDICATION: Signs/Symptoms:s/p VATS CT in place.   COMPARISON: 09/12/2024   ACCESSION NUMBER(S): [id number]   ORDERING CLINICIAN: ZAMZAM REILLY   FINDINGS:     CARDIOMEDIASTINAL SILHOUETTE: Cardiomediastinal silhouette is normal in size and configuration.   LUNGS: Stable position of right chest tube with the tip at the right apex. Stable right midlung airspace opacity adjacent to the sutures. Mildly increased right basilar bandlike opacity, likely atelectasis. The left lung is clear. No significant pleural effusion. No pneumothorax. Decreased soft tissue gas in the right chest wall.   ABDOMEN: No remarkable upper abdominal findings.   BONES: No acute osseous abnormality.       Stable position of right chest tube. No pneumothorax seen.   Stable right midlung airspace opacity may represent pneumonia in the appropriate clinical setting.   Increased mild right basilar atelectasis.   MACRO: None   Signed by: Angelica Alvares 9/13/2024 4:51 AM Dictation workstation:   [id number]    XR chest 1 view    Result Date:  9/12/2024  Interpreted By:  Chloe Hawk, STUDY: XR CHEST 1 VIEW;  9/12/2024 10:35 am   INDICATION: Signs/Symptoms:s/p vats  CT in place.     COMPARISON: 11/07/2022   ACCESSION NUMBER(S): [id number]   ORDERING CLINICIAN: ZAMZAM REILLY   FINDINGS: Artifact from overlying monitoring leads noted. Right chest tube in place with tip at the medial apex. Ill-defined right perihilar opacity. No pneumothorax. Right chest wall emphysema. Left lung is clear. The cardiac silhouette is within normal limits for size. Partially included cervical spine orthopedic hardware.       Right chest tube in place. Right perihilar infiltrate. No pneumothorax.   MACRO: None.   Signed by: Chloe Hawk 9/12/2024 10:53 AM Dictation workstation:   [id number]    US guided fine percutaneous aspiration    Result Date: 8/26/2024  Interpreted By:  Carlin Ocasio, STUDY: US GUIDED FINE PERCUTANEOUS ASPIRATION; 8/26/20243:42 pm   INDICATION: Signs/Symptoms:thyroid nodules.   COMPARISON: US thyroid 8/7/2024.   ACCESSION NUMBER(S): [id number]   ORDERING CLINICIAN: SHUBHAM HURTADO   TECHNIQUE: INTERVENTIONALIST(S): Carlin Ocasio   CONSENT: The patient/patient's POA/next of kin was informed of the nature of the proposed procedure. The purposes, alternatives, risks, and benefits were explained and discussed. All questions were answered and consent was obtained.   SEDATION: No IV sedation was given   MEDICATION/CONTRAST: Lidocaine was administered for local anesthesia.   TIME OUT: A time out was performed immediately prior to procedure start with the interventional team, correctly identifying the patient name, date of birth, MRN, procedure, anatomy (including marking of site and side), patient position, procedure consent form, relevant laboratory and imaging test results, antibiotic administration, safety precautions, and procedure-specific equipment needs.   COMPLICATIONS: No immediate adverse events identified.   FINDINGS: The patient was placed in the  supine position with the neck in an extended position. Ultrasound of the thyroid was performed and demonstrated the dominant nodule in the left lobe of the thyroid, similar to the prior study. These 3.7 cm mid and 2.6 cm inferior left lobe nodules were selected for biopsy. The patient was prepped and draped in normal sterile fashion.   Subcutaneous lidocaine was utilized for local anesthesia. Subsequently, four passes were made into the previously mentioned nodule(s) using 25 gauge needles under direct ultrasound guidance. Images document the tip of the needle within the nodule(s). Slides were prepared and sent to pathology for evaluation. There were no immediate complications. A sterile dressing was applied at the puncture site.       Uneventful ultrasound-guided fine-needle aspiration biopsy of 3.7 cm mid and 2.6 cm inferior left lobe thyroid nodules, as detailed above.   I performed the procedure and dictated this report.   Performed and dictated at [facility] [place].   Signed by: Carlin Ocasio 8/26/2024 3:43 PM Dictation workstation:   [id number]       Labs reviewed:    Lab Results   Component Value Date    WBC 14.9 (H) 09/20/2024    HGB 13.6 09/20/2024    HCT 44.2 09/20/2024     09/20/2024    CHOL 234 (H) 08/05/2024    TRIG 249 (H) 08/05/2024    HDL 34.6 08/05/2024    ALT 13 09/20/2024    AST 15 09/20/2024     09/20/2024    K 3.6 09/20/2024    CL 99 09/20/2024    CREATININE 1.07 (H) 09/20/2024    BUN 9 09/20/2024    CO2 27 09/20/2024    TSH 2.64 08/05/2024    INR 1.0 09/04/2024    HGBA1C 6.2 (H) 08/05/2024       Assessment/Plan   Problem List Items Addressed This Visit          Endocrine/Metabolic    Thyroid mass     Thyroid FNA results were discussed with patient.     Will repeat thyroid ultrasound in 4 months.             Pulmonary and Pneumonias    Pulmonary fibrosis (Multi)     Pt underwent recent Thoracoscopy (Right Lung Wedge Resection). Preliminary pathology  results showed nodules to be benign, likely hamartoma. There was evidence of pulmonary fibrosis as well.     Referred to pulmonologist regarding pulmonary interstitial fibrosis and further management.           Other Visit Diagnoses       Gout, unspecified                Continue current medications as listed  Follow up in 4 months following repeat thyroid ultrasound.

## 2024-09-24 ENCOUNTER — OFFICE VISIT (OUTPATIENT)
Dept: CARDIOLOGY | Facility: CLINIC | Age: 74
End: 2024-09-24
Payer: COMMERCIAL

## 2024-09-24 VITALS
BODY MASS INDEX: 30.82 KG/M2 | WEIGHT: 174 LBS | DIASTOLIC BLOOD PRESSURE: 69 MMHG | SYSTOLIC BLOOD PRESSURE: 121 MMHG | HEART RATE: 76 BPM | OXYGEN SATURATION: 95 %

## 2024-09-24 DIAGNOSIS — I73.9 PAD (PERIPHERAL ARTERY DISEASE) (CMS-HCC): ICD-10-CM

## 2024-09-24 PROCEDURE — 3044F HG A1C LEVEL LT 7.0%: CPT | Performed by: INTERNAL MEDICINE

## 2024-09-24 PROCEDURE — 1125F AMNT PAIN NOTED PAIN PRSNT: CPT | Performed by: INTERNAL MEDICINE

## 2024-09-24 PROCEDURE — 99214 OFFICE O/P EST MOD 30 MIN: CPT | Performed by: INTERNAL MEDICINE

## 2024-09-24 PROCEDURE — 1160F RVW MEDS BY RX/DR IN RCRD: CPT | Performed by: INTERNAL MEDICINE

## 2024-09-24 PROCEDURE — 4010F ACE/ARB THERAPY RXD/TAKEN: CPT | Performed by: INTERNAL MEDICINE

## 2024-09-24 PROCEDURE — 3050F LDL-C >= 130 MG/DL: CPT | Performed by: INTERNAL MEDICINE

## 2024-09-24 PROCEDURE — 3074F SYST BP LT 130 MM HG: CPT | Performed by: INTERNAL MEDICINE

## 2024-09-24 PROCEDURE — 3078F DIAST BP <80 MM HG: CPT | Performed by: INTERNAL MEDICINE

## 2024-09-24 PROCEDURE — 1111F DSCHRG MED/CURRENT MED MERGE: CPT | Performed by: INTERNAL MEDICINE

## 2024-09-24 PROCEDURE — 1159F MED LIST DOCD IN RCRD: CPT | Performed by: INTERNAL MEDICINE

## 2024-09-24 PROCEDURE — 1036F TOBACCO NON-USER: CPT | Performed by: INTERNAL MEDICINE

## 2024-09-24 RX ORDER — ATORVASTATIN CALCIUM 40 MG/1
40 TABLET, FILM COATED ORAL DAILY
Qty: 30 TABLET | Refills: 11 | Status: SHIPPED | OUTPATIENT
Start: 2024-09-24 | End: 2025-09-24

## 2024-09-24 ASSESSMENT — PAIN SCALES - GENERAL: PAINLEVEL: 7

## 2024-09-24 NOTE — PATIENT INSTRUCTIONS
I want you to start taking atorvastatin 40 mg each night at bedtime.    I would like to talk to Dr. Vo about getting you in for a procedure to open up your leg blood vessel. We will need to do a pre-medication with steroids and Benadryl to prevent a contrast dye reaction.    Once I talk with him we'll figure out about getting you scheduled.    Should you have questions, please do not hesitate to call my office at [phone], or you can reach me on [employer] if you have signed up for it. If you have urgent concerns, call the office, do not use [employer].

## 2024-09-24 NOTE — PROGRESS NOTES
Referred by Raymond Carnes MD      History of Present Illness:  Serjio Nunes is a/an 74 y.o. with HTN, hyperlipidemia, tobacco use referred for lower extremity PAD diagnosed on KWADWO after abnormal pulse exam.    She reports lifestyle-limiting claudication right worse than left along with rest pain of the right foot that improves if she dangles her foot or gets up to move around. Her daughter who is here at this visit notes that the patient has severely curtailed her walking ability and physical activity.     Patient also notes that she often but not always gets abdominal pain after eating; dull burning pain. She is sometimes afraid to eat because of this. Reports unintentional weight loss.    No ulceration or tissue loss.    Past Medical History:   Diagnosis Date    Anemia     Anxiety     Asthma ([facility]-H[facility])     Bipolar disorder (Multi)     stable    Breast cancer (Multi) 2013    left breast cancer s/p lumpectomy; chemotherapy and radiation    Cardiology follow-up encounter 03/19/2024    Brody MOON MD    CHF (congestive heart failure) (Multi)     stable    Chronic venous insufficiency     status post vein ablation    COPD (chronic obstructive pulmonary disease) (Multi)     no O2    Coronary artery disease     , [facility] with non-obstructive CAD on 4/8/16    Diverticulosis     DVT (deep venous thrombosis) (Multi) 1972    right leg, provoked w/ pregnancy    Fibromyalgia     GERD (gastroesophageal reflux disease)     under control    Gout     no recent acute    Hyperlipidemia     Hypertension     Insomnia     Lumbar radiculopathy     Lung nodule     Plan: Thoracoscopy; Right Lung Wedge Resection 9/12/24    Nonrheumatic aortic (valve) insufficiency     Osteoarthritis     RLS (restless legs syndrome)     Sleep apnea     unable tolerate CPAP - sleep with multiple pillows    TIA (transient ischemic attack) 2015    no residual - no recurrence    Type 2 diabetes mellitus (Multi)     Vitamin D deficiency      Past Surgical  History:   Procedure Laterality Date    BREAST BIOPSY      BREAST LUMPECTOMY Left 2013    BUNIONECTOMY Bilateral     CARDIAC CATHETERIZATION  2016    CERVICAL FUSION      FOOT SURGERY Right     screw great toe    HERNIA REPAIR      ventral    HYSTERECTOMY      ROTATOR CUFF REPAIR Left     SOFT TISSUE BIOPSY  2024    Thyroid    VENOUS ABLATION       Social History     Tobacco Use    Smoking status: Former     Current packs/day: 0.00     Average packs/day: 0.5 packs/day for 48.0 years (24.0 ttl pk-yrs)     Types: Cigarettes     Start date:      Quit date:      Years since quittin.7    Smokeless tobacco: Never   Vaping Use    Vaping status: Never Used   Substance Use Topics    Alcohol use: Not Currently     Comment: rare    Drug use: Yes     Types: Marijuana     Comment: Smokes daily no other drug hx     Family History   Problem Relation Name Age of Onset    Cancer Mother          COLORECTAL    Aneurysm Mother      Stroke Mother      Arthritis Father      Hypertension Father      Aneurysm Father       Current Outpatient Medications   Medication Sig Dispense Refill    amLODIPine (Norvasc) 10 mg tablet TAKE 1 TABLET BY MOUTH EVERY DAY 90 tablet 3    aspirin 81 mg EC tablet Take 1 tablet (81 mg) by mouth once daily.      busPIRone (Buspar) 5 mg tablet TAKE 1 TABLET (5 MG) BY MOUTH 2 TIMES A DAY AS NEEDED (WHEN FEELING ANXIOUS). 180 tablet 3    carvedilol (Coreg) 25 mg tablet TAKE 1 TABLET BY MOUTH TWICE A  tablet 3    chlorhexidine (Peridex) 0.12 % solution 15 ml swish and spit for 30 seconds night prior to surgery and morning of surgery 473 mL 0    cholecalciferol (Vitamin D-3) 50 MCG (2000 UT) tablet Take 1 tablet (2,000 Units) by mouth once daily.      colchicine 0.6 mg tablet Take 1 tablet (0.6 mg) by mouth once daily. 90 tablet 1    fluocinonide 0.05 % cream Apply topically 2 times a day. APPLY SPARINGLY TO AFFECTED AREA      fluticasone (Flonase) 50 mcg/actuation nasal spray  Administer 1 spray into each nostril once daily.      fluticasone propion-salmeteroL (Advair Diskus) 250-50 mcg/dose diskus inhaler Inhale 1 puff 2 times a day.      FreeStyle Jaycob 14 Day Sensor kit APPLY SENSOR TO BACK UPPER ARM REMOVE AND REPLACE EVERY 14 DAYS USE WITH DEVICE 10 each 3    furosemide (Lasix) 40 mg tablet Take 0.5 tablets (20 mg) by mouth once daily. 30 tablet 3    Klor-Con M10 10 mEq ER tablet TAKE 1 TABLET BY MOUTH EVERY DAY 90 tablet 3    losartan (Cozaar) 100 mg tablet Take 0.5 tablets (50 mg) by mouth once daily. 90 tablet 0    miconazole (Micotin) 2 % cream miconazole nitrate 2 % topical cream   APPLY SPARINGLY TO AFFECTED AREA TWICE A DAY      nitroglycerin (Nitrostat) 0.4 mg SL tablet Place 1 tablet (0.4 mg) under the tongue every 5 minutes if needed for chest pain. 90 tablet 11    omeprazole OTC (PriLOSEC OTC) 20 mg EC tablet Take 1 tablet (20 mg) by mouth once daily. Do not crush, chew, or split. 30 tablet 11    tiZANidine (Zanaflex) 4 mg tablet TAKE 1 TABLET (4 MG) BY MOUTH EVERY 6 HOURS IF NEEDED FOR MUSCLE SPASMS 90 tablet 1    Tradjenta 5 mg tablet TAKE 1 TABLET BY MOUTH EVERY DAY AS DIRECTED 90 tablet 1     No current facility-administered medications for this visit.       Physical Examination:  Blood pressure 121/69, pulse 76, weight 78.9 kg (174 lb), SpO2 95%.  General: well-developed, well-nourished, no distress  Head: normocephalic, atraumatic  Eyes: PERRL, anicteric sclerae, no conjunctival injection  ENT: normal oropharynx  Neck: supple, no carotid bruits, no JVD  Lungs: normal respiratory effort, clear to auscultation bilaterally  Heart: regular, normal S1 and S2, no murmurs, rubs or gallops  Abdomen: normal active bowel sounds, soft, non-distended, non-tender  Extremities: no cyanosis or clubbing  Vascular: unable to palpate foot pulses on right, but foot is warm and normal skin tone; left distal pulses 1+  Musculoskeletal: no deformities  Neurological: alert and oriented, no  gross neurological deficits  Psychological: normal mood and affect   Skin: warm and dry, no rashes or lesions        Pertinent Labs:    Pertinent Imaging:  KWADWO 7/26/2024  CONCLUSIONS:  Right Lower PVR: Evidence of moderate arterial occlusive disease in the right lower extremity at rest. Decreased digital perfusion noted. Monophasic flow is noted in the right posterior tibial artery and right dorsalis pedis artery. Multiphasic flow is noted in the right common femoral artery and right popliteal artery.  Left Lower PVR: Evidence of mild arterial occlusive disease in the left lower extremity at rest. Decreased digital perfusion noted. Monophasic flow is noted in the left posterior tibial artery. Multiphasic flow is noted in the left common femoral artery, left popliteal artery and left dorsalis pedis artery.       Diagnoses and all orders for this visit:  PAD (peripheral artery disease) (CMS-[facility])  -     Referral to Vascular Medicine  -     atorvastatin (Lipitor) 40 mg tablet; Take 1 tablet (40 mg) by mouth once daily.  Start statin, continue aspirin and ARB  Refer to Dr. Vo for likely angio right leg, mesenteric vessels as well; will need pre-med for contrast allergy    Maritza Smith MD, MS

## 2024-09-25 DIAGNOSIS — M50.20 CERVICAL DISC HERNIATION: ICD-10-CM

## 2024-09-25 RX ORDER — TIZANIDINE 4 MG/1
4 TABLET ORAL EVERY 6 HOURS PRN
Qty: 90 TABLET | Refills: 1 | Status: SHIPPED | OUTPATIENT
Start: 2024-09-25 | End: 2024-11-24

## 2024-09-27 ENCOUNTER — OFFICE VISIT (OUTPATIENT)
Dept: CARDIOLOGY | Facility: CLINIC | Age: 74
End: 2024-09-27
Payer: COMMERCIAL

## 2024-09-27 VITALS
WEIGHT: 178 LBS | OXYGEN SATURATION: 98 % | SYSTOLIC BLOOD PRESSURE: 112 MMHG | DIASTOLIC BLOOD PRESSURE: 72 MMHG | HEIGHT: 63 IN | HEART RATE: 61 BPM | BODY MASS INDEX: 31.54 KG/M2

## 2024-09-27 DIAGNOSIS — I73.9 PAD (PERIPHERAL ARTERY DISEASE) (CMS-HCC): Primary | ICD-10-CM

## 2024-09-27 DIAGNOSIS — I70.221 CRITICAL LIMB ISCHEMIA OF RIGHT LOWER EXTREMITY (MULTI): ICD-10-CM

## 2024-09-27 PROCEDURE — 99215 OFFICE O/P EST HI 40 MIN: CPT | Performed by: HOSPITALIST

## 2024-09-27 RX ORDER — DIPHENHYDRAMINE HCL 50 MG
CAPSULE ORAL
Qty: 1 CAPSULE | Refills: 0 | Status: SHIPPED | OUTPATIENT
Start: 2024-09-27 | End: 2024-10-07

## 2024-09-27 RX ORDER — PREDNISONE 50 MG/1
TABLET ORAL
Qty: 3 TABLET | Refills: 0 | Status: SHIPPED | OUTPATIENT
Start: 2024-09-27 | End: 2024-10-07

## 2024-09-27 ASSESSMENT — COLUMBIA-SUICIDE SEVERITY RATING SCALE - C-SSRS
6. HAVE YOU EVER DONE ANYTHING, STARTED TO DO ANYTHING, OR PREPARED TO DO ANYTHING TO END YOUR LIFE?: NO
2. HAVE YOU ACTUALLY HAD ANY THOUGHTS OF KILLING YOURSELF?: NO
1. IN THE PAST MONTH, HAVE YOU WISHED YOU WERE DEAD OR WISHED YOU COULD GO TO SLEEP AND NOT WAKE UP?: NO

## 2024-09-27 NOTE — PATIENT INSTRUCTIONS
We lucio schedule you for a right leg angiogram at [facility] next 1-2 weeks. Will work on the left leg at a later occasion.     Will premedicate you for your contrast allergy.     Please stop smoking completely.     Please call us at [phone] if you have any questions.

## 2024-09-27 NOTE — LETTER
September 27, 2024     Maritza Smith MD, MS  [address] [facility] [id number]    Patient: Serjio Nunes   YOB: 1950   Date of Visit: 9/27/2024       Dear Dr. Maritza Smith MD, MS:    Thank you for referring Serjio Nunes to me for evaluation. Below are my notes for this consultation.  If you have questions, please do not hesitate to call me. I look forward to following your patient along with you.       Sincerely,     Keyla Vo MD      CC: No Recipients  ______________________________________________________________________________________    Subjective  Serjio Nunes is a 74 y.o. female with PMH of HTN, HLD, obesity, BILL, chronic diastolic HF, anaphylaxis to contrast, and other comorbidities, who was referred by Dr. Smith for evaluation of PAD.  Patient complains of pain and numbness in both legs, right worse and all the time including at rest, left only with walking. Pain and numbness from right knee and down to the toes, and left lower leg to the toes. Pain also involves the left thigh with walking. Patient has not been able to sleep well at night due to right leg symptoms which improve with dangling the leg. Symptoms have been going on for 6 months.  Patient reported 20 lbs weight loss in July but she gained it back per her report and she no longer has pain with meals.     Patient follows up with Dr. Cantu for cardiac care.     Patient is on ASA 81, Atorvastatin 40, and other meds.     Cr 1.07, Hgb wnl.    PVR testing from 7/26/2024:  Right KWADWO 1.22, 0.71, 0.66, 0.7/0.66, right TBI 0.22, left KWADWO 0.99, 0.72, 0.70, 0.76/0.81 and left TBI of 0.41. There is 34 mmHg difference between the right and the left thighs indicative of left iliac disease, there is also evidence of right SFA disease with 74 mmHg across from right high to low thigh.       Review of Systems  ROS is negative other than in HPI.      Objective  Physical Exam  General: NAD  HEENT: IEOM, PERRL   Neck: No  JVD or carotid bruit  Lungs: CTAB  Heart: RRR, normal S1 and S2, no loud murmurs  Abdomen: Soft, nontender, positive bowel sounds  Extremities: No edema  Neurologic: No FND  Psychiatric: Normal mood and affect    Assessment/Plan  1-PAD/CLI:  -Severe claudication of both legs and right leg rest pain, see HPI for details.   -PVR testing from 7/26/2024: Right KWADWO 1.22, 0.71, 0.66, 0.7/0.66, right TBI 0.22, left KWADWO 0.99, 0.72, 0.70, 0.76/0.81 and left TBI of 0.41. There is 34 mmHg difference between the right and the left thighs indicative of left iliac disease, there is also evidence of right SFA disease with 74 mmHg across from right high to low thigh.   -Based on the PVR, I am little surprised that patient has right leg rest pain as the right high thigh has a triphasic waveform indicative of intact inflow.  -Will schedule her for a right leg angiogram. Will premedicate patient for history of contrast allergy.   -Continue ASA and Atorvastatin.  -Will update Dr. Smith.     2-Weight loss:  -Patient reported 20 lbs weight loss in July but she gained it back per her report, and she no longer has pain with meals.   -No indication for mesenteric ischemia work up at this time.       Keyla Vo MD

## 2024-09-30 ENCOUNTER — APPOINTMENT (OUTPATIENT)
Dept: PHARMACY | Facility: HOSPITAL | Age: 74
End: 2024-09-30
Payer: COMMERCIAL

## 2024-09-30 DIAGNOSIS — J44.9 CHRONIC OBSTRUCTIVE PULMONARY DISEASE, UNSPECIFIED COPD TYPE (MULTI): ICD-10-CM

## 2024-09-30 DIAGNOSIS — I50.32 CHRONIC DIASTOLIC (CONGESTIVE) HEART FAILURE: Primary | Chronic | ICD-10-CM

## 2024-09-30 DIAGNOSIS — E08.21 DIABETES MELLITUS DUE TO UNDERLYING CONDITION, CONTROLLED, WITH DIABETIC NEPHROPATHY, WITHOUT LONG-TERM CURRENT USE OF INSULIN: ICD-10-CM

## 2024-09-30 NOTE — PROGRESS NOTES
Pharmacy Post-Discharge Visit    Serjio Nunes is a 74 y.o. female was referred to Clinical Pharmacy Team to complete a post-discharge medication optimization and monitoring visit.  The patient was referred for their COPD, Diabetes, and Congestive Heart Failure.    Pt is here for First appointment.     Admission Date: 9/12/24  Discharge Date: 9/13/24    Referring Provider/PCP: Raymond Carnes MD  Last Visit: 9/23/24  Next visit: 12/19/24    Subjective   HPI  DIABETES MELLITUS TYPE 2:    Known diabetic complications: nephropathy.  Does patient follow with Endocrinology: Not at this time  Last optometry exam: ~1 year ago  Most recent visit in Podiatry: does not recall, but does follow with one -- patient denies sores or cuts on feet today      Current diabetic medications include:  Tradjenta 5 mg daily    Adverse Effects: None    Glucose Readings:  Glucometer/CGM Type: Freestyle Jaycob 14 day  Patient tests BG multiple times per day    Current home BG readings: 110-140 throughout the day   Previous home BG readings: N/A    Any episodes of hypoglycemia? No.    Did patient treat episode of hypoglycemia appropriately? N/A  Does the patient have a prescription for ready-to-use Glucagon? Not on insulin  Does pt have proteinuria? No labs available    Pertinent PMH Review:  PMH of Pancreatitis: No  PMH of Retinopathy: No  PMH of Urinary Tract Infections: No  PMH of MTC: No     CONGESTIVE HEART FAILURE ASSESSMENT  Does patient follow with Cardiology: Yes    Date: 10/22/24    Staging  Ejection Fraction: 67% (1/12/24)  NYHA Class: Class I - No limitations to physical activity  ACC/AHA Stage: B - Structural disease without SxS    Symptom Assessment  Weight changes/edema?: No  Dyspnea?: None  Dizziness/syncope/palpitations?: No    Medication Therapy  Current Regimen (GDMT):  ARNI/ACEi/ARB: Yes - losartan 100 mg daily  Beta Blocker: Yes - carvedilol 25 mg twice daily  MRA: No  SGLT2i: No    Other therapy:  Furosemide 40 mg  daily    Previous Medications: N/A    Clarifications to above regimen: N/A   Adverse Effects: N/A     Secondary Prevention  The 10-year ASCVD risk score (Isabel OAKES, et al., 2019) is: 24.5%    Values used to calculate the score:      Age: 74 years      Sex: Female      Is Non- : Yes      Diabetic: Yes      Tobacco smoker: No      Systolic Blood Pressure: 112 mmHg      Is BP treated: Yes      HDL Cholesterol: 34.6 mg/dL      Total Cholesterol: 234 mg/dL  Aspirin 81mg? yes  Statin?: Yes  HTN?: Yes - well controlled on current regimen      COPD  Patient has been diagnosed with:  COPD and pulmonary fibrosis  Does patient see pulmonology: No  has had PFT's completed in last 2 years    Current Regimen  Duonebs prn  Albuterol HFA prn  Advair 250/50 1 puff twice daily    Clarifications to above regimen: Uses Advair seasonally  Adverse Effects: None   Appropriate technique? Yes    Symptom Management  Current symptoms:  None  Triggers: None, high humidity  Alleviating factors: inhalers    Exacerbation Hx  When was your last hospitalization for an exacerbation? November 2023  When was the last time you were treated with antibiotics and/or steroids? July 2024    Rescue Inhaler Use  How often do you use your rescue inhaler? Rarely    Immunization History:  Influenza: 2022 - due for yearly vaccination  PCV13: 2019  PPSV23: 2017  PCV20: N/A  COVID: primary series in 2021  RSV: N/A    Smoking History  She quit smoking approximately 10 years ago.    Notable Medication changes following discharge  Start: N/A  Stop: N/A  Change: N/A    Medication Reconciliation  Changed: N/A  Added: N/A  Discontinued: N/A     Drug Interactions  No relevant drug interactions were noted.    Medication System Management  Patient's preferred pharmacy: [facility]  Adherence/Organization: No issues  Affordability/Accessibility: No issues      Objective   Allergies   Allergen Reactions    Iodinated Contrast Media Anaphylaxis    Strawberry  Angioedema    Iodine Swelling    Latex Rash     Social History     Social History Narrative    Not on file      Medication Review  Current Outpatient Medications   Medication Instructions    amLODIPine (Norvasc) 10 mg tablet TAKE 1 TABLET BY MOUTH EVERY DAY    aspirin 81 mg EC tablet 1 tablet, oral, Daily    atorvastatin (LIPITOR) 40 mg, oral, Daily    busPIRone (BUSPAR) 5 mg, oral, 2 times daily PRN    carvedilol (Coreg) 25 mg tablet TAKE 1 TABLET BY MOUTH TWICE A DAY    cholecalciferol (VITAMIN D-3) 2,000 Units, oral, Daily RT    colchicine 0.6 mg, oral, Daily    diphenhydrAMINE (BENADryl) 50 mg capsule Take one capsule (50 mg) by mouth one hour before contrast medium administration    empagliflozin (JARDIANCE) 25 mg, oral, Daily    fluocinonide 0.05 % cream Topical, 2 times daily, APPLY SPARINGLY TO AFFECTED AREA    fluticasone (Flonase) 50 mcg/actuation nasal spray 1 spray, Each Nostril, Daily    fluticasone propion-salmeteroL (Advair Diskus) 250-50 mcg/dose diskus inhaler 1 puff, inhalation, 2 times daily RT    FreeStyle Jaycob 14 Day Sensor kit APPLY SENSOR TO BACK UPPER ARM REMOVE AND REPLACE EVERY 14 DAYS USE WITH DEVICE    furosemide (LASIX) 20 mg, oral, Daily    Klor-Con M10 10 mEq ER tablet 10 mEq, oral, Daily    losartan (COZAAR) 50 mg, oral, Daily    miconazole (Micotin) 2 % cream miconazole nitrate 2 % topical cream   APPLY SPARINGLY TO AFFECTED AREA TWICE A DAY    nitroglycerin (NITROSTAT) 0.4 mg, sublingual, Every 5 min PRN    omeprazole OTC (PRILOSEC OTC) 20 mg, oral, Daily, Do not crush, chew, or split.    predniSONE (Deltasone) 50 mg tablet Take one tablet (50 mg) by mouth 13 hours, 7 hours and 1 hour before contrast medium administration    tiZANidine (ZANAFLEX) 4 mg, oral, Every 6 hours PRN    Tradjenta 5 mg, oral, Daily, as directed      Vitals  BP Readings from Last 2 Encounters:   09/27/24 112/72   09/24/24 121/69     BMI Readings from Last 1 Encounters:   09/27/24 31.53 kg/m²     "  Labs  A1C  Lab Results   Component Value Date    HGBA1C 6.2 (H) 08/05/2024    HGBA1C 5.8 (H) 12/18/2023    HGBA1C 6.3 (A) 06/20/2023     BMP  Lab Results   Component Value Date    CALCIUM 9.5 09/20/2024     09/20/2024    K 3.6 09/20/2024    CO2 27 09/20/2024    CL 99 09/20/2024    BUN 9 09/20/2024    CREATININE 1.07 (H) 09/20/2024    EGFR 55 (L) 09/20/2024     LFTs  Lab Results   Component Value Date    ALT 13 09/20/2024    AST 15 09/20/2024    ALKPHOS 90 09/20/2024    BILITOT 0.3 09/20/2024     FLP  Lab Results   Component Value Date    TRIG 249 (H) 08/05/2024    CHOL 234 (H) 08/05/2024    LDLF 119 (H) 06/20/2023    LDLCALC 150 08/05/2024    HDL 34.6 08/05/2024     Urine Microalbumin  No results found for: \"MICROALBCREA\"  Wt Readings from Last 3 Encounters:   09/27/24 80.7 kg (178 lb)   09/24/24 78.9 kg (174 lb)   09/12/24 78.4 kg (172 lb 13.5 oz)      There is no height or weight on file to calculate BMI.       Assessment/Plan   Problem List Items Addressed This Visit       Diabetes mellitus due to underlying condition, controlled, with diabetic nephropathy, without long-term current use of insulin (Multi)     Patient's goal A1c is < 7%.  Is pt at goal? Yes, most recent A1C was 6.2%.  Patient's SMBGs are well controlled.     Rationale for plan: patient has HF and CKD.  Per patient/daughter preference will continue Tradjenta with initiation of Jardiance. If doing well at follow-up visit, then we will discontinue Tradjenta. If patient's blood sugars trend lower with Jardiance initiation, patient may discontinue Tradjenta before follow-up visit.    Medication Changes:  CONTINUE:  Tradjenta 5 mg daily  START  Jardiance 25 mg daily    Future Considerations:  Discontinuation of Tradjenta    Monitoring and Education:   Monitor for BG readings <70 and signs/symptoms of hypoglycemia         Relevant Medications    empagliflozin (Jardiance) 25 mg    Other Relevant Orders    Follow Up In Clinical Pharmacy    Chronic " diastolic (congestive) heart failure (Multi) - Primary (Chronic)     Patient has Stage B Class I HFpEF with most recent EF 67%. Patient is not on complete GDMT.     Rationale for plan: patient has diabetes and CKD.    Medication Changes:  CONTINUE:  Losartan 100 mg daily  Metoprolol succinate 50 mg daily  START  Jardiance 25 mg daily    Monitoring and Education:  Jardiance administration, potential side effects, and clinical benefits  Weigh yourself without clothing daily after using the bathroom first thing in the morning before breakfast   Contact your physician/seek help immediately if you notice the following with symptoms of shortness of breath or swelling in your extremities:   Weight gain of 3+ lbs overnight   Weight gain of 5+ lbs in a week   Physical limitations to your normal physical activity level   Limit fluid intake as instructed by your doctor and follow a heart friendly diet low in salt, fat, and focused in lean meats   Aim to exercise for 30 minutes anywhere between 3 to 5 times a week or more, depending on your physical limitations   Keep a log of your daily BP, HR and weight to share with providers   If you are a smoker or drink alcohol, consider cessation to improve your heart health           Relevant Medications    empagliflozin (Jardiance) 25 mg    Other Relevant Orders    Follow Up In Clinical Pharmacy    Chronic obstructive lung disease (Multi)     Patient with COPD and Asthma that is well controlled and stable. Patient had started smoking again per chart review, but patient reports she had one cigarette recently and none thereafter.    -Continue current inhaler regimen unless breathing worsens. If so, take Advair daily.  -Continue tobacco cessation.         Relevant Orders    Follow Up In Clinical Pharmacy     Clinical Pharmacist follow-up: 10/21/24, Telehealth visit    Continue all meds under the continuation of care with the referring provider and clinical pharmacy team.    Thank  Lorraine caballero  Clinical Pharmacist  [phone]    Verbal consent to manage patient's drug therapy was obtained from the patient. They were informed they may decline to participate or withdraw from participation in pharmacy services at any time.

## 2024-09-30 NOTE — ASSESSMENT & PLAN NOTE
Patient with COPD and Asthma that is well controlled and stable. Patient had started smoking again per chart review, but patient reports she had one cigarette recently and none thereafter.    -Continue current inhaler regimen unless breathing worsens. If so, take Advair daily.  -Continue tobacco cessation.

## 2024-09-30 NOTE — ASSESSMENT & PLAN NOTE
Patient's goal A1c is < 7%.  Is pt at goal? Yes, most recent A1C was 6.2%.  Patient's SMBGs are well controlled.     Rationale for plan: patient has HF and CKD.  Per patient/daughter preference will continue Tradjenta with initiation of Jardiance. If doing well at follow-up visit, then we will discontinue Tradjenta. If patient's blood sugars trend lower with Jardiance initiation, patient may discontinue Tradjenta before follow-up visit.    Medication Changes:  CONTINUE:  Tradjenta 5 mg daily  START  Jardiance 25 mg daily    Future Considerations:  Discontinuation of Tradjenta    Monitoring and Education:   Monitor for BG readings <70 and signs/symptoms of hypoglycemia

## 2024-09-30 NOTE — ASSESSMENT & PLAN NOTE
Patient has Stage B Class I HFpEF with most recent EF 67%. Patient is not on complete GDMT.     Rationale for plan: patient has diabetes and CKD.    Medication Changes:  CONTINUE:  Losartan 100 mg daily  Metoprolol succinate 50 mg daily  START  Jardiance 25 mg daily    Monitoring and Education:  Jardiance administration, potential side effects, and clinical benefits  Weigh yourself without clothing daily after using the bathroom first thing in the morning before breakfast   Contact your physician/seek help immediately if you notice the following with symptoms of shortness of breath or swelling in your extremities:   Weight gain of 3+ lbs overnight   Weight gain of 5+ lbs in a week   Physical limitations to your normal physical activity level   Limit fluid intake as instructed by your doctor and follow a heart friendly diet low in salt, fat, and focused in lean meats   Aim to exercise for 30 minutes anywhere between 3 to 5 times a week or more, depending on your physical limitations   Keep a log of your daily BP, HR and weight to share with providers   If you are a smoker or drink alcohol, consider cessation to improve your heart health

## 2024-10-01 ENCOUNTER — OFFICE VISIT (OUTPATIENT)
Dept: SURGERY | Facility: CLINIC | Age: 74
End: 2024-10-01
Payer: COMMERCIAL

## 2024-10-01 ENCOUNTER — HOSPITAL ENCOUNTER (OUTPATIENT)
Dept: RADIOLOGY | Facility: CLINIC | Age: 74
Discharge: HOME | End: 2024-10-01
Payer: COMMERCIAL

## 2024-10-01 VITALS
OXYGEN SATURATION: 98 % | SYSTOLIC BLOOD PRESSURE: 76 MMHG | BODY MASS INDEX: 31.89 KG/M2 | WEIGHT: 180 LBS | HEART RATE: 61 BPM | HEIGHT: 63 IN | DIASTOLIC BLOOD PRESSURE: 46 MMHG

## 2024-10-01 DIAGNOSIS — Q85.9 HAMARTOMA (MULTI): ICD-10-CM

## 2024-10-01 PROCEDURE — 71046 X-RAY EXAM CHEST 2 VIEWS: CPT

## 2024-10-01 PROCEDURE — 1111F DSCHRG MED/CURRENT MED MERGE: CPT | Performed by: STUDENT IN AN ORGANIZED HEALTH CARE EDUCATION/TRAINING PROGRAM

## 2024-10-01 PROCEDURE — 1159F MED LIST DOCD IN RCRD: CPT | Performed by: STUDENT IN AN ORGANIZED HEALTH CARE EDUCATION/TRAINING PROGRAM

## 2024-10-01 PROCEDURE — 71046 X-RAY EXAM CHEST 2 VIEWS: CPT | Performed by: RADIOLOGY

## 2024-10-01 PROCEDURE — 3008F BODY MASS INDEX DOCD: CPT | Performed by: STUDENT IN AN ORGANIZED HEALTH CARE EDUCATION/TRAINING PROGRAM

## 2024-10-01 PROCEDURE — 3074F SYST BP LT 130 MM HG: CPT | Performed by: STUDENT IN AN ORGANIZED HEALTH CARE EDUCATION/TRAINING PROGRAM

## 2024-10-01 PROCEDURE — 4010F ACE/ARB THERAPY RXD/TAKEN: CPT | Performed by: STUDENT IN AN ORGANIZED HEALTH CARE EDUCATION/TRAINING PROGRAM

## 2024-10-01 PROCEDURE — 3078F DIAST BP <80 MM HG: CPT | Performed by: STUDENT IN AN ORGANIZED HEALTH CARE EDUCATION/TRAINING PROGRAM

## 2024-10-01 PROCEDURE — 3044F HG A1C LEVEL LT 7.0%: CPT | Performed by: STUDENT IN AN ORGANIZED HEALTH CARE EDUCATION/TRAINING PROGRAM

## 2024-10-01 PROCEDURE — 99211 OFF/OP EST MAY X REQ PHY/QHP: CPT | Performed by: STUDENT IN AN ORGANIZED HEALTH CARE EDUCATION/TRAINING PROGRAM

## 2024-10-01 PROCEDURE — 3050F LDL-C >= 130 MG/DL: CPT | Performed by: STUDENT IN AN ORGANIZED HEALTH CARE EDUCATION/TRAINING PROGRAM

## 2024-10-01 NOTE — PROGRESS NOTES
Chief complaint:  RUL lung nodule - post op visit    History Of Present Illness  Serjio Nunes is a 74 y.o. female, current smoker (quit >10 years but recent stressors has started again; 20 pack year hx max), who presented with a right upper lobe lung nodule.   She is now s/p a Rt VATS upper lobe wedge on 9/12/24 for what turned out to be a hamartoma.     Since leaving the hospital has been doing well overall. Her pain along her incisions continues to improve. No cough, wheezing or CP. Breathing continues to improve.     Of note, she is slightly hypotensive today- minimal sxs, mild dizziness, no lightheaded sensations. No balance issues. No recent fevers chills. No other physical complaints.     By way of review: Patient was referred by Peyton Alcazar.  She has a h/o left breast cancer status post lumpectomy and chemoradiation, acute on chronic diastolic heart failure presenting for follow-up of a lung nodule seen on CT MRI cardiac dated 03/07/2024. CT scan obtained 7/26 showed a 1cm right upper lobe nodule slightly increased in size compared to MRI.     No history of MI or CVA. Could walk a mile or climb 2 flights of stairs: yes     Past Medical History  She has a past medical history of Anemia, Anxiety, Asthma ([facility]), Bipolar disorder (Multi), Breast cancer (Multi) (2013), Cardiology follow-up encounter (03/19/2024), CHF (congestive heart failure) (Multi), Chronic venous insufficiency, COPD (chronic obstructive pulmonary disease) (Multi), Coronary artery disease, Diverticulosis, DVT (deep venous thrombosis) (Multi) (1972), Fibromyalgia, GERD (gastroesophageal reflux disease), Gout, Hyperlipidemia, Hypertension, Insomnia, Lumbar radiculopathy, Lung nodule, Nonrheumatic aortic (valve) insufficiency, Osteoarthritis, RLS (restless legs syndrome), Sleep apnea, TIA (transient ischemic attack) (2015), Type 2 diabetes mellitus (Multi), and Vitamin D deficiency.    Surgical History  She has a past surgical history that  includes Hernia repair; Breast lumpectomy (Left, 2013); Breast biopsy; Venous ablation; Cardiac catheterization (04/08/2016); Soft Tissue Biopsy (08/26/2024); Hysterectomy; Cervical fusion; Bunionectomy (Bilateral); Foot surgery (Right); Rotator cuff repair (Left); and Lung removal, partial (Right, 09/12/2024).     Social History  She reports that she quit smoking about 11 years ago. Her smoking use included cigarettes. She started smoking about 59 years ago. She has a 24 pack-year smoking history. She has never used smokeless tobacco. She reports that she does not currently use alcohol. She reports current drug use. Drug: Marijuana.    Family History  Family history of cancer: Yes  Family History   Problem Relation Name Age of Onset    Cancer Mother          COLORECTAL    Aneurysm Mother      Stroke Mother      Arthritis Father      Hypertension Father      Aneurysm Father          Medications    Current Outpatient Medications:     acetaminophen (Tylenol) 325 mg tablet, Take 2 tablets (650 mg) by mouth every 6 hours for 7 days., Disp: , Rfl:     amLODIPine (Norvasc) 10 mg tablet, TAKE 1 TABLET BY MOUTH EVERY DAY, Disp: 90 tablet, Rfl: 3    aspirin 81 mg EC tablet, Take 1 tablet (81 mg) by mouth once daily., Disp: , Rfl:     atorvastatin (Lipitor) 40 mg tablet, Take 1 tablet (40 mg) by mouth once daily., Disp: 30 tablet, Rfl: 11    busPIRone (Buspar) 5 mg tablet, TAKE 1 TABLET (5 MG) BY MOUTH 2 TIMES A DAY AS NEEDED (WHEN FEELING ANXIOUS)., Disp: 180 tablet, Rfl: 3    carvedilol (Coreg) 25 mg tablet, TAKE 1 TABLET BY MOUTH TWICE A DAY, Disp: 180 tablet, Rfl: 3    cholecalciferol (Vitamin D-3) 50 MCG (2000 UT) tablet, Take 1 tablet (2,000 Units) by mouth once daily., Disp: , Rfl:     colchicine 0.6 mg tablet, Take 1 tablet (0.6 mg) by mouth once daily., Disp: 90 tablet, Rfl: 1    diphenhydrAMINE (BENADryl) 50 mg capsule, Take one capsule (50 mg) by mouth one hour before contrast medium administration, Disp: 1  capsule, Rfl: 0    empagliflozin (Jardiance) 25 mg, Take 1 tablet (25 mg) by mouth once daily., Disp: 90 tablet, Rfl: 1    fluocinonide 0.05 % cream, Apply topically 2 times a day. APPLY SPARINGLY TO AFFECTED AREA, Disp: , Rfl:     fluticasone (Flonase) 50 mcg/actuation nasal spray, Administer 1 spray into each nostril once daily., Disp: , Rfl:     fluticasone propion-salmeteroL (Advair Diskus) 250-50 mcg/dose diskus inhaler, Inhale 1 puff 2 times a day., Disp: , Rfl:     FreeStyle Jaycob 14 Day Sensor kit, APPLY SENSOR TO BACK UPPER ARM REMOVE AND REPLACE EVERY 14 DAYS USE WITH DEVICE, Disp: 10 each, Rfl: 3    furosemide (Lasix) 40 mg tablet, Take 0.5 tablets (20 mg) by mouth once daily. (Patient taking differently: Take 1 tablet (40 mg) by mouth once daily.), Disp: 30 tablet, Rfl: 3    Klor-Con M10 10 mEq ER tablet, TAKE 1 TABLET BY MOUTH EVERY DAY, Disp: 90 tablet, Rfl: 3    losartan (Cozaar) 100 mg tablet, Take 0.5 tablets (50 mg) by mouth once daily. (Patient taking differently: Take 1 tablet (100 mg) by mouth once daily.), Disp: 90 tablet, Rfl: 0    miconazole (Micotin) 2 % cream, miconazole nitrate 2 % topical cream  APPLY SPARINGLY TO AFFECTED AREA TWICE A DAY, Disp: , Rfl:     nitroglycerin (Nitrostat) 0.4 mg SL tablet, Place 1 tablet (0.4 mg) under the tongue every 5 minutes if needed for chest pain., Disp: 90 tablet, Rfl: 11    omeprazole OTC (PriLOSEC OTC) 20 mg EC tablet, Take 1 tablet (20 mg) by mouth once daily. Do not crush, chew, or split., Disp: 30 tablet, Rfl: 11    polyethylene glycol (Glycolax, Miralax) 17 gram packet, Take 17 g by mouth once daily for 7 days. Hold for diarrhea, Disp: 7 packet, Rfl: 0    predniSONE (Deltasone) 50 mg tablet, Take one tablet (50 mg) by mouth 13 hours, 7 hours and 1 hour before contrast medium administration, Disp: 3 tablet, Rfl: 0    sennosides (Senokot) 8.6 mg tablet, Take 2 tablets (17.2 mg) by mouth 2 times a day for 7 days. Hold for diarrhea, Disp: 28 tablet,  "Rfl: 0    tiZANidine (Zanaflex) 4 mg tablet, TAKE 1 TABLET (4 MG) BY MOUTH EVERY 6 HOURS IF NEEDED FOR MUSCLE SPASMS, Disp: 90 tablet, Rfl: 1    Tradjenta 5 mg tablet, TAKE 1 TABLET BY MOUTH EVERY DAY AS DIRECTED, Disp: 90 tablet, Rfl: 1    Allergies  Iodinated contrast media, Strawberry, Iodine, and Latex    Review of Systems:  Review of Systems   Constitutional: No fevers, chills, unexpected weight change  HENT: No sore throat, congestion, or nasal drainage  Eyes: No visual changes or eye itching  Respiratory: see HPI. No cough, worsening dyspnea, wheezing  Cardiac: No chest pain, palpitations, or lower extremity edema  Gastrointestinal: No nausea, vomiting, diarrhea. No abdominal pain  Genitourinary: No dysuria or hematuria  Musculoskeletal: No back pain. No significant myalgias or arthralgias  Neurologic: No headaches, dizziness, or seizures.  Hematologic: No east bleeding or bruising.  Psychiatric: No anxiety or depression.    Physical Exam:  Physical Exam  BP (!) 76/46   Pulse 61   Ht 1.6 m (5' 3\")   Wt 81.6 kg (180 lb)   SpO2 98%   BMI 31.89 kg/m²   Constitutional:       General: Patient is not in acute distress.     Appearance: Normal appearance; not ill-appearing.   HENT:      Head: Normocephalic.      Nose: No congestion or rhinorrhea.   Cardiovascular:      Rate and Rhythm: Normal rate and regular rhythm.      Pulses: Normal pulses.   Pulmonary:      Effort: Pulmonary effort is normal. No respiratory distress.  No conversational dyspnea. Rt VATS incisions c/d/I.      Breath sounds: No stridor. No wheezing.   Abdominal:      General: There is no distension.      Palpations: Abdomen is soft.      Tenderness: There is no abdominal tenderness.   Musculoskeletal:         General: No swelling, tenderness or deformity. Normal range of motion.      Cervical back: Normal range of motion. No rigidity.   Lymphadenopathy:      Cervical: No cervical adenopathy.   Skin:     General: Skin is warm and dry. " "  Neurological:      General: No focal deficit present.      Mental Status: Patient is alert and oriented to person, place, and time.   Psychiatric:         Mood and Affect: Mood normal.     Relevant Results    Pathology:  FINAL DIAGNOSIS   A. LUNG,  RIGHT; WEDGE RESECTION:  -- Pulmonary hamartoma (1.3 cm), completely resected.  -- Background lung parenchyma with patchy fibrosing interstitial pneumonia. See comment           Imaging:    Pulmonary Functions Testing Results:    No results found for: \"FEV1\", \"FVC\", \"TLL1SPK\", \"TLC\", \"DLCO\"    CXR personally reviewed     Assessment/Plan   Problem List Items Addressed This Visit    None  Visit Diagnoses         Codes    Hamartoma (Multi)     Q85.9    Relevant Orders    XR chest 2 views            Ms. Nunes is a pleasant 74 year old female h/o left breast cancer s/p lumpectomy with adjuvant chemo/RT. She presents for a slowly enlarging, mildly PET avid, right upper lobe 1.0 cm lung nodule. She is now s/p a Rt VATS upper lobe wedge on 9/12/24 for what turned out to be a hamartoma thankfully. I will plan to see her in 1 year with LDCT given her smoking hx. She can call sooner with any questions or concerns.   Discussed BP monitoring/hypotension and sxs to watch out for    Katya Bansal, DO  Thoracic & Esophageal Surgery     "

## 2024-10-01 NOTE — PATIENT INSTRUCTIONS
"Follow up in 1 year with CT chest prior. See appointment below in \"What's Next\".  Call our office with any questions. [phone]   "

## 2024-10-08 PROBLEM — I73.9 PAD (PERIPHERAL ARTERY DISEASE) (CMS-HCC): Status: ACTIVE | Noted: 2024-09-27

## 2024-10-09 ENCOUNTER — PATIENT OUTREACH (OUTPATIENT)
Dept: PRIMARY CARE | Facility: CLINIC | Age: 74
End: 2024-10-09
Payer: COMMERCIAL

## 2024-10-21 ENCOUNTER — APPOINTMENT (OUTPATIENT)
Dept: PHARMACY | Facility: HOSPITAL | Age: 74
End: 2024-10-21
Payer: COMMERCIAL

## 2024-10-21 DIAGNOSIS — E08.21 DIABETES MELLITUS DUE TO UNDERLYING CONDITION, CONTROLLED, WITH DIABETIC NEPHROPATHY, WITHOUT LONG-TERM CURRENT USE OF INSULIN: ICD-10-CM

## 2024-10-21 DIAGNOSIS — I50.32 CHRONIC DIASTOLIC (CONGESTIVE) HEART FAILURE: Chronic | ICD-10-CM

## 2024-10-21 DIAGNOSIS — J44.9 CHRONIC OBSTRUCTIVE PULMONARY DISEASE, UNSPECIFIED COPD TYPE (MULTI): ICD-10-CM

## 2024-10-21 NOTE — ASSESSMENT & PLAN NOTE
Patient's goal A1c is < 7%.  Is pt at goal? Yes, 6.2%  Patient's SMBGs are typically 130-150s.     Patient overall doing well from a diabetes standpoint. At her prior appointment she was started on Jardiance 25 mg daily while also holding her Tradjenta. She noticed over the last month her blood sugars were running high (180s-200s) while just on the single medication. She has since resumed Tradjenta, and her sugars are returning to her baseline, with her most recent reading being 125 this morning. She is tolerating her medications w/o complaints of side effects. Will continue both Jardiance and Tradjenta for now and closely watch her blood sugars. Follow up in one month.     Medication Changes:  CONTINUE:  Jardiance 25 mg daily  Tradjenta 5 mg daily    Future Considerations:  Check A1C    Monitoring and Education:  Monitor blood sugars closely as you have been. Make note of any time sugars are above 180 and report back with results in one month.

## 2024-10-21 NOTE — ASSESSMENT & PLAN NOTE
Patient doing well in managing her COPD. She is regularly asymptomatic and denies any dypsnea, productive cough, or wheezing. Only occasionally uses her rescue inhaler. She has still yet to receive her annual influenza vaccine. Recommended she speak with her PCP regarding Zoster vaccination. Will continue all medications at this time and reassess again in one month.

## 2024-10-21 NOTE — ASSESSMENT & PLAN NOTE
Patient has Stage B Class 1 HFpEF with most recent EF 67%. Patient is not on complete GDMT.     Patient doing excellent from a heart failure standpoint. She denies any symptoms of heart failure including dyspnea and edema. Patient is staying active and watching her diet closely. Will continue all medications at this time.    Medication Changes:  CONTINUE:  Losartan 100 mg daily  Carvedilol 25 mg twice daily  Jardiance 25 mg daily  Furosemide 40 mg daily    Monitoring and Education:  Weigh yourself without clothing daily after using the bathroom first thing in the morning before breakfast   Contact your physician/seek help immediately if you notice the following with symptoms of shortness of breath or swelling in your extremities:   Weight gain of 3+ lbs overnight   Weight gain of 5+ lbs in a week   Physical limitations to your normal physical activity level   Limit fluid intake as instructed by your doctor and follow a heart friendly diet low in salt, fat, and focused in lean meats   Aim to exercise for 30 minutes anywhere between 3 to 5 times a week or more, depending on your physical limitations   Keep a log of your daily BP, HR and weight to share with providers   If you are a smoker or drink alcohol, consider cessation to improve your heart health

## 2024-10-21 NOTE — PROGRESS NOTES
Pharmacy Post-Discharge Visit    Serjio Nunes is a 74 y.o. female was referred to Clinical Pharmacy Team to complete a post-discharge medication optimization and monitoring visit.  The patient was referred for their Diabetes, Congestive Heart Failure (/), and COPD.    Pt is here for Follow Up appointment.     Admission Date: 9/12/24  Discharge Date: 9/13/24    Referring Provider/PCP: Raymond Carnes MD  Last Visit: 9/23/24  Next visit: 12/19/24    Subjective   HPI  DIABETES MELLITUS TYPE 2:    Known diabetic complications: nephropathy.  Does patient follow with Endocrinology: Not at this time  Last optometry exam: ~1 year ago  Most recent visit in Podiatry: follows pediatry, uncertain of when last appointment was.     Current diabetic medications include:  Tradjenta 5 mg daily  Jardiance 25 mg daily  When she was just taking the Jardiance, sugars went up in the 190s,200s     Clarifications to above regimen: none  Adverse Effects: none    Past diabetic medications include:      Glucose Readings:  Glucometer/CGM Type: Jaycob  Patient reviews blood sugars with her daughter throughout the day. They have been fluctuating quite a bit. She was holding the Tradjenta when Jardiance was prescribed, her blood sugars ranged from 150s-240s. They then restarted Tradjenta and it has now been closer to what it was before, but still sitting around 150. Her most recent reading this morning was 125, which the daughter claims is what it typically runs. Counseled them to note times when it gets above 180. No symptoms of hypoglycemia.     Lifestyle:  Tries to eat a healthy diet. Has trouble eating breakfast so really starts eating around lunch.   Physical Activity: She has been staying active, walking around.     Secondary Prevention:  Statin? Yes  ACE-I/ARB? Yes  Aspirin? Yes    Pertinent PMH Review:  PMH of Pancreatitis: No  PMH of Retinopathy: No  PMH of Urinary Tract Infections: No  PMH of MTC: No    CONGESTIVE HEART FAILURE  ASSESSMENT  Does patient follow with Cardiology: Yes    Date: 10/22/24    Staging  Ejection Fraction: 67% (1/12/24)  NYHA Class: Class I - No limitations to physical activity  ACC/AHA Stage: B - Structural disease without SxS    Symptom Assessment  Weight changes/edema?: No  Dyspnea?: None    Medication Therapy  Current Regimen (GDMT):  ARNI/ACEi/ARB: Yes - Losartan 100 mg daily  Beta Blocker: Yes - Carvedilol 25 mg bid  MRA: No  SGLT2i: Yes - Jardiance 25 mg daily    Other therapy:  Furosemide 40 mg daily    Clarifications to above regimen: none   Adverse Effects: she is going to the bathroom more frequently. Likely due to combination of Jardiance and daily Lasix. Recommended she consult with her cardiologist who manages her Lasix.     Secondary Prevention  The ASCVD Risk score (Isabel OAKES, et al., 2019) failed to calculate for the following reasons:    The valid systolic blood pressure range is 90 to 200 mmHg  Aspirin 81mg? no  Statin?: Yes - Atorvastatin 40 mg daily  HTN?: Yes - well controlled     PULMONARY ASSESSMENT  Patient has been diagnosed with: COPD  does not see a pulmonologist  Has had PFT's completed in the last two years    Current Regimen:  Duonebs prn  Albuterol HFA prn    Advair 250/50 1 puff twice daily    Clarifications to above regimen: none   Adverse Effects: none   Appropriate technique? Yes    How often do you use your rescue inhaler? Very occasionally     Symptom Assessment:  Current symptoms:  none  Symptoms are  remain unchanged   Triggers: allergens in the air occasionally   Alleviating factors: uses albuterol if truly needed     Exacerbation Hx:  When was your last hospitalization for an exacerbation? November 2023  When was the last time you were treated with antibiotics and/or steroids? July 2024     Immunization History:  Influenza: 2022 - due for yearly vaccination   PCV13: 2019  PPSV23: 2017  PCV20: N/A  COVID: primary series in 2021  RSV: N/A  Has not gotten the shingles  vaccination, recommended she speak with her PCP.     Smoking history:  Occasionally. Last cigarette was this morning.       Medication Reconciliation  Changed:   Added:   Discontinued:     Drug Interactions  No relevant drug interactions were noted.    Medication System Management  Patient's preferred pharmacy: [facility]  Adherence/Organization: taking regularly, has great support system with her daughter helping out  Affordability/Accessibility: able to afford      Objective   Allergies   Allergen Reactions    Iodinated Contrast Media Anaphylaxis    Strawberry Angioedema    Iodine Swelling    Latex Rash     Social History     Social History Narrative    Not on file      Medication Review  Current Outpatient Medications   Medication Instructions    amLODIPine (Norvasc) 10 mg tablet TAKE 1 TABLET BY MOUTH EVERY DAY    aspirin 81 mg EC tablet 1 tablet, Daily    atorvastatin (LIPITOR) 40 mg, oral, Daily    busPIRone (BUSPAR) 5 mg, oral, 2 times daily PRN    carvedilol (Coreg) 25 mg tablet TAKE 1 TABLET BY MOUTH TWICE A DAY    cholecalciferol (VITAMIN D-3) 2,000 Units, Daily RT    colchicine 0.6 mg, oral, Daily    diphenhydrAMINE (BENADryl) 50 mg capsule Take one capsule (50 mg) by mouth one hour before contrast medium administration    empagliflozin (JARDIANCE) 25 mg, oral, Daily    fluocinonide 0.05 % cream 2 times daily    fluticasone (Flonase) 50 mcg/actuation nasal spray 1 spray, Daily    fluticasone propion-salmeteroL (Advair Diskus) 250-50 mcg/dose diskus inhaler 1 puff, 2 times daily RT    FreeStyle Jaycob 14 Day Sensor kit APPLY SENSOR TO BACK UPPER ARM REMOVE AND REPLACE EVERY 14 DAYS USE WITH DEVICE    furosemide (LASIX) 20 mg, oral, Daily    Klor-Con M10 10 mEq ER tablet 10 mEq, oral, Daily    losartan (COZAAR) 50 mg, oral, Daily    miconazole (Micotin) 2 % cream miconazole nitrate 2 % topical cream   APPLY SPARINGLY TO AFFECTED AREA TWICE A DAY    nitroglycerin (NITROSTAT) 0.4 mg, sublingual,  "Every 5 min PRN    omeprazole OTC (PRILOSEC OTC) 20 mg, oral, Daily, Do not crush, chew, or split.    tiZANidine (ZANAFLEX) 4 mg, oral, Every 6 hours PRN    Tradjenta 5 mg, oral, Daily, as directed      Vitals  BP Readings from Last 2 Encounters:   10/01/24 (!) 76/46   09/27/24 112/72     BMI Readings from Last 1 Encounters:   10/01/24 31.89 kg/m²      Labs  A1C  Lab Results   Component Value Date    HGBA1C 6.2 (H) 08/05/2024    HGBA1C 5.8 (H) 12/18/2023    HGBA1C 6.3 (A) 06/20/2023     BMP  Lab Results   Component Value Date    CALCIUM 9.5 09/20/2024     09/20/2024    K 3.6 09/20/2024    CO2 27 09/20/2024    CL 99 09/20/2024    BUN 9 09/20/2024    CREATININE 1.07 (H) 09/20/2024    EGFR 55 (L) 09/20/2024     LFTs  Lab Results   Component Value Date    ALT 13 09/20/2024    AST 15 09/20/2024    ALKPHOS 90 09/20/2024    BILITOT 0.3 09/20/2024     FLP  Lab Results   Component Value Date    TRIG 249 (H) 08/05/2024    CHOL 234 (H) 08/05/2024    LDLF 119 (H) 06/20/2023    LDLCALC 150 08/05/2024    HDL 34.6 08/05/2024     Urine Microalbumin  No results found for: \"MICROALBCREA\"  Wt Readings from Last 3 Encounters:   10/01/24 81.6 kg (180 lb)   09/27/24 80.7 kg (178 lb)   09/24/24 78.9 kg (174 lb)      There is no height or weight on file to calculate BMI.       Assessment/Plan   Problem List Items Addressed This Visit       Diabetes mellitus due to underlying condition, controlled, with diabetic nephropathy, without long-term current use of insulin     Patient's goal A1c is < 7%.  Is pt at goal? Yes, 6.2%  Patient's SMBGs are typically 130-150s.     Patient overall doing well from a diabetes standpoint. At her prior appointment she was started on Jardiance 25 mg daily while also holding her Tradjenta. She noticed over the last month her blood sugars were running high (180s-200s) while just on the single medication. She has since resumed Tradjenta, and her sugars are returning to her baseline, with her most recent " reading being 125 this morning. She is tolerating her medications w/o complaints of side effects. Will continue both Jardiance and Tradjenta for now and closely watch her blood sugars. Follow up in one month.     Medication Changes:  CONTINUE:  Jardiance 25 mg daily  Tradjenta 5 mg daily    Future Considerations:  Check A1C    Monitoring and Education:  Monitor blood sugars closely as you have been. Make note of any time sugars are above 180 and report back with results in one month.           Relevant Orders    Referral to Clinical Pharmacy    Chronic diastolic (congestive) heart failure (Chronic)     Patient has Stage B Class 1 HFpEF with most recent EF 67%. Patient is not on complete GDMT.     Patient doing excellent from a heart failure standpoint. She denies any symptoms of heart failure including dyspnea and edema. Patient is staying active and watching her diet closely. Will continue all medications at this time.    Medication Changes:  CONTINUE:  Losartan 100 mg daily  Carvedilol 25 mg twice daily  Jardiance 25 mg daily  Furosemide 40 mg daily    Monitoring and Education:  Weigh yourself without clothing daily after using the bathroom first thing in the morning before breakfast   Contact your physician/seek help immediately if you notice the following with symptoms of shortness of breath or swelling in your extremities:   Weight gain of 3+ lbs overnight   Weight gain of 5+ lbs in a week   Physical limitations to your normal physical activity level   Limit fluid intake as instructed by your doctor and follow a heart friendly diet low in salt, fat, and focused in lean meats   Aim to exercise for 30 minutes anywhere between 3 to 5 times a week or more, depending on your physical limitations   Keep a log of your daily BP, HR and weight to share with providers   If you are a smoker or drink alcohol, consider cessation to improve your heart health            Relevant Orders    Referral to Clinical Pharmacy     Chronic obstructive lung disease (Multi)     Patient doing well in managing her COPD. She is regularly asymptomatic and denies any dypsnea, productive cough, or wheezing. Only occasionally uses her rescue inhaler. She has still yet to receive her annual influenza vaccine. Recommended she speak with her PCP regarding Zoster vaccination. Will continue all medications at this time and reassess again in one month.          Relevant Orders    Referral to Clinical Pharmacy   Clinical Pharmacist follow-up: 11/18/24, Telehealth visit    Continue all meds under the continuation of care with the referring provider and clinical pharmacy team.    Thank you,  Nader Carballo, PharmD    Meds PGY1 Resident  [phone]    Verbal consent to manage patient's drug therapy was obtained from the patient. They were informed they may decline to participate or withdraw from participation in pharmacy services at any time.

## 2024-10-22 ENCOUNTER — OFFICE VISIT (OUTPATIENT)
Dept: CARDIOLOGY | Facility: CLINIC | Age: 74
End: 2024-10-22
Payer: COMMERCIAL

## 2024-10-22 VITALS
HEIGHT: 63 IN | WEIGHT: 177.6 LBS | HEART RATE: 61 BPM | DIASTOLIC BLOOD PRESSURE: 58 MMHG | OXYGEN SATURATION: 96 % | SYSTOLIC BLOOD PRESSURE: 128 MMHG | BODY MASS INDEX: 31.47 KG/M2

## 2024-10-22 DIAGNOSIS — R06.09 DOE (DYSPNEA ON EXERTION): Primary | ICD-10-CM

## 2024-10-22 DIAGNOSIS — I10 PRIMARY HYPERTENSION: ICD-10-CM

## 2024-10-22 DIAGNOSIS — R07.89 ATYPICAL CHEST PAIN: ICD-10-CM

## 2024-10-22 DIAGNOSIS — I87.2 CHRONIC VENOUS INSUFFICIENCY: ICD-10-CM

## 2024-10-22 DIAGNOSIS — I73.9 PAD (PERIPHERAL ARTERY DISEASE) (CMS-HCC): ICD-10-CM

## 2024-10-22 DIAGNOSIS — I50.32 CHRONIC DIASTOLIC (CONGESTIVE) HEART FAILURE: ICD-10-CM

## 2024-10-22 DIAGNOSIS — E78.00 PURE HYPERCHOLESTEROLEMIA: ICD-10-CM

## 2024-10-22 DIAGNOSIS — I35.1 NONRHEUMATIC AORTIC VALVE INSUFFICIENCY: ICD-10-CM

## 2024-10-22 DIAGNOSIS — I11.0 HYPERTENSIVE HEART DISEASE WITH HEART FAILURE: ICD-10-CM

## 2024-10-22 DIAGNOSIS — I50.33 ACUTE ON CHRONIC DIASTOLIC CONGESTIVE HEART FAILURE: ICD-10-CM

## 2024-10-22 PROCEDURE — 1126F AMNT PAIN NOTED NONE PRSNT: CPT | Performed by: STUDENT IN AN ORGANIZED HEALTH CARE EDUCATION/TRAINING PROGRAM

## 2024-10-22 PROCEDURE — 4010F ACE/ARB THERAPY RXD/TAKEN: CPT | Performed by: STUDENT IN AN ORGANIZED HEALTH CARE EDUCATION/TRAINING PROGRAM

## 2024-10-22 PROCEDURE — 99215 OFFICE O/P EST HI 40 MIN: CPT | Performed by: STUDENT IN AN ORGANIZED HEALTH CARE EDUCATION/TRAINING PROGRAM

## 2024-10-22 PROCEDURE — 3074F SYST BP LT 130 MM HG: CPT | Performed by: STUDENT IN AN ORGANIZED HEALTH CARE EDUCATION/TRAINING PROGRAM

## 2024-10-22 PROCEDURE — 3044F HG A1C LEVEL LT 7.0%: CPT | Performed by: STUDENT IN AN ORGANIZED HEALTH CARE EDUCATION/TRAINING PROGRAM

## 2024-10-22 PROCEDURE — 3078F DIAST BP <80 MM HG: CPT | Performed by: STUDENT IN AN ORGANIZED HEALTH CARE EDUCATION/TRAINING PROGRAM

## 2024-10-22 PROCEDURE — 3008F BODY MASS INDEX DOCD: CPT | Performed by: STUDENT IN AN ORGANIZED HEALTH CARE EDUCATION/TRAINING PROGRAM

## 2024-10-22 PROCEDURE — 1036F TOBACCO NON-USER: CPT | Performed by: STUDENT IN AN ORGANIZED HEALTH CARE EDUCATION/TRAINING PROGRAM

## 2024-10-22 PROCEDURE — 1159F MED LIST DOCD IN RCRD: CPT | Performed by: STUDENT IN AN ORGANIZED HEALTH CARE EDUCATION/TRAINING PROGRAM

## 2024-10-22 PROCEDURE — 3050F LDL-C >= 130 MG/DL: CPT | Performed by: STUDENT IN AN ORGANIZED HEALTH CARE EDUCATION/TRAINING PROGRAM

## 2024-10-22 PROCEDURE — G2211 COMPLEX E/M VISIT ADD ON: HCPCS | Performed by: STUDENT IN AN ORGANIZED HEALTH CARE EDUCATION/TRAINING PROGRAM

## 2024-10-22 ASSESSMENT — ENCOUNTER SYMPTOMS
OCCASIONAL FEELINGS OF UNSTEADINESS: 0
DEPRESSION: 0
LOSS OF SENSATION IN FEET: 0

## 2024-10-22 ASSESSMENT — PAIN SCALES - GENERAL: PAINLEVEL_OUTOF10: 0-NO PAIN

## 2024-10-22 NOTE — PROGRESS NOTES
No chief complaint on file.     HPI:    Serjio Nunes is a 74 y.o. female with pertinent history of hypertension, dyslipidemia, obesity, obstructive sleep apnea, history of DVT, history of chronic venous insufficiency status post vein ablation in the remote past and h/o left breast cancer s/p lumpectomy, chemotherapy and radiation, peripheral arterial disease, acute on chronic diastolic heart failure with EDP 25 mmHg on LHC with non-obstructive CAD on 4/8/16, preserved ejection fraction with impaired relaxation diastolic dysfunction and mild to moderate aortic regurgitation an echo performed 1/8/2021, preserved ejection fraction with impaired relaxation and moderate aortic regurgitation on echo performed 4/14/2023, no clear ischemia nuclear pharmacologic stress test performed 1/12/2024, moderate aortic regurgitation with regurgitant fraction of 30%, regurgitant volume of 20%, normal LVEF of 68%, RVEF of 60%, no infiltrative or scarring, lung nodule on cardiac MRI performed 3/7/2024 presents for follow-up.    She is accompanied by her daughter who provides some history and has questions.  Her recent hospital course was reviewed and discussed.  She does have an upcoming lower extremity angiogram with Dr. Desir.  We discussed the natural history of peripheral arterial disease.  Dyspnea on exertion is relatively stable.  She has minimal episodes of chest discomfort.  No exacerbating or relieving factors.  Pt denies orthopnea, and paroxysmal nocturnal dyspnea.  Pt denies worsening lower extremity edema.  Pt denies palpitations or syncope.  No recent falls.  No fever or chills.  No cough.  No change in bowel or bladder habits.  No sick contacts.  No recent travel.    12 point review of systems including (Constitutional, Eyes, ENMT, Respiratory, Cardiac, Gastrointestinal, Neurological, Psychiatric, and Hematologic) was performed and is otherwise negative.    Past medical history reviewed:   has a past medical history  of Anemia, Anxiety, Asthma, Bipolar disorder, Breast cancer (Multi) (2013), Cardiology follow-up encounter (03/19/2024), CHF (congestive heart failure), Chronic venous insufficiency, COPD (chronic obstructive pulmonary disease) (Multi), Coronary artery disease, Diverticulosis, DVT (deep venous thrombosis) (Multi) (1972), Fibromyalgia, GERD (gastroesophageal reflux disease), Gout, Hyperlipidemia, Hypertension, Insomnia, Lumbar radiculopathy, Lung nodule, Nonrheumatic aortic (valve) insufficiency, Osteoarthritis, RLS (restless legs syndrome), Sleep apnea, TIA (transient ischemic attack) (2015), Type 2 diabetes mellitus, and Vitamin D deficiency.    Past surgical history reviewed:   has a past surgical history that includes Hernia repair; Breast lumpectomy (Left, 2013); Breast biopsy; Venous ablation; Cardiac catheterization (04/08/2016); Soft Tissue Biopsy (08/26/2024); Hysterectomy; Cervical fusion; Bunionectomy (Bilateral); Foot surgery (Right); Rotator cuff repair (Left); and Lung removal, partial (Right, 09/12/2024).    Social history reviewed:   reports that she quit smoking about 11 years ago. Her smoking use included cigarettes. She started smoking about 59 years ago. She has a 24 pack-year smoking history. She has never used smokeless tobacco. She reports that she does not currently use alcohol. She reports current drug use. Drug: Marijuana.     Family history reviewed:    Family History   Problem Relation Name Age of Onset    Cancer Mother          COLORECTAL    Aneurysm Mother      Stroke Mother      Arthritis Father      Hypertension Father      Aneurysm Father         Allergies reviewed: Iodinated contrast media, Strawberry, Iodine, and Latex     Medications reviewed:   Current Outpatient Medications   Medication Instructions    amLODIPine (Norvasc) 10 mg tablet TAKE 1 TABLET BY MOUTH EVERY DAY    aspirin 81 mg EC tablet 1 tablet, Daily    atorvastatin (LIPITOR) 40 mg, oral, Daily    busPIRone (BUSPAR) 5  mg, oral, 2 times daily PRN    carvedilol (Coreg) 25 mg tablet TAKE 1 TABLET BY MOUTH TWICE A DAY    cholecalciferol (VITAMIN D-3) 2,000 Units, Daily RT    colchicine 0.6 mg, oral, Daily    diphenhydrAMINE (BENADryl) 50 mg capsule Take one capsule (50 mg) by mouth one hour before contrast medium administration    empagliflozin (JARDIANCE) 25 mg, oral, Daily    fluocinonide 0.05 % cream 2 times daily    fluticasone (Flonase) 50 mcg/actuation nasal spray 1 spray, Daily    fluticasone propion-salmeteroL (Advair Diskus) 250-50 mcg/dose diskus inhaler 1 puff, 2 times daily RT    FreeStyle Jaycob 14 Day Sensor kit APPLY SENSOR TO BACK UPPER ARM REMOVE AND REPLACE EVERY 14 DAYS USE WITH DEVICE    furosemide (LASIX) 20 mg, oral, Daily    Klor-Con M10 10 mEq ER tablet 10 mEq, oral, Daily    losartan (COZAAR) 50 mg, oral, Daily    miconazole (Micotin) 2 % cream miconazole nitrate 2 % topical cream   APPLY SPARINGLY TO AFFECTED AREA TWICE A DAY    nitroglycerin (NITROSTAT) 0.4 mg, sublingual, Every 5 min PRN    omeprazole OTC (PRILOSEC OTC) 20 mg, oral, Daily, Do not crush, chew, or split.    tiZANidine (ZANAFLEX) 4 mg, oral, Every 6 hours PRN    Tradjenta 5 mg, oral, Daily, as directed        Vitals reviewed: Visit Vitals  /58 (BP Location: Left arm, Patient Position: Sitting)   Pulse 61       Physical Exam:   General:  Patient is awake, alert, and oriented.  Patient is in no acute distress.  HEENT:  Pupils equal and reactive.  Normocephalic.  Moist mucosa.    Neck:  No thyromegaly.  Normal Jugular Venous Pressure.  Cardiovascular:  Regular rate and rhythm.  Normal S1 and S2.  2/6 MARY.  Grade 2 diastolic murmur.  Pulmonary:  Clear to auscultation bilaterally.  Abdomen:  Soft. Non-tender.   Non-distended.  Positive bowel sounds.  Lower Extremities:  2+ pedal pulses. No LE edema.  Neurologic:  Cranial nerves intact.  No focal deficit.   Skin: Skin warm and dry, normal skin turgor.   Psychiatric: Normal affect.    Last  Labs:  CBC -      Lab Results   Component Value Date    WBC 14.9 (H) 09/20/2024    HGB 13.6 09/20/2024    HCT 44.2 09/20/2024     09/20/2024        CMP-  Lab Results   Component Value Date    GLUCOSE 99 09/20/2024     09/20/2024    K 3.6 09/20/2024    CL 99 09/20/2024    CO2 27 09/20/2024    ANIONGAP 20 09/20/2024    BUN 9 09/20/2024    CREATININE 1.07 (H) 09/20/2024    EGFR 55 (L) 09/20/2024    CALCIUM 9.5 09/20/2024    PHOS 2.8 03/18/2024    PROT 7.1 09/20/2024    ALBUMIN 3.9 09/20/2024    AST 15 09/20/2024    ALT 13 09/20/2024    ALKPHOS 90 09/20/2024    BILITOT 0.3 09/20/2024        LIPIDS-  Lab Results   Component Value Date    CHOL 234 (H) 08/05/2024    TRIG 249 (H) 08/05/2024    HDL 34.6 08/05/2024    CHHDL 6.8 08/05/2024    VLDL 50 (H) 08/05/2024        OTHERS-  Lab Results   Component Value Date    HGBA1C 6.2 (H) 08/05/2024    BNP 31 03/18/2024        I personally reviewed the patient's recent vitals, labs, medications, orders, EKGs, pertinent cardiac imaging/ echocardiography and ischemic evaluations including stress testing/ cardiac catheterization.    Assessment and Plan:    Serjio Nunse is a 74 y.o. female with pertinent history of hypertension, dyslipidemia, obesity, obstructive sleep apnea, history of DVT, history of chronic venous insufficiency status post vein ablation in the remote past and h/o left breast cancer s/p lumpectomy, chemotherapy and radiation, peripheral arterial disease, acute on chronic diastolic heart failure with EDP 25 mmHg on LHC with non-obstructive CAD on 4/8/16, preserved ejection fraction with impaired relaxation diastolic dysfunction and mild to moderate aortic regurgitation an echo performed 1/8/2021, preserved ejection fraction with impaired relaxation and moderate aortic regurgitation on echo performed 4/14/2023, no clear ischemia nuclear pharmacologic stress test performed 1/12/2024, moderate aortic regurgitation with regurgitant fraction of 30%,  regurgitant volume of 20%, normal LVEF of 68%, RVEF of 60%, no infiltrative or scarring, lung nodule on cardiac MRI performed 3/7/2024 presents for follow-up.  She is accompanied by her daughter who provides some history and has questions.  Her recent hospital course was reviewed and discussed.  She does have an upcoming lower extremity angiogram with Dr. Desir.  We discussed the natural history of peripheral arterial disease.  Dyspnea on exertion is relatively stable.  She has minimal episodes of chest discomfort.     Please continue current cardiac medications including amlodipine 10 mg daily, aspirin 81 mg daily, carvedilol 25 mg twice daily, furosemide 40 mg daily, losartan 100 mg daily, sublingual nitroglycerin as needed for chest pain.    We will obtain a transthoracic echocardiogram for structural evaluation including ejection fraction, assessment of regional wall motion abnormalities or valvular disease, and further evaluation of hemodynamics.    Please followup with me in Cardiology clinic within the next 7 months.  Please return to clinic sooner or seek emergent care if your symptoms reoccur or worsen.    Thank you for allowing me to participate in their care.  Please feel free to call me with any further questions or concerns.      Brody Cantu MD, FACC, ALYC[facility] PAGE  Division of Cardiovascular Medicine  System Director, Nuclear Cardiology   Medical Director, [facility] [place], [facility]   Clinical , [facility] [employer]  [email or web address]   Office:  [phone]

## 2024-10-22 NOTE — PATIENT INSTRUCTIONS
Please continue current cardiac medications including amlodipine 10 mg daily, aspirin 81 mg daily, carvedilol 25 mg twice daily, furosemide 40 mg daily, losartan 100 mg daily, sublingual nitroglycerin as needed for chest pain.    We will obtain a transthoracic echocardiogram for structural evaluation including ejection fraction, assessment of regional wall motion abnormalities or valvular disease, and further evaluation of hemodynamics.    Please followup with me in Cardiology clinic within the next 7 months.  Please return to clinic sooner or seek emergent care if your symptoms reoccur or worsen.

## 2024-11-04 ENCOUNTER — TELEPHONE (OUTPATIENT)
Dept: CARDIOLOGY | Facility: CLINIC | Age: 74
End: 2024-11-04
Payer: COMMERCIAL

## 2024-11-04 NOTE — TELEPHONE ENCOUNTER
TCT patient and pre med protocol reviewed. Patient states she picked up prednisone and will follow instructions as ordered. No questions at this time

## 2024-11-04 NOTE — TELEPHONE ENCOUNTER
Patient for LE angiogram on Wed. Orders to premedicate with prednisone and benadryl at last office visit. TCT patient and left VMM reminder and to call the office with any questions

## 2024-11-05 ENCOUNTER — OFFICE VISIT (OUTPATIENT)
Dept: PULMONOLOGY | Facility: CLINIC | Age: 74
End: 2024-11-05
Payer: COMMERCIAL

## 2024-11-05 VITALS
HEIGHT: 63 IN | SYSTOLIC BLOOD PRESSURE: 115 MMHG | DIASTOLIC BLOOD PRESSURE: 73 MMHG | TEMPERATURE: 97.2 F | OXYGEN SATURATION: 95 % | HEART RATE: 65 BPM | BODY MASS INDEX: 30.76 KG/M2 | WEIGHT: 173.6 LBS

## 2024-11-05 DIAGNOSIS — J84.10 PULMONARY FIBROSIS (MULTI): ICD-10-CM

## 2024-11-05 DIAGNOSIS — Z91.041 HISTORY OF ALLERGY TO RADIOGRAPHIC CONTRAST MEDIA: Primary | ICD-10-CM

## 2024-11-05 PROCEDURE — 4010F ACE/ARB THERAPY RXD/TAKEN: CPT | Performed by: INTERNAL MEDICINE

## 2024-11-05 PROCEDURE — 3050F LDL-C >= 130 MG/DL: CPT | Performed by: INTERNAL MEDICINE

## 2024-11-05 PROCEDURE — 99213 OFFICE O/P EST LOW 20 MIN: CPT | Performed by: INTERNAL MEDICINE

## 2024-11-05 PROCEDURE — 3078F DIAST BP <80 MM HG: CPT | Performed by: INTERNAL MEDICINE

## 2024-11-05 PROCEDURE — 3074F SYST BP LT 130 MM HG: CPT | Performed by: INTERNAL MEDICINE

## 2024-11-05 PROCEDURE — 3008F BODY MASS INDEX DOCD: CPT | Performed by: INTERNAL MEDICINE

## 2024-11-05 PROCEDURE — 3044F HG A1C LEVEL LT 7.0%: CPT | Performed by: INTERNAL MEDICINE

## 2024-11-05 PROCEDURE — 1159F MED LIST DOCD IN RCRD: CPT | Performed by: INTERNAL MEDICINE

## 2024-11-05 PROCEDURE — 1126F AMNT PAIN NOTED NONE PRSNT: CPT | Performed by: INTERNAL MEDICINE

## 2024-11-05 RX ORDER — FAMOTIDINE 20 MG/1
20 TABLET, FILM COATED ORAL ONCE
Qty: 1 TABLET | Refills: 0 | Status: SHIPPED | OUTPATIENT
Start: 2024-11-05 | End: 2024-11-06 | Stop reason: HOSPADM

## 2024-11-05 RX ORDER — PREDNISONE 50 MG/1
50 TABLET ORAL DAILY
Qty: 3 TABLET | Refills: 0 | Status: SHIPPED | OUTPATIENT
Start: 2024-11-05 | End: 2024-11-06 | Stop reason: HOSPADM

## 2024-11-05 ASSESSMENT — COPD QUESTIONNAIRES
QUESTION1_COUGHFREQUENCY: 3
QUESTION6_LEAVINGHOUSE: 0 - I AM CONFIDENT LEAVING MY HOME DESPITE MY LUNG CONDITION
CAT_TOTALSCORE: 17
QUESTION5_HOMEACTIVITIES: 2
QUESTION2_CHESTPHLEGM: 3
QUESTION8_ENERGYLEVEL: 4
QUESTION3_CHESTTIGHTNESS: 0 - MY CHEST DOES NOT FEEL TIGHT AT ALL
QUESTION4_WALKINCLINE: 0 - WHEN I WALK UP A HILL OR ONE FLIGHT OF STAIRS I AM NOT BREATHLESS
QUESTION7_SLEEPQUALITY: 5 - I DON'T SLEEP SOUNDLY BECAUSE OF MY LUNG CONDITION

## 2024-11-05 ASSESSMENT — ASTHMA QUESTIONNAIRES
QUESTION_5 LAST FOUR WEEKS HOW WOULD YOU RATE YOUR ASTHMA CONTROL: WELL CONTROLLED
QUESTION_4 LAST FOUR WEEKS HOW OFTEN HAVE YOU USED YOUR RESCUE INHALER OR NEBULIZER MEDICATION (SUCH AS ALBUTEROL): ONCE A WEEK OR LESS
QUESTION_2 LAST FOUR WEEKS HOW OFTEN HAVE YOU HAD SHORTNESS OF BREATH: NOT AT ALL
ACT_TOTALSCORE: 23
QUESTION_1 LAST FOUR WEEKS HOW MUCH OF THE TIME DID YOUR ASTHMA KEEP YOU FROM GETTING AS MUCH DONE AT WORK, SCHOOL OR AT HOME: NONE OF THE TIME
QUESTION_3 LAST FOUR WEEKS HOW OFTEN DID YOUR ASTHMA SYMPTOMS (WHEEZING, COUGHING, SHORTNESS OF BREATH, CHEST TIGHTNESS OR PAIN) WAKE YOU UP AT NIGHT OR EARLIER THAN USUAL IN THE MORNING: NOT AT ALL

## 2024-11-05 ASSESSMENT — PAIN SCALES - GENERAL: PAINLEVEL_OUTOF10: 0-NO PAIN

## 2024-11-05 NOTE — PATIENT INSTRUCTIONS
Assessment and Plan / Recommendations:  Problem List Items Addressed This Visit       Lung fibrosis (Multi)    Current Assessment & Plan     Found on recent wedge biopsy with hamartoma resection- possible environmental, smoke, prior breast radiation related    Go for updated Full PFTs and 6MWT    See Dr. Browning as a new patient next visit to further discuss if any further testing/treatments needed      Office #[phone], can use  option or nurses line   [place] Alanna Gallegos # [phone]     Radiology Scheduling #[phone]          RTC 3 months or sooner with PFT 6MWT results     Continue yearly LCS CT chest with Dr. Bansal, we can take over whenever she wants us to

## 2024-11-05 NOTE — PROGRESS NOTES
Department of Medicine  Division of Pulmonary, Critical Care, and Sleep Medicine  Consultation  [address] [facility]    I was asked by Raymond Carnes MD to evaluate Serjio Nunes for interstitial lung disease. I have independently interviewed and examined the patient in the office and reviewed available records.    Physician HPI (11/5/2024):    PCP- Dr. Carnes     CC: abnormal chest imaging    73 yo F smoker here with daughter, referred by PCP for ILD evaluation.     Patient has seen Vanesa Whaley in the past for lung condition- was undergoing LCS imaging, had CARLOS nodule that Dr. Bansal resected and found to have hamartoma, will continue to have LCS.     Wedge Biopsy-   FINAL DIAGNOSIS   A. LUNG,  RIGHT; WEDGE RESECTION:  -- Pulmonary hamartoma (1.3 cm), completely resected.  -- Background lung parenchyma with patchy fibrosing interstitial pneumonia. See comment            Sent to my clinic for evaluation of the ILD findings on biopsy.       ROS: occasional cough rare, no fevers, chills, nausea, emesis, diarrhea, chest pain, palpitations, other ros reviewed and negative for complaints.     PFT- 8/2024- reported as normal maida, no significant BD, corrected DLCO normal, no volumes    PMHx: Breast ca s/p resection, chemo, and radiation to Left breast.     PMHx: Breast Cancer  AJCC Edition: 7th, Diagnosis Date: 22-Aug-2013, Stage IA, T1b pN0 M0      Treatment Synopsis:    Breast cancer.   HISTORY OF PRESENT ILLNESS:   She presented with an abnormal mammogram and with excision of a 0.7 cm grade 2 invasive ductal carcinoma with her final surgery on August 22, 2013. It was a triple negative tumor with 4 negative sentinel lymph nodes. It was a stage 1 node-negative breast  cancer.   TREATMENT SUMMARY:   1. Left lumpectomy and sentinel node evaluation done on August 22, 2013.   2. She had 1 cycle of docetaxel and cyclophosphamide on September 27, 2013, complicated by myopathy, near syncope, diarrhea,  and hospital visit with a decline in her health performance.   3. She then went on to 10 weekly infusions of Taxol at 80 mg/sq m from October 25, 2013, through December 20, 2013.   4. s/p left breast radiation completed on March 7, 2014  FAMILY HISTORY: Her mother has a rectal cancer at age 32. There are limited family members on her maternal side otherwise. She has had a negative genetics evaluation showing a BRCA 2 mutation of unclear significance.  CURRENT TREATMENT: Observation.     Social Hx: Lives with daughter and family, smokes down to 1 pack per week, used to smoke 1/2 ppd x 30 years, and past 10 years 1 ppw.  Occasional mj, no other etoh drugs.  Has guinea pigs    .  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, used to also work as a nurse, bus driving     Allergies and Intolerances:       Allergies:         contrast (specific type unknown): Contrast, Anaphylaxis,  Active         contrast media (iodine-based): Drug Category, Anaphylaxis, Active         Latex: Latex, Rash, Active         Strawberry: Food, Throat Swelling, Angioedema, Active    Immunization History:  Immunization History   Administered Date(s) Administered    Flu vaccine (IIV4), preservative free *Check age/dose* 10/16/2015, 10/15/2018, 11/05/2019, 11/03/2020, 10/08/2021    Flu vaccine, trivalent, preservative free, HIGH-DOSE, age 65y+ (Fluzone) 11/14/2022    Influenza, Unspecified 10/14/2010, 11/03/2014, 09/29/2017    Influenza, seasonal, injectable 11/28/2011    Pfizer Purple Cap SARS-CoV-2 10/25/2021, 11/15/2021    Pneumococcal conjugate vaccine, 13-valent (PREVNAR 13) 12/18/2019    Pneumococcal polysaccharide vaccine, 23-valent, age 2 years and older (PNEUMOVAX 23) 10/05/2017       Problem List:  Patient Active Problem List   Diagnosis    Abdominal mass    Abnormal breath sounds    Abnormal glucose    Acute frontal sinusitis    Acute kidney injury (CMS-HCC)    Acute on chronic diastolic congestive heart failure     Allergic reaction    Anemia    Asthma    Obstructive sleep apnea    Sleep apnea    Atypical chest pain    Bilateral swelling of feet    Bipolar disorder, current episode manic without psychotic features    Burning sensation of feet    Cellulitis of left lower extremity    Cervical disc herniation    Chronic diarrhea of unknown origin    Chronic venous insufficiency    Cough    Degeneration of intervertebral disc at L5-S1 level    Degeneration of lumbar or lumbosacral intervertebral disc    WITT (dyspnea on exertion)    Dyspareunia, female    Esophageal reflux    Falls    Cervical pain    Fibromyalgia    Flu-like symptoms    Fungal infection    Gout    Abdominal pain    Back pain    Greater trochanteric bursitis of right hip    Pain of left lower extremity    HLD (hyperlipidemia)    Hypersomnia due to medical condition    Hypertension    Hypokalemia    Incisional hernia    Insomnia    Insufficient sleep syndrome    Knee pain    Abdominal pain, RLQ (right lower quadrant)    Left lower quadrant pain    Lipoma    Lipoma of upper extremity    Malignant neoplasm of upper outer quadrant of female breast    Mammogram abnormal    Neuropathy, lower extremity    Nicotine dependence    Nonrheumatic aortic valve insufficiency    Osteoarthritis, lower leg, localized    Pelvic rash    Primary localized osteoarthritis of both hips    Primary osteoarthritis of left knee    Primary osteoarthritis of right knee    Pseudogout    Pure hypercholesterolemia    Right hip pain    RLS (restless legs syndrome)    Arthritis    Synovitis    Diabetes mellitus due to underlying condition, controlled, with diabetic nephropathy, without long-term current use of insulin    C6 radiculopathy    Hernia, abdominal    Lumbar radiculopathy    Ventral hernia    Vitamin D deficiency    Wound of abdomen    Allergic rhinitis    Cannabis abuse    Hypertensive heart disease with heart failure    Moderate bipolar I disorder, most recent episode depressed  (Multi)    Obesity (BMI 30-39.9)    Chronic diastolic (congestive) heart failure    Anxiety    Bipolar disorder    Chronic obstructive lung disease (Multi)    Dyslipidemia    Acquired absence of both cervix and uterus    Restless leg syndrome    Personal history of nicotine dependence    Personal history of malignant neoplasm of breast    Chronic left shoulder pain    Long term (current) use of systemic steroids    Long term (current) use of oral hypoglycemic drugs    Long-term current use of opiate analgesic    Long term (current) use of antibiotics    Gastroesophageal reflux disease without esophagitis    Osteoarthritis of left shoulder due to rotator cuff injury    Sinusitis    Pain in extremity    Absent pulse in lower extremity    Nausea & vomiting    Gastritis without bleeding    Thyroid mass    Lung nodules    Constipation    Atypical pneumonia    Kidney mass    Mass of left breast    CKD (chronic kidney disease)    PVD (peripheral vascular disease) (CMS-HCC)    History of breast cancer    Pulmonary fibrosis (Multi)    PAD (peripheral artery disease) (CMS-HCC)       Family History:  Family History   Problem Relation Name Age of Onset    Cancer Mother          COLORECTAL    Aneurysm Mother      Stroke Mother      Arthritis Father      Hypertension Father      Aneurysm Father         Social History:  Social History     Socioeconomic History    Marital status:    Tobacco Use    Smoking status: Every Day     Current packs/day: 0.00     Average packs/day: 0.5 packs/day for 48.0 years (24.0 ttl pk-yrs)     Types: Cigarettes     Start date:      Last attempt to quit: 2013     Years since quittin.8    Smokeless tobacco: Never   Vaping Use    Vaping status: Never Used   Substance and Sexual Activity    Alcohol use: Not Currently     Comment: rare    Drug use: Yes     Types: Marijuana     Comment: Smokes daily no other drug hx    Sexual activity: Defer     Social Drivers of Health     Financial  Resource Strain: Low Risk  (9/13/2024)    Overall Financial Resource Strain (CARDIA)     Difficulty of Paying Living Expenses: Not hard at all   Food Insecurity: Not on File (9/26/2024)    Received from [facility]    Food Insecurity     Food: 0   Recent Concern: Food Insecurity - Food Insecurity Present (7/11/2024)    Hunger Vital Sign     Worried About Running Out of Food in the Last Year: Sometimes true     Ran Out of Food in the Last Year: Sometimes true   Transportation Needs: No Transportation Needs (9/13/2024)    PRAPARE - Transportation     Lack of Transportation (Medical): No     Lack of Transportation (Non-Medical): No   Physical Activity: Insufficiently Active (9/12/2024)    Exercise Vital Sign     Days of Exercise per Week: 7 days     Minutes of Exercise per Session: 20 min   Stress: No Stress Concern Present (9/12/2024)    [place] [employer] - Occupational Stress Questionnaire     Feeling of Stress : Not at all   Social Connections: Socially Isolated (9/12/2024)    Social Connection and Isolation Panel [NHANES]     Frequency of Communication with Friends and Family: More than three times a week     Frequency of Social Gatherings with Friends and Family: More than three times a week     Attends Church Services: Never     Active Member of Clubs or Organizations: No     Attends Club or Organization Meetings: Never     Marital Status:    Intimate Partner Violence: Unknown (9/12/2024)    Humiliation, Afraid, Rape, and Kick questionnaire     Fear of Current or Ex-Partner: No     Emotionally Abused: No     Physically Abused: No   Housing Stability: Low Risk  (9/13/2024)    Housing Stability Vital Sign     Unable to Pay for Housing in the Last Year: No     Number of Times Moved in the Last Year: 1     Homeless in the Last Year: No       Current Medications:  Current Outpatient Medications   Medication Instructions    amLODIPine (Norvasc) 10 mg tablet TAKE 1 TABLET BY MOUTH EVERY DAY     aspirin 81 mg EC tablet 1 tablet, Daily    atorvastatin (LIPITOR) 40 mg, oral, Daily    busPIRone (BUSPAR) 5 mg, oral, 2 times daily PRN    carvedilol (Coreg) 25 mg tablet TAKE 1 TABLET BY MOUTH TWICE A DAY    cholecalciferol (VITAMIN D-3) 2,000 Units, Daily RT    colchicine 0.6 mg, oral, Daily    diphenhydrAMINE (BENADryl) 50 mg capsule Take one capsule (50 mg) by mouth one hour before contrast medium administration    diphenhydrAMINE (BENADRYL) 50 mg, oral, Once, Administer one hour prior to procedure with contrast (6:30a on 11/6/24)    empagliflozin (JARDIANCE) 25 mg, oral, Daily    famotidine (PEPCID) 20 mg, oral, Once, Administer one hour prior to procedure with contrast (6:30a on 11/6/24)    fluocinonide 0.05 % cream 2 times daily    fluticasone (Flonase) 50 mcg/actuation nasal spray 1 spray, Daily    fluticasone propion-salmeteroL (Advair Diskus) 250-50 mcg/dose diskus inhaler 1 puff, 2 times daily RT    FreeStyle Jaycob 14 Day Sensor kit APPLY SENSOR TO BACK UPPER ARM REMOVE AND REPLACE EVERY 14 DAYS USE WITH DEVICE    furosemide (LASIX) 20 mg, oral, Daily    Klor-Con M10 10 mEq ER tablet 10 mEq, oral, Daily    losartan (COZAAR) 50 mg, oral, Daily    miconazole (Micotin) 2 % cream miconazole nitrate 2 % topical cream   APPLY SPARINGLY TO AFFECTED AREA TWICE A DAY    nitroglycerin (NITROSTAT) 0.4 mg, sublingual, Every 5 min PRN    omeprazole OTC (PRILOSEC OTC) 20 mg, oral, Daily, Do not crush, chew, or split.    predniSONE (DELTASONE) 50 mg, oral, Daily, Administer 13 hours, 7 hours, and 1 hour prior to procedure (6:30p on 11/5/24, 12:30a on 11/6/24, 6:30a on 11/6/24)    tiZANidine (ZANAFLEX) 4 mg, oral, Every 6 hours PRN    Tradjenta 5 mg, oral, Daily, as directed        Drug Allergies/Intolerances:  Allergies   Allergen Reactions    Iodinated Contrast Media Anaphylaxis    Strawberry Angioedema    Iodine Swelling    Latex Rash        Review of Systems:    All other review of systems are negative and/or  "non-contributory.    Physical Examination:  /73   Pulse 65   Temp 36.2 °C (97.2 °F)   Ht 1.6 m (5' 3\")   Wt 78.7 kg (173 lb 9.6 oz)   SpO2 95%   BMI 30.75 kg/m²      General: ambulated independently; no acute distress; well-nourished; work of breathing was not increased; normal vocal character  HEENT: normocephalic; anicteric sclerae; conjunctivae not injected; nasal mucosa was unremarkable; oropharynx was clear without evidence of thrush; dentition was good.  Neck: supple; no lymphadenopathy or thyromegaly.  Chest: clear to auscultation bilaterally; no chest wall deformity.  Cardiac: regular rhythm; no gallop or murmur.  Extremities: no leg edema; no digital clubbing;   Psychiatric: did not appear depressed or anxious.    Pulmonary Function Test Results   Above    Chest Radiograph     XR chest 2 views 10/01/2024    Narrative  Interpreted By:  Isiah Sabillon,  STUDY:  XR CHEST 2 VIEWS; 10/1/2024 9:39 am    INDICATION:  Signs/Symptoms:post op follow up    COMPARISON:  09/13/2024.    ACCESSION NUMBER(S):  [id number]    ORDERING CLINICIAN:  RONDA HENRY    TECHNIQUE:  Number of films: Two-view radiographs of the chest were obtained.    FINDINGS:  The heart and mediastinum are normal.  Previously seen minute right apical pneumothorax has resolved.  Surgical sutures are again seen in the right midlung, with interval  almost complete resolution of previously seen associated postsurgical  consolidation/hematoma. No pleural effusions are seen.  The osseous structures are unchanged; hardware fuses the lower  cervical spine and thoracocervical junction posteriorly.    Impression  Resolution of previously seen minute right apical pneumothorax and  resolving postsurgical hematoma in the right midlung.    Signed by: Isiah Sabillon 10/2/2024 9:44 AM  Dictation workstation:   [id number]      Echocardiogram     No results found for this or any previous visit from the past 365 days.       Chest CT Scan "     7/26/2024  Narrative & Impression   Interpreted By:  Dallas Gold and Ebai Jerky   STUDY:  CT CHEST WO IV CONTRAST;  7/26/2024 8:18 am      INDICATION:  Signs/Symptoms:lung nodule surveillance.      Per EMR: Patient with a history of smoking, left breast cancer status  post lumpectomy and chemoradiation, acute on chronic diastolic heart  failure presenting for follow-up of a lung nodule seen on CT MRI  cardiac dated 03/07/2024..      COMPARISON:  CT chest abdomen pelvis 08/03/2020.      ACCESSION NUMBER(S):  [id number]      ORDERING CLINICIAN:  PAYAL MCDONALD      TECHNIQUE:  Contiguous axial images of the chest and upper abdomen were obtained  without contrast. after the intravenous administration of iodinated  contrast. Coronal and sagittal reformatted images were reconstructed  from the axial data.      FINDINGS:          MEDIASTINUM AND LYMPH NODES: Heterogeneous enlarged appearance of the  thyroid gland containing multiple hypodense thyroid nodules. The  esophageal wall appears within normal limits.  No enlarged  intrathoracic or axillary lymph nodes by imaging criteria. No  pneumomediastinum.      VESSELS:  Normal caliber thoracic aorta . Mild aortic atherosclerosis.      HEART: Normal in size.  Mild coronary artery calcifications. No  significant pericardial effusion.      LUNG, AIRWAYS, AND PLEURA: Subpleural reticular opacities within the  anterior aspect of the left upper lobe, most compatible with post  radiation changes. There are patchy ground-glass opacities scattered  throughout the bilateral upper lobes and right middle lobe and to a  lesser extent the bilateral lower lobes. No consolidation, pulmonary  edema, pleural effusion or pneumothorax.      There is a 1.0 x 0.8 cm subpleural anterior right upper lobe  pulmonary nodule (series 205, image 95) consistent with a pulmonary  nodule seen on the MRI cardiac scoring from 03/07/2024. Comparison to  the MRI study is limited due to the difference  in imaging technique,  however it appears to have increased in size where it measured 0.7 x  0.5 cm.      OSSEOUS STRUCTURES: No acute osseous abnormality. Mild diffuse  osseous demineralization. Visualized surgical changes within the T1  vertebral body      CHEST WALL SOFT TISSUES: Postsurgical changes of left lumpectomy.  There is a 1.3 cm soft tissue nodule within the left breast.      UPPER ABDOMEN/OTHER:      Multiple partially visualized left renal cystic lesions, likely  represent simple cysts. Additionally, there multiple subcentimeter  left renal cyst which are hyperdense and are incompletely  characterized in the basis of this examination, 1 of which is new  from the CT abdomen pelvis from 08/03/2022 measuring 1.2 cm (series  202, image 284).      IMPRESSION:  1. There is a 1.0 x 0.8 cm subpleural anterior right upper lobe  pulmonary nodule, consistent with the nodule seen on MRI cardiac  scoring dated 03/07/2024. Comparison to the MRI examination is  limited due to the difference in imaging modality, however it appears  to have increased in size. Further evaluation with a PET scan and/or  tissue biopsy is recommended. No thoracic lymphadenopathy.  2. Scattered ground-glass opacities throughout bilateral lungs  suspicious for multifocal infectious/inflammation.  3. Heterogeneous enlarged appearance of the thyroid gland with  multiple hypodense nodule, recommend correlation with thyroid  ultrasound for multinodular goiter.  4. Postsurgical changes of left lumpectomy. There is a 1.3 cm nodular  soft tissue density within the left breast, recommend correlation  with breast mammogram.  5. Multiple partially visualized simple left renal cysts and a new  incompletely characterized hypodense lesion measuring 1.2 cm. Further  evaluation with an MRI renal protocol is recommended.          I personally reviewed the images/study and I agree with the findings  as stated by Resident Gemini Rob. This study was  interpreted at  [facility], [place], [place].          MACRO:  None.      Signed by: Dallas Gold 7/27/2024 4:46 PM  Dictation workstation:   [id number]          Assessment and Plan / Recommendations:  Problem List Items Addressed This Visit       Lung fibrosis (Multi)    Current Assessment & Plan     Found on recent wedge biopsy with hamartoma resection- possible environmental, smoke, prior breast radiation related    Go for updated Full PFTs and 6MWT    See Dr. Browning as a new patient next visit to further discuss if any further testing/treatments needed      Office #[phone], can use  option or nurses line    Alanna Gallegos # [phone]     Radiology Scheduling #[phone]          RTC 3 months or sooner with PFT 6MWT results     Continue yearly LCS CT chest with Dr. Bansal, we can take over whenever she wants us to    Follow-up: Visit date not found     Lilly Tobias MD  11/05/2024

## 2024-11-05 NOTE — ASSESSMENT & PLAN NOTE
Found on recent wedge biopsy with hamartoma resection- possible environmental, smoke, prior breast radiation related    Go for updated Full PFTs and 6MWT    See Dr. Browning as a new patient next visit to further discuss if any further testing/treatments needed      Office #[phone], can use  option or nurses line    Alanna Gallegos # [phone]     Radiology Scheduling #[phone]

## 2024-11-05 NOTE — PROGRESS NOTES
Patient is allergic to IV contrast.  Prescriptions for prednisone, Benadryl, Pepcid sent to pharmacy on file.  Nursing will call patient with instructions.

## 2024-11-06 ENCOUNTER — HOSPITAL ENCOUNTER (OUTPATIENT)
Facility: HOSPITAL | Age: 74
Setting detail: OUTPATIENT SURGERY
Discharge: HOME | End: 2024-11-06
Attending: HOSPITALIST | Admitting: HOSPITALIST
Payer: COMMERCIAL

## 2024-11-06 ENCOUNTER — PHARMACY VISIT (OUTPATIENT)
Dept: PHARMACY | Facility: CLINIC | Age: 74
End: 2024-11-06
Payer: COMMERCIAL

## 2024-11-06 VITALS
BODY MASS INDEX: 30.65 KG/M2 | HEIGHT: 63 IN | TEMPERATURE: 97.9 F | DIASTOLIC BLOOD PRESSURE: 66 MMHG | OXYGEN SATURATION: 100 % | WEIGHT: 173 LBS | HEART RATE: 67 BPM | RESPIRATION RATE: 15 BRPM | SYSTOLIC BLOOD PRESSURE: 143 MMHG

## 2024-11-06 DIAGNOSIS — I70.221 ATHEROSCLEROSIS OF NATIVE ARTERIES OF EXTREMITIES WITH REST PAIN, RIGHT LEG (MULTI): ICD-10-CM

## 2024-11-06 DIAGNOSIS — K21.9 ESOPHAGEAL REFLUX: ICD-10-CM

## 2024-11-06 DIAGNOSIS — I73.9 PAD (PERIPHERAL ARTERY DISEASE) (CMS-HCC): Primary | ICD-10-CM

## 2024-11-06 DIAGNOSIS — Z95.820 STATUS POST PERIPHERAL ARTERY ANGIOPLASTY WITH INSERTION OF STENT: ICD-10-CM

## 2024-11-06 LAB
ACT BLD: 258 SEC (ref 83–199)
ACT BLD: 273 SEC (ref 83–199)
ACT BLD: 322 SEC (ref 83–199)
ACT BLD: 331 SEC (ref 83–199)
ANION GAP SERPL CALC-SCNC: 16 MMOL/L (ref 10–20)
BUN SERPL-MCNC: 22 MG/DL (ref 6–23)
CALCIUM SERPL-MCNC: 9.6 MG/DL (ref 8.6–10.3)
CHLORIDE SERPL-SCNC: 103 MMOL/L (ref 98–107)
CO2 SERPL-SCNC: 20 MMOL/L (ref 21–32)
CREAT SERPL-MCNC: 1.51 MG/DL (ref 0.5–1.05)
EGFRCR SERPLBLD CKD-EPI 2021: 37 ML/MIN/1.73M*2
ERYTHROCYTE [DISTWIDTH] IN BLOOD BY AUTOMATED COUNT: 14 % (ref 11.5–14.5)
GLUCOSE BLD MANUAL STRIP-MCNC: 249 MG/DL (ref 74–99)
GLUCOSE SERPL-MCNC: 253 MG/DL (ref 74–99)
HCT VFR BLD AUTO: 41.7 % (ref 36–46)
HGB BLD-MCNC: 13.6 G/DL (ref 12–16)
MCH RBC QN AUTO: 30.2 PG (ref 26–34)
MCHC RBC AUTO-ENTMCNC: 32.6 G/DL (ref 32–36)
MCV RBC AUTO: 93 FL (ref 80–100)
NRBC BLD-RTO: 0 /100 WBCS (ref 0–0)
PLATELET # BLD AUTO: 293 X10*3/UL (ref 150–450)
POTASSIUM SERPL-SCNC: 3.4 MMOL/L (ref 3.5–5.3)
RBC # BLD AUTO: 4.5 X10*6/UL (ref 4–5.2)
SODIUM SERPL-SCNC: 136 MMOL/L (ref 136–145)
WBC # BLD AUTO: 12.6 X10*3/UL (ref 4.4–11.3)

## 2024-11-06 PROCEDURE — 75774 ARTERY X-RAY EACH VESSEL: CPT | Mod: 59 | Performed by: HOSPITALIST

## 2024-11-06 PROCEDURE — 82947 ASSAY GLUCOSE BLOOD QUANT: CPT | Mod: 59

## 2024-11-06 PROCEDURE — C1876 STENT, NON-COA/NON-COV W/DEL: HCPCS | Performed by: HOSPITALIST

## 2024-11-06 PROCEDURE — 37224 HC REVASCULARIZE FEM/POP ARTERY,ANGIOPLASTY: CPT

## 2024-11-06 PROCEDURE — 75710 ARTERY X-RAYS ARM/LEG: CPT | Mod: 59 | Performed by: HOSPITALIST

## 2024-11-06 PROCEDURE — 7100000010 HC PHASE TWO TIME - EACH INCREMENTAL 1 MINUTE: Performed by: HOSPITALIST

## 2024-11-06 PROCEDURE — 2550000001 HC RX 255 CONTRASTS: Performed by: HOSPITALIST

## 2024-11-06 PROCEDURE — C1894 INTRO/SHEATH, NON-LASER: HCPCS | Performed by: HOSPITALIST

## 2024-11-06 PROCEDURE — 37221 HC REVASCULARIZE ILIAC ARTERY,ANGIOPLASTY/STENT, INITIAL VESSEL: CPT | Performed by: HOSPITALIST

## 2024-11-06 PROCEDURE — 37221 PR REVSC OPN/PRQ ILIAC ART W/STNT PLMT & ANGIOPLSTY: CPT | Performed by: HOSPITALIST

## 2024-11-06 PROCEDURE — 80048 BASIC METABOLIC PNL TOTAL CA: CPT | Performed by: NURSE PRACTITIONER

## 2024-11-06 PROCEDURE — C1887 CATHETER, GUIDING: HCPCS | Performed by: HOSPITALIST

## 2024-11-06 PROCEDURE — 2500000005 HC RX 250 GENERAL PHARMACY W/O HCPCS: Performed by: HOSPITALIST

## 2024-11-06 PROCEDURE — 99153 MOD SED SAME PHYS/QHP EA: CPT | Performed by: HOSPITALIST

## 2024-11-06 PROCEDURE — 85027 COMPLETE CBC AUTOMATED: CPT | Performed by: NURSE PRACTITIONER

## 2024-11-06 PROCEDURE — 99152 MOD SED SAME PHYS/QHP 5/>YRS: CPT | Performed by: HOSPITALIST

## 2024-11-06 PROCEDURE — 36415 COLL VENOUS BLD VENIPUNCTURE: CPT | Performed by: NURSE PRACTITIONER

## 2024-11-06 PROCEDURE — C1769 GUIDE WIRE: HCPCS | Performed by: HOSPITALIST

## 2024-11-06 PROCEDURE — 37252 INTRVASC US NONCORONARY 1ST: CPT | Performed by: HOSPITALIST

## 2024-11-06 PROCEDURE — 99153 MOD SED SAME PHYS/QHP EA: CPT

## 2024-11-06 PROCEDURE — C1766 INTRO/SHEATH,STRBLE,NON-PEEL: HCPCS | Performed by: HOSPITALIST

## 2024-11-06 PROCEDURE — RXMED WILLOW AMBULATORY MEDICATION CHARGE

## 2024-11-06 PROCEDURE — 2780000003 HC OR 278 NO HCPCS: Performed by: HOSPITALIST

## 2024-11-06 PROCEDURE — 92978 ENDOLUMINL IVUS OCT C 1ST: CPT | Mod: RT | Performed by: HOSPITALIST

## 2024-11-06 PROCEDURE — 37224 HC REVASCULARIZE FEM/POP ARTERY,ANGIOPLASTY: CPT | Performed by: HOSPITALIST

## 2024-11-06 PROCEDURE — 2500000004 HC RX 250 GENERAL PHARMACY W/ HCPCS (ALT 636 FOR OP/ED): Performed by: HOSPITALIST

## 2024-11-06 PROCEDURE — C1725 CATH, TRANSLUMIN NON-LASER: HCPCS | Performed by: HOSPITALIST

## 2024-11-06 PROCEDURE — 37224 PR REVSC OPN/PRG FEM/POP W/ANGIOPLASTY UNI: CPT | Performed by: HOSPITALIST

## 2024-11-06 PROCEDURE — 75774 ARTERY X-RAY EACH VESSEL: CPT | Performed by: HOSPITALIST

## 2024-11-06 PROCEDURE — 2500000002 HC RX 250 W HCPCS SELF ADMINISTERED DRUGS (ALT 637 FOR MEDICARE OP, ALT 636 FOR OP/ED): Performed by: NURSE PRACTITIONER

## 2024-11-06 PROCEDURE — 99152 MOD SED SAME PHYS/QHP 5/>YRS: CPT

## 2024-11-06 PROCEDURE — 37221 HC REVASCULARIZE ILIAC ARTERY,ANGIOPLASTY/STENT, INITIAL VESSEL: CPT

## 2024-11-06 PROCEDURE — G0269 OCCLUSIVE DEVICE IN VEIN ART: HCPCS | Mod: 59 | Performed by: HOSPITALIST

## 2024-11-06 PROCEDURE — 2720000007 HC OR 272 NO HCPCS: Performed by: HOSPITALIST

## 2024-11-06 PROCEDURE — 2500000001 HC RX 250 WO HCPCS SELF ADMINISTERED DRUGS (ALT 637 FOR MEDICARE OP): Performed by: HOSPITALIST

## 2024-11-06 PROCEDURE — 7100000009 HC PHASE TWO TIME - INITIAL BASE CHARGE: Performed by: HOSPITALIST

## 2024-11-06 PROCEDURE — 76937 US GUIDE VASCULAR ACCESS: CPT | Mod: LT | Performed by: HOSPITALIST

## 2024-11-06 PROCEDURE — C1753 CATH, INTRAVAS ULTRASOUND: HCPCS | Performed by: HOSPITALIST

## 2024-11-06 PROCEDURE — C1760 CLOSURE DEV, VASC: HCPCS | Performed by: HOSPITALIST

## 2024-11-06 PROCEDURE — 85347 COAGULATION TIME ACTIVATED: CPT

## 2024-11-06 PROCEDURE — 37252 INTRVASC US NONCORONARY 1ST: CPT

## 2024-11-06 PROCEDURE — C2623 CATH, TRANSLUMIN, DRUG-COAT: HCPCS | Performed by: HOSPITALIST

## 2024-11-06 PROCEDURE — 75710 ARTERY X-RAYS ARM/LEG: CPT | Performed by: HOSPITALIST

## 2024-11-06 DEVICE — ABSOLUTE PRO VASCULAR SELF-EXPANDING STENT SYSTEM 9.0 MM X 40 MM X 135 CM / OVER-THE-WIRE
Type: IMPLANTABLE DEVICE | Site: ILIAC CREST | Status: FUNCTIONAL
Brand: ABSOLUTE PRO

## 2024-11-06 RX ORDER — HYDRALAZINE HYDROCHLORIDE 20 MG/ML
INJECTION INTRAMUSCULAR; INTRAVENOUS AS NEEDED
Status: DISCONTINUED | OUTPATIENT
Start: 2024-11-06 | End: 2024-11-06 | Stop reason: HOSPADM

## 2024-11-06 RX ORDER — DEXTROSE 50 % IN WATER (D50W) INTRAVENOUS SYRINGE
12.5
Status: DISCONTINUED | OUTPATIENT
Start: 2024-11-06 | End: 2024-11-06 | Stop reason: HOSPADM

## 2024-11-06 RX ORDER — PANTOPRAZOLE SODIUM 40 MG/1
40 TABLET, DELAYED RELEASE ORAL
Qty: 90 TABLET | Refills: 0 | Status: SHIPPED | OUTPATIENT
Start: 2024-11-06

## 2024-11-06 RX ORDER — LIDOCAINE HYDROCHLORIDE 10 MG/ML
INJECTION, SOLUTION EPIDURAL; INFILTRATION; INTRACAUDAL; PERINEURAL AS NEEDED
Status: DISCONTINUED | OUTPATIENT
Start: 2024-11-06 | End: 2024-11-06 | Stop reason: HOSPADM

## 2024-11-06 RX ORDER — FENTANYL CITRATE 50 UG/ML
INJECTION, SOLUTION INTRAMUSCULAR; INTRAVENOUS AS NEEDED
Status: DISCONTINUED | OUTPATIENT
Start: 2024-11-06 | End: 2024-11-06 | Stop reason: HOSPADM

## 2024-11-06 RX ORDER — MIDAZOLAM HYDROCHLORIDE 1 MG/ML
INJECTION, SOLUTION INTRAMUSCULAR; INTRAVENOUS AS NEEDED
Status: DISCONTINUED | OUTPATIENT
Start: 2024-11-06 | End: 2024-11-06 | Stop reason: HOSPADM

## 2024-11-06 RX ORDER — POTASSIUM CHLORIDE 20 MEQ/1
40 TABLET, EXTENDED RELEASE ORAL ONCE
Status: COMPLETED | OUTPATIENT
Start: 2024-11-06 | End: 2024-11-06

## 2024-11-06 RX ORDER — DEXTROSE 50 % IN WATER (D50W) INTRAVENOUS SYRINGE
25
Status: DISCONTINUED | OUTPATIENT
Start: 2024-11-06 | End: 2024-11-06 | Stop reason: HOSPADM

## 2024-11-06 RX ORDER — PROTAMINE SULFATE 10 MG/ML
INJECTION, SOLUTION INTRAVENOUS CONTINUOUS PRN
Status: COMPLETED | OUTPATIENT
Start: 2024-11-06 | End: 2024-11-06

## 2024-11-06 RX ORDER — NAPROXEN SODIUM 220 MG/1
81 TABLET, FILM COATED ORAL ONCE
Status: DISCONTINUED | OUTPATIENT
Start: 2024-11-06 | End: 2024-11-06 | Stop reason: HOSPADM

## 2024-11-06 RX ORDER — CLOPIDOGREL BISULFATE 75 MG/1
75 TABLET ORAL DAILY
Qty: 90 TABLET | Refills: 0 | Status: SHIPPED | OUTPATIENT
Start: 2024-11-06

## 2024-11-06 RX ORDER — ACETAMINOPHEN 325 MG/1
650 TABLET ORAL EVERY 6 HOURS PRN
Status: DISCONTINUED | OUTPATIENT
Start: 2024-11-06 | End: 2024-11-06 | Stop reason: HOSPADM

## 2024-11-06 RX ORDER — VANCOMYCIN HYDROCHLORIDE 1 G/200ML
INJECTION, SOLUTION INTRAVENOUS CONTINUOUS PRN
Status: COMPLETED | OUTPATIENT
Start: 2024-11-06 | End: 2024-11-06

## 2024-11-06 RX ORDER — CLOPIDOGREL BISULFATE 300 MG/1
TABLET, FILM COATED ORAL AS NEEDED
Status: DISCONTINUED | OUTPATIENT
Start: 2024-11-06 | End: 2024-11-06 | Stop reason: HOSPADM

## 2024-11-06 RX ORDER — HEPARIN SODIUM 1000 [USP'U]/ML
INJECTION, SOLUTION INTRAVENOUS; SUBCUTANEOUS AS NEEDED
Status: DISCONTINUED | OUTPATIENT
Start: 2024-11-06 | End: 2024-11-06 | Stop reason: HOSPADM

## 2024-11-06 RX ORDER — DIPHENHYDRAMINE HYDROCHLORIDE 50 MG/ML
50 INJECTION INTRAMUSCULAR; INTRAVENOUS ONCE
Status: COMPLETED | OUTPATIENT
Start: 2024-11-06 | End: 2024-11-06

## 2024-11-06 ASSESSMENT — PAIN SCALES - GENERAL
PAINLEVEL_OUTOF10: 0 - NO PAIN
PAINLEVEL_OUTOF10: 2
PAINLEVEL_OUTOF10: 0 - NO PAIN
PAINLEVEL_OUTOF10: 0 - NO PAIN
PAINLEVEL_OUTOF10: 1
PAINLEVEL_OUTOF10: 0 - NO PAIN

## 2024-11-06 ASSESSMENT — ENCOUNTER SYMPTOMS
ENDOCRINE NEGATIVE: 1
CONSTITUTIONAL NEGATIVE: 1
ALLERGIC/IMMUNOLOGIC NEGATIVE: 1
HEMATOLOGIC/LYMPHATIC NEGATIVE: 1
EYES NEGATIVE: 1
PSYCHIATRIC NEGATIVE: 1
RESPIRATORY NEGATIVE: 1
CARDIOVASCULAR NEGATIVE: 1
GASTROINTESTINAL NEGATIVE: 1
NEUROLOGICAL NEGATIVE: 1
MUSCULOSKELETAL NEGATIVE: 1

## 2024-11-06 ASSESSMENT — PAIN - FUNCTIONAL ASSESSMENT
PAIN_FUNCTIONAL_ASSESSMENT: 0-10
PAIN_FUNCTIONAL_ASSESSMENT: 0-10

## 2024-11-06 ASSESSMENT — PAIN DESCRIPTION - DESCRIPTORS: DESCRIPTORS: ACHING

## 2024-11-06 NOTE — DISCHARGE INSTRUCTIONS
Please keep hydrated, especially over the next 24 hours (after your procedure). The goal is for you to have a total of 72-96 ounces water/gatorade intake for the next 24 hours. This will help flush the dye from the procedure through your kidneys and prevent kidney injury.     Your omeprazole (prilosec) has been switched to pantoprazole (protonix), a similar medication to omeprazole. The reason for the switch is that omeprazole (prilosec) can decrease the effectiveness of clopidogrel (plavix).           PERIPHERAL ANGIOGRAPHY DISCHARGE INSTRUCTIONS    FOR SUDDEN AND SEVERE CHEST PAIN, SHORTNESS OF BREATH, EXCESSIVE BLEEDING, SIGNS OF STROKE, OR CHANGES IN MENTAL STATUS YOU SHOULD CALL 911 IMMEDIATELY.     If your provider has prescribed aspirin and/or clopidogrel (Plavix), or prasugrel (Effient), or ticagrelor (Brilinta), DO NOT STOP THESE MEDICATIONS for any reason without talking to your cardiologist first. If any of these were prescribed, you must take them every day without missing a single dose. If you are getting low on these medications, contact your provider immediately for a refill.     FOR NEXT 24 HOURS  - Upon discharge, you should return home and rest for the remainder of the day and evening. You do not have to stay on bed rest but should not be very active.  It is recommended a responsible adult be with you for the first 24 hours after the procedure.    - No driving for 24 hours after procedure. Please arrange for someone to drive you home from the hospital today.     - Do not drive, operate machinery, or use power tools for 24 hours after your procedure.     - Do not make any legal decisions for 24 hours after your procedure.     - Do not drink alcoholic beverages for 24 hours after your procedure.    WOUND CARE   *FOR FEMORAL (LEG) ACCESS*  ·      Avoid heavy lifting (over 10 pounds) for 7 days, squatting or excessive bending for 2 days, and strenuous exercise for 7 days.  ·      No submerged bathing,  swimming, or hot tubs for the next 7 days, or until fully healed.  ·      Avoid sexual activity for 3-4 days until any groin discomfort has ceased.     *FOR RADIAL (WRIST) ACCESS*  ·      No lifting more than 5 pounds or excessive use of the wrist for 24 hours - for example, treat your wrist as if it is sprained.  ·      Do not engage in vigorous activities (tennis, golf, bowling, weights) for at least 48 hours after the procedure.  ·      Do not submerge the wrist for 7 days after the procedure.  ·      You should expect mild tingling in your hand and tenderness at the puncture site for up to 3 days.    - The transparent dressing should be removed from the site 24 hours after the procedure.  Wash the site gently with soap and water. Rinse well and pat dry. Keep the area clean and dry. You may apply a Band-Aid to the site. Avoid lotions, ointments, or powders until fully healed.     - You may shower the day after your procedure.      - It is normal to notice a small bruise around the puncture site and/or a small grape sized or smaller lump. Any large bruising or large lump warrants a call to the office.     - If bleeding should occur, lay down and apply pressure to the affected area for 10 minutes.  If the bleeding stops notify your physician.  If there is a large amount of bleeding or spurting of blood CALL 911 immediately.  DO NOT drive yourself to the hospital.    - You may experience some tenderness, bruising or minimal inflammation.  If you have any concerns, you may contact the Cath Lab or if any of these symptoms become excessive, contact your cardiologist or go to the emergency room.     OTHER INSTRUCTIONS  - You may take acetaminophen (Tylenol) as directed for discomfort.  If pain is not relieved with acetaminophen (Tylenol), contact your doctor.    - It can be normal to have slight swelling in the leg the procedure was performed on (not the puncture site leg) post-procedure. Elevate the leg as much as  possible above the level of your heart. Any excessive swelling, warmth or redness to the leg warrants a call to the office.    - If you notice or experience any of the following, you should notify your doctor or seek medical attention  Chest pain or discomfort  Change in mental status or weakness in extremities.  Dizziness, light headedness, or feeling faint.  Change in the site where the procedure was performed, such as bleeding or an increased area of bruising or swelling.  Tingling, numbness, pain, or coolness in the leg/arm beyond the site where the procedure was performed.  Signs of infection (i.e. shaking chills, temperature > 100 degrees Fahrenheit, warmth, redness) in the leg/arm area where the procedure was performed.  Changes in urination   Bloody or black stools  Vomiting blood  Severe nose bleeds

## 2024-11-06 NOTE — H&P
History Of Present Illness  Serjio Nunes is a 74 y.o. female presenting with PAD/CLI right lower extremity, here for peripheral angiogram +/- intervention. PMH includes HTN, HLD, BILL, chronic diastolic HF, contrast allergy, tobacco user, breast CA, OA, bipolar, CKD. Patient reports she took all three doses of prednisone this morning and took pepcid this morning. Patient states she does not think she took benadryl this morning.    Past Medical History:  Past Medical History:   Diagnosis Date    Anemia     Anxiety     Asthma     Bipolar disorder     stable    Breast cancer (Multi) 2013    left breast cancer s/p lumpectomy; chemotherapy and radiation    Cardiology follow-up encounter 03/19/2024    Brody MOON MD    CHF (congestive heart failure)     stable    Chronic venous insufficiency     status post vein ablation    COPD (chronic obstructive pulmonary disease) (Multi)     no O2    Coronary artery disease     , [facility] with non-obstructive CAD on 4/8/16    Diverticulosis     DVT (deep venous thrombosis) (Multi) 1972    right leg, provoked w/ pregnancy    Fibromyalgia     GERD (gastroesophageal reflux disease)     under control    Gout     no recent acute    Hyperlipidemia     Hypertension     Insomnia     Lumbar radiculopathy     Lung nodule     Plan: Thoracoscopy; Right Lung Wedge Resection 9/12/24    Nonrheumatic aortic (valve) insufficiency     Osteoarthritis     RLS (restless legs syndrome)     Sleep apnea     unable tolerate CPAP - sleep with multiple pillows    TIA (transient ischemic attack) 2015    no residual - no recurrence    Type 2 diabetes mellitus     Vitamin D deficiency         Past Surgical History:  Past Surgical History:   Procedure Laterality Date    BREAST BIOPSY      BREAST LUMPECTOMY Left 2013    BUNIONECTOMY Bilateral     CARDIAC CATHETERIZATION  04/08/2016    CERVICAL FUSION      FOOT SURGERY Right     screw great toe    HERNIA REPAIR      ventral    HYSTERECTOMY      LUNG REMOVAL,  PARTIAL Right 09/12/2024    Thoracoscopy; Right Lung Wedge Resection    ROTATOR CUFF REPAIR Left     SOFT TISSUE BIOPSY  08/26/2024    Thyroid    VENOUS ABLATION            Social History:   reports that she has been smoking cigarettes. She started smoking about 59 years ago. She has a 24 pack-year smoking history. She has never used smokeless tobacco. She reports that she does not currently use alcohol. She reports current drug use. Drug: Marijuana.     Family History:  Family History   Problem Relation Name Age of Onset    Cancer Mother          COLORECTAL    Aneurysm Mother      Stroke Mother      Arthritis Father      Hypertension Father      Aneurysm Father          Allergies:  Allergies   Allergen Reactions    Iodinated Contrast Media Anaphylaxis    Strawberry Angioedema    Iodine Swelling    Latex Rash        Home Medications:  Current Outpatient Medications   Medication Instructions    amLODIPine (Norvasc) 10 mg tablet TAKE 1 TABLET BY MOUTH EVERY DAY    aspirin 81 mg EC tablet 1 tablet, Daily    atorvastatin (LIPITOR) 40 mg, oral, Daily    busPIRone (BUSPAR) 5 mg, oral, 2 times daily PRN    carvedilol (Coreg) 25 mg tablet TAKE 1 TABLET BY MOUTH TWICE A DAY    cholecalciferol (VITAMIN D-3) 2,000 Units, Daily RT    colchicine 0.6 mg, oral, Daily    diphenhydrAMINE (BENADryl) 50 mg capsule Take one capsule (50 mg) by mouth one hour before contrast medium administration    diphenhydrAMINE (BENADRYL) 50 mg, oral, Once, Administer one hour prior to procedure with contrast (6:30a on 11/6/24)    empagliflozin (JARDIANCE) 25 mg, oral, Daily    famotidine (PEPCID) 20 mg, oral, Once, Administer one hour prior to procedure with contrast (6:30a on 11/6/24)    fluocinonide 0.05 % cream 2 times daily    fluticasone (Flonase) 50 mcg/actuation nasal spray 1 spray, Daily    fluticasone propion-salmeteroL (Advair Diskus) 250-50 mcg/dose diskus inhaler 1 puff, 2 times daily RT    FreeStyle Jaycob 14 Day Sensor kit APPLY SENSOR  TO BACK UPPER ARM REMOVE AND REPLACE EVERY 14 DAYS USE WITH DEVICE    furosemide (LASIX) 20 mg, oral, Daily    Klor-Con M10 10 mEq ER tablet 10 mEq, oral, Daily    losartan (COZAAR) 50 mg, oral, Daily    miconazole (Micotin) 2 % cream miconazole nitrate 2 % topical cream   APPLY SPARINGLY TO AFFECTED AREA TWICE A DAY    nitroglycerin (NITROSTAT) 0.4 mg, sublingual, Every 5 min PRN    omeprazole OTC (PRILOSEC OTC) 20 mg, oral, Daily, Do not crush, chew, or split.    predniSONE (DELTASONE) 50 mg, oral, Daily, Administer 13 hours, 7 hours, and 1 hour prior to procedure (6:30p on 11/5/24, 12:30a on 11/6/24, 6:30a on 11/6/24)    tiZANidine (ZANAFLEX) 4 mg, oral, Every 6 hours PRN    Tradjenta 5 mg, oral, Daily, as directed       Inpatient Medications:  Scheduled medications   Medication Dose Route Frequency    aspirin  81 mg oral Once     PRN medications   Medication    dextrose    dextrose    glucagon    glucagon     Continuous Medications   Medication Dose Last Rate         Review of Systems   Constitutional: Negative.    HENT: Negative.     Eyes: Negative.    Respiratory: Negative.     Cardiovascular: Negative.    Gastrointestinal: Negative.    Endocrine: Negative.    Genitourinary: Negative.    Musculoskeletal: Negative.    Skin: Negative.    Allergic/Immunologic: Negative.    Neurological: Negative.    Hematological: Negative.    Psychiatric/Behavioral: Negative.            Physical Exam  Constitutional:       General: She is awake. She is not in acute distress.     Appearance: She is not ill-appearing.   Cardiovascular:      Rate and Rhythm: Normal rate and regular rhythm.      Pulses:           Radial pulses are 2+ on the right side and 2+ on the left side.        Dorsalis pedis pulses are detected w/ Doppler on the left side.        Posterior tibial pulses are detected w/ Doppler on the right side.      Heart sounds: Normal heart sounds. No murmur heard.  Pulmonary:      Effort: Pulmonary effort is normal.       "Breath sounds: Normal breath sounds and air entry.   Abdominal:      General: Bowel sounds are normal.      Palpations: Abdomen is soft.      Tenderness: There is no abdominal tenderness.   Musculoskeletal:      Right lower leg: No edema.      Left lower leg: No edema.   Skin:     General: Skin is warm and dry.   Neurological:      General: No focal deficit present.      Mental Status: She is alert and oriented to person, place, and time.      GCS: GCS eye subscore is 4. GCS verbal subscore is 5. GCS motor subscore is 6.   Psychiatric:         Mood and Affect: Mood normal.         Behavior: Behavior is cooperative.        Sedation Plan    ASA 3     Mallampati class: III.           NPO since 0000 today    Last Recorded Vitals  Blood pressure 144/81, pulse 65, temperature 36 °C (96.8 °F), temperature source Tympanic, resp. rate 18, height 1.6 m (5' 3\"), weight 78.5 kg (173 lb), SpO2 100%.         Vitals from the Past 24 Hours  Heart Rate:  [65]   Temp:  [36 °C (96.8 °F)-36.2 °C (97.2 °F)]   Resp:  [18]   BP: (115-144)/(73-81)   Height:  [160 cm (5' 3\")]   Weight:  [78.5 kg (173 lb)-78.7 kg (173 lb 9.6 oz)]   SpO2:  [95 %-100 %]          Relevant Results    Labs    CBC:   Recent Labs     11/06/24  0700 09/20/24  1831 09/13/24  0426 09/04/24  1535 08/05/24  1555 12/18/23  0820   WBC 12.6* 14.9* 25.6* 13.3* 15.9* 11.5*   HGB 13.6 13.6 12.4 14.0 13.5 14.0   HCT 41.7 44.2 37.9 43.1 43.1 45.5    402 310 359 123* 318   MCV 93 98 92 90 94 96     BMP/CMP:   Recent Labs     11/06/24  0700 09/20/24  1831 09/13/24  0426 09/04/24  1535 08/05/24  1555 03/18/24  1114 12/18/23  0820 12/18/23  0820 06/20/23  0942 11/08/22  0844 11/07/22  1311    142 137 141 140 143   < > 142 140   < > 138   K 3.4* 3.6 3.1* 3.5 3.9 3.7   < > 3.8 4.1   < > 3.8    99 100 102 100 103   < > 103 102   < > 101   BUN 22 9 12 14 18 8   < > 10 14   < > 7   CREATININE 1.51* 1.07* 1.34* 1.34* 1.33* 1.11*   < > 1.07* 1.22*   < > 1.23*   CO2 20* " "27 24 24 24 30   < > 26 27   < > 27   CALCIUM 9.6 9.5 9.2 8.5* 10.2 9.7   < > 9.9 9.9   < > 8.9   PROT  --  7.1  --  6.7 7.6  --   --  7.1 7.3  --  7.8   BILITOT  --  0.3  --  0.4 0.6  --   --  0.3 0.4  --  0.3   ALKPHOS  --  90  --  94 82  --   --  111 91  --  99   ALT  --  13  --  16 21  --   --  13 16  --  22   AST  --  15  --  16 16  --   --  13 19  --  26   GLUCOSE 253* 99 155* 110* 148* 142*   < > 145* 130*   < > 96    < > = values in this interval not displayed.      Lipid Panel:   Recent Labs     08/05/24  1555 12/18/23  0820 06/20/23  0942 05/25/22  1546 02/01/22  1206 10/06/21  1548 06/24/21  0547 04/08/21  1453 01/21/21  1005 04/16/20  0907 10/31/19  1400 02/01/19  1300   CHOL 234* 254* 197 188 186 209* 212* 205* 210* 204* 176 199   HDL 34.6 62.6 48.5 64.8 57.7 69.6 57.0 61.8 70.6 60.9 57.3 63.8   CHHDL 6.8 4.1 4.1 2.9 3.2 3.0 3.7 3.3 3.0 3.3 3.1 3.1   VLDL 50* 29 29 29 26 28 30 30 22 26 33 36   TRIG 249* 143 147 143 131 139 150* 152* 110 128 164* 180*   NHDL 199* 191*  --   --   --   --   --   --   --   --   --   --      Cardiac       No lab exists for component: \"CK\", \"CKMBP\"   Hemoglobin A1C:   Recent Labs     08/05/24  1555 12/18/23  0820 06/20/23  0942 10/25/22  0831 05/25/22  1546 02/01/22  1206 10/06/21  1548 07/19/21  1700 04/08/21  1453 04/16/20  0907 01/13/20  0940 10/31/19  1400   HGBA1C 6.2* 5.8* 6.3* 5.8* 5.8* 5.8* 6.0* 6.0 5.9 6.1 5.4 6.0     TSH/ Free T4:   Recent Labs     08/05/24  1555 06/20/23  0942 05/25/22  1546 04/08/21  1453 04/16/20  0907 10/31/19  1400 02/01/19  1300   TSH 2.64 1.08 1.41 2.35 1.39 0.99 2.51   FREET4  --   --   --   --  1.31 1.01 1.07     Iron:   Recent Labs     03/18/24  1114 11/07/22  1346 06/23/21  1429 01/21/21  1005 02/22/19  0849 06/25/18  1015 10/12/17  0920   BNP 31 82 53 46 11 13 26     Coag:     ABO: No results found for: \"ABO\"    Past Cardiology Tests (Last 3 Years):    EKG:  Recent Labs     08/13/19  1609 08/10/19  1852 07/25/19  0925   ATRRATE 77 113 61 "   VENTRATE 77 113 61   PRINT 176 168 184   QRSDUR 82 76 84   QTCFRED 458 402 494   QTCCALCB 477 447 495     Encounter Date: 08/13/24   ECG 12 lead (Clinic Performed)    Narrative    Sinus rhythm nonspecific ST and T changes     Echo:  Echocardiogram:   Echocardiogram   Study Date:       4/14/2023           PHYSICIAN INTERPRETATION:  Left Ventricle: The left ventricular systolic function is normal, with an estimated ejection fraction of 55-60%. Estimated left ventricular mass is normal. There are no regional wall motion abnormalities. The left ventricular cavity size is normal. The left ventricular septal wall thickness is mildly increased. There is normal left ventricular posterior wall thickness. Spectral Doppler shows an impaired relaxation pattern of left ventricular diastolic filling.  Left Atrium: The left atrium is normal in size.  Right Ventricle: The right ventricle is normal in size. There is normal right ventricular global systolic function.  Right Atrium: The right atrium is normal in size.  Aortic Valve: The aortic valve appears structurally normal. The aortic valve appears tricuspid. There is mild to moderate aortic valve cusp calcification. There is moderate aortic valve regurgitation. The peak instantaneous gradient of the aortic valve is 8.4 mmHg.  Mitral Valve: The mitral valve is normal in structure. There is no evidence of mitral valve regurgitation.  Tricuspid Valve: The tricuspid valve is structurally normal. No evidence of tricuspid regurgitation.  Pulmonic Valve: The pulmonic valve is structurally normal. There is trace to mild pulmonic valve regurgitation.  Pericardium: There is no pericardial effusion noted.  Aorta: The aortic root is normal.  Pulmonary Artery: The estimated pulmonary artery pressure is normal.  Systemic Veins: The inferior vena cava size appears small. There is IVC inspiratory collapse greater than 50%.  In comparison to the previous echocardiogram(s): Compared with study  "from 1/8/2021, there is increased aortic regurgitation.      CONCLUSIONS:  1. Left ventricular systolic function is normal with a 55-60% estimated ejection fraction.  2. Spectral Doppler shows an impaired relaxation pattern of left ventricular diastolic filling.  3. Moderate aortic valve regurgitation. Consider Cardiac MRI for further evaluation.      Ejection Fractions:  No results found for: \"EF\"  Cath:  Coronary Angiography: No results found for this or any previous visit from the past 1800 days.    Right Heart Cath: No results found for this or any previous visit from the past 1800 days.    Stress Test:  Nuclear:No results found for this or any previous visit from the past 1800 days.    Metabolic Stress: No results found for this or any previous visit from the past 1800 days.    Cardiac Imaging:  Cardiac Scoring: No results found for this or any previous visit from the past 1800 days.    Cardiac MRI:   MR cardiac morphology and function w and wo IV contrast 03/07/2024    FINDINGS:  CARDIAC CHAMBERS  Normal atrioventricular and ventriculoarterial concordance    LEFT ATRIUM  Normal size    RIGHT ATRIUM  Normal size    INTERATRIAL SEPTUM  Intact.    LEFT VENTRICLE  Quantitative LVEF 69 %. LV systolic function is normal. LV cavity  size is normal. There is no LV mass/thrombus. The basilar LV septum  is mildly thickened.    Native T1-1 1099  ECV 28%  T2 star myocardium 39 milliseconds  T2 star liver 14 milliseconds    *Thea AM et al. Normalized left ventricular systolic and diastolic  function by steady state free precession cardiovascular magnetic  resonance. J Cardiovasc Magn Reson 2006; 8:417-26.    Delayed-enhancement imaging reveals uniformly \"nulled\" myocardium,  signifying that there has been no prior ischemic myocardial damage.  There is also no definite evidence of interstitial fibrosis to  suggest an infiltrative process.    RIGHT VENTRICLE  Quantitative RVEF 60 %. RV systolic function is normal. RV " cavity  size is upper limits of normal. RV wall thickness is normal. There is  no RV mass/thrombus. There is no RV fibro-fatty infiltration.    INTERVENTRICULAR SEPTUM  Intact.    AORTIC VALVE  Aortic regurgitant volume 28 ml. Aortic regurgitant fraction 38 %.  There is moderate aortic regurgitation. Aortic valve is trileaflet.    MITRAL VALVE  Normal mitral valve leaflets. Trivial regurgitation appreciated.    TRICUSPID VALVE  There is qualitative no tricuspid regurgitation.    THORACIC AORTA  The thoracic aorta appears normal in course, caliber, and contour.  There is no evidence for acute aortic pathology. The arch vessel  branching pattern is  normal.   All the arch branch vessels appear  widely patent in their proximal portions.    PULMONARY ARTERIES  The central pulmonary arteries appear  normal (MPA-2.5 cm, RPA-1.9  cm, LPA-1.9 cm).    SYSTEMIC AND PULMONARY VEINS  Normal systemic venous and pulmonary venous return.  The SVC and IVC are of normal caliber.  Normal pulmonary venous anatomy.    CHEST  The chest wall is normal.  No significant lymphadenopathy or mass is seen in limited images of  the mediastinum. Limited imaging through the lungs. There is a  density appreciated in the right upper lung field measuring 7 mm x 5  mm in diameter poorly characterized with current study.    UPPER ABDOMEN  Limited imaging through the upper abdomen reveals no abnormalities of  the visualized organs.    Impression  1. Moderate aortic valve regurgitation. Regurgitant fraction 38%.  Regurgitant volume 20%.  2. Normal biventricular size and function. LVEF 68%. RVEF 60%.  3. There is no evidence of LGE/scar to suggest prior infarct or  infiltrative process.  4. There is a density appreciated in the right upper lung field  measuring 7 mm x 5 mm in diameter poorly characterized with current  study. Consider dedicated chest CT imaging for further  characterization.      [facility] US PVR WITHOUT EXERCISE  Study Date:        7/26/2024             CONCLUSIONS:  Right Lower PVR: Evidence of moderate arterial occlusive disease in the right lower extremity at rest. Decreased digital perfusion noted. Monophasic flow is noted in the right posterior tibial artery and right dorsalis pedis artery. Multiphasic flow is noted in the right common femoral artery and right popliteal artery.  Left Lower PVR: Evidence of mild arterial occlusive disease in the left lower extremity at rest. Decreased digital perfusion noted. Monophasic flow is noted in the left posterior tibial artery. Multiphasic flow is noted in the left common femoral artery, left popliteal artery and left dorsalis pedis artery.     Imaging & Doppler Findings:     RIGHT Lower PVR                Pressures Ratios  Right High Thigh               176 mmHg  1.22  Right Low Thigh                102 mmHg  0.71  Right Calf                     95 mmHg   0.66  Right Posterior Tibial (Ankle) 101 mmHg  0.70  Right Dorsalis Pedis (Ankle)   95 mmHg   0.66  Right Digit (Great Toe)        31 mmHg   0.22           LEFT Lower PVR                Pressures Ratios  Left High Thigh               143 mmHg  0.99  Left Low Thigh                104 mmHg  0.72  Left Calf                     101 mmHg  0.70  Left Posterior Tibial (Ankle) 109 mmHg  0.76  Left Dorsalis Pedis (Ankle)   116 mmHg  0.81  Left Digit (Great Toe)        59 mmHg   0.41                           Right  Brachial Pressure 144 mmHg        Assessment/Plan  Assessment/Plan   Principal Problem:    PAD (peripheral artery disease) (CMS-HCC)  CLI right lower extremity  -right peripheral angiogram +/- intervention with Dr. Vo on 11/6/24  -asa prior to procedure  -benadryl 50mg IV once prior to procedure (patient states she did not take)       NP discussed with Dr. Vo regarding plan of care/ discharge plan      I spent 30 minutes in the professional and overall care of this patient.      Nixon Gamble, APRN-CNP, DNP

## 2024-11-06 NOTE — NURSING NOTE
Patient assisted to sitting position, feet dangled, then up to ambulate.  Ambulated in crain approx 100 ft and back to room.  No complaints of dizziness or lightheadedness.  Left groin site clean, dry, intact and soft.

## 2024-11-06 NOTE — POST-PROCEDURE NOTE
Physician Transition of Care Summary  Invasive Cardiovascular Lab    Procedure Date: 11/6/2024  Attending:    Lavell Vo - Primary  Resident/Fellow/Other Assistant: Surgeons and Role:  * No surgeons found with a matching role *    Indications:   Pre-op Diagnosis      * PAD (peripheral artery disease) (CMS-HCC) [I73.9]    Post-procedure diagnosis:   Post-op Diagnosis     * PAD (peripheral artery disease) (CMS-HCC) [I73.9]    Procedure(s):   Lower Extremity Angiogram  95892 - CHG ANGIOGRAPHY EXTREMITY UNILATERAL RS&I    Procedure Findings:   -Indication: Severe right lower extremity rest pain and claudication.  -Status post successful IVUS-guided revascularization of proximal right external iliac artery using 9.0 x 40 mm self-expanding stent [postdilated using 8.0 mm balloon], and PTA-DCB angioplasty of mid-distal right SFA-distal popliteal .  -IVUS of the right proximal external iliac artery with a reference area of 50 mm² and MLA of 13 mm² yielding 75% stenosis    Access of the Procedure:   6 Romanian, right CFA, closed with Vascade and manual pressure.  Sheathless retrograde right DP access, closed with manual pressure.     Complications:   None.    Stents/Implants:   Implants       Stent    Stent, Vasc Absolute Pro 9 X 40 X 135 - [id number] - Implanted        Inventory item: STENT, VASC ABSOLUTE PRO 9 X 40 X 135 Model/Cat number: [id number]    : [employer] Lot number: [id number]    Device identifier: [id number]        As of 11/6/2024       Status: Implanted                              Anticoagulation/Antiplatelet Plan:   Heparin boluses to maintain therapeutic ACT.    Estimated Blood Loss:   5 mL    Anesthesia: Moderate Sedation Anesthesia Staff: No anesthesia staff entered.    Any Specimen(s) Removed:   Order Name Source Comment Collection Info Order Time   CBC Blood, Venous  Collected By: Murali Crowder RN 11/6/2024  6:54 AM     Release result to [facility]   Immediate        BASIC METABOLIC  PANEL Blood, Venous  Collected By: Murali Crowder RN 11/6/2024  6:54 AM     Release result to [facility]   Immediate            Disposition:   -5-hour bedrest.  -Continue home aspirin, start Plavix load and 75 mg afterwards daily for 6 months.  -Will evaluate patient in the clinic in 4 weeks with repeat [id number] with exercise, and reevaluate left leg symptoms to decide if revascularization is necessary.  -Referring physician, Dr. Smith, was updated.    Electronically signed by: Keyla Vo MD, 11/6/2024 10:52 AM

## 2024-11-06 NOTE — Clinical Note
Patient Clipped and Prepped: right pedal. Prepped with ChloraPrep, a minimum of 3 minute dry time, longer if needed, no pooling noted, patient draped in sterile fashion.

## 2024-11-07 DIAGNOSIS — R05.9 COUGH, UNSPECIFIED TYPE: ICD-10-CM

## 2024-11-07 RX ORDER — PROMETHAZINE HYDROCHLORIDE 6.25 MG/5ML
12.5 SYRUP ORAL EVERY 6 HOURS PRN
Qty: 120 ML | Refills: 0 | Status: SHIPPED | OUTPATIENT
Start: 2024-11-07 | End: 2024-11-14

## 2024-11-08 DIAGNOSIS — I70.221 CRITICAL LIMB ISCHEMIA OF RIGHT LOWER EXTREMITY (MULTI): Primary | ICD-10-CM

## 2024-11-18 ENCOUNTER — APPOINTMENT (OUTPATIENT)
Dept: PHARMACY | Facility: HOSPITAL | Age: 74
End: 2024-11-18
Payer: COMMERCIAL

## 2024-11-18 DIAGNOSIS — E08.21 DIABETES MELLITUS DUE TO UNDERLYING CONDITION, CONTROLLED, WITH DIABETIC NEPHROPATHY, WITHOUT LONG-TERM CURRENT USE OF INSULIN: ICD-10-CM

## 2024-11-18 DIAGNOSIS — I50.32 CHRONIC DIASTOLIC (CONGESTIVE) HEART FAILURE: Chronic | ICD-10-CM

## 2024-11-18 DIAGNOSIS — J44.9 CHRONIC OBSTRUCTIVE PULMONARY DISEASE, UNSPECIFIED COPD TYPE (MULTI): ICD-10-CM

## 2024-11-18 DIAGNOSIS — Z87.891 FORMER SMOKER: ICD-10-CM

## 2024-11-18 DIAGNOSIS — R91.8 LUNG NODULES: ICD-10-CM

## 2024-11-18 RX ORDER — IPRATROPIUM BROMIDE AND ALBUTEROL SULFATE 2.5; .5 MG/3ML; MG/3ML
3 SOLUTION RESPIRATORY (INHALATION)
COMMUNITY

## 2024-11-18 NOTE — ASSESSMENT & PLAN NOTE
Patient has Stage B Class 1 HFpEF with most recent EF 67%. Patient is not on complete GDMT.   Patient is doing well overall from a heart failure standpoint. She is currently tolerating 3/4 GDMT w/o complaints of side effects. She is also taking Lasix and potassium supplementation. She denies dyspnea, edema, dizziness, or any other symptoms of heart failure. She is able to stay active w/o complaint of SOB. She follows closely with her cardiologist and will see him in the Spring of next year at which time she will get another echocardiogram. Will continue all medications at this time.     CONTINUE:  Losartan 100 mg daily  Carvedilol 25 mg twice daily  Jardiance 25 mg daily  Furosemide 40 mg daily  Klor-Con 10 mEq ER daily    Monitoring and Education:  Weigh yourself without clothing daily after using the bathroom first thing in the morning before breakfast   Contact your physician/seek help immediately if you notice the following with symptoms of shortness of breath or swelling in your extremities:   Weight gain of 3+ lbs overnight   Weight gain of 5+ lbs in a week   Physical limitations to your normal physical activity level   Limit fluid intake as instructed by your doctor and follow a heart friendly diet low in salt, fat, and focused in lean meats   Aim to exercise for 30 minutes anywhere between 3 to 5 times a week or more, depending on your physical limitations   Keep a log of your daily BP, HR and weight to share with providers   If you are a smoker or drink alcohol, consider cessation to improve your heart health

## 2024-11-18 NOTE — PROGRESS NOTES
Pharmacy Post-Discharge Visit    Serjio Nunes is a 74 y.o. female who was referred to Clinical Pharmacy Team to complete a post-discharge medication optimization and monitoring visit.  The patient was referred for their Diabetes, COPD, and Congestive Heart Failure.    Pt is here for Follow Up appointment.     Admission Date: 9/12/24  Discharge Date: 9/13/24    Referring Provider/PCP: Rayomnd Carnes MD  Last Visit: 9/23/24  Next visit: 12/19/24    Subjective   HPI  DIABETES MELLITUS TYPE 2:  Ms. Nunes is a pleasant 75 y/o female returning for follow up via telehealth with clinical pharmacy. She is doing very well from a diabetes standpoint. She reports her blood sugars are under control again, ranging from 115-140. She stays adherent with her medications and utilizes the Jaycob sensors to monitor blood sugar. She has started eating breakfast again and typically eats an average of two meals per day. She remains physically active.     Does patient follow with Endocrinology: No  Last optometry exam: last year  Most recent visit in Podiatry: cannot remember-- patient denies sores or cuts on feet today      Current diabetic medications include:  Jardiance 25 mg daily  Tradjenta 5 mg daily    Clarifications to above regimen: none  Adverse Effects: none    Secondary Prevention:  Statin? Yes  ACE-I/ARB? Yes  Aspirin? Yes    CONGESTIVE HEART FAILURE ASSESSMENT  Does patient follow with Cardiology: Yes    Date: 10/22/24    Staging  Ejection Fraction: 67% (1/12/24)  NYHA Class: Class I - No limitations to physical activity  ACC/AHA Stage: B - Structural disease without SxS    Symptom Assessment  Weight changes/edema?: No  Dyspnea?: None  Dizziness/syncope/palpitations?: No    Medication Therapy  Current Regimen (GDMT):  ARNI/ACEi/ARB: Yes - losartan 100 mg daily  Beta Blocker: Yes - carvedilol 25 mg twice daily  MRA: No  SGLT2i: Yes - Jardiance 25 mg daily    Other therapy:  Furosemide 40 mg daily  Klor-Con 10 MEq  daily    Clarifications to above regimen: none   Adverse Effects: none     Secondary Prevention  The 10-year ASCVD risk score (Isabel OAKES, et al., 2019) is: 58%    Values used to calculate the score:      Age: 74 years      Sex: Female      Is Non- : Yes      Diabetic: Yes      Tobacco smoker: Yes      Systolic Blood Pressure: 143 mmHg      Is BP treated: Yes      HDL Cholesterol: 34.6 mg/dL      Total Cholesterol: 234 mg/dL  Aspirin 81mg? yes  Statin?: Yes - atorvastatin   HTN?: Yes - well controlled      PULMONARY ASSESSMENT  Patient has been diagnosed with: COPD  does see a pulmonologist  Last visit: 11/5/24    Current Regimen:  Duonebs prn  Albuterol HFA prn    Advair 250/50 1 puff twice daily    Clarifications to above regimen: none   Adverse Effects: none   Appropriate technique? Yes    How often do you use your rescue inhaler? Has not used a rescure inhaler in the last three months    Symptom Assessment:  Current symptoms:  none  Symptoms remain unchanged  Triggers: allergens in the air occasionally  Alleviating factors: albuterol if truly needed    Exacerbation Hx:  When was your last hospitalization for an exacerbation? November 2023  When was the last time you were treated with antibiotics and/or steroids? July 2024     Drug Interactions  No relevant drug interactions were noted.    Medication System Management  Patient's preferred pharmacy: [facility]  Adherence/Organization: taking regularly, has great support system with her daughter helping out  Affordability/Accessibility: able to afford      Objective   Allergies   Allergen Reactions    Iodinated Contrast Media Anaphylaxis    Strawberry Angioedema    Iodine Swelling    Latex Rash     Social History     Social History Narrative    Not on file      Medication Review  Current Outpatient Medications   Medication Instructions    albuterol sulfate (Proair Digihaler) 90 mcg/actuation aero powdr breath act w/sensor inhaler 2  puffs, Every 6 hours PRN    amLODIPine (Norvasc) 10 mg tablet TAKE 1 TABLET BY MOUTH EVERY DAY    aspirin 81 mg EC tablet 1 tablet, Daily    atorvastatin (LIPITOR) 40 mg, oral, Daily    busPIRone (BUSPAR) 5 mg, oral, 2 times daily PRN    carvedilol (Coreg) 25 mg tablet TAKE 1 TABLET BY MOUTH TWICE A DAY    cholecalciferol (VITAMIN D-3) 2,000 Units, Daily RT    clopidogrel (PLAVIX) 75 mg, oral, Daily    colchicine 0.6 mg, oral, Daily    empagliflozin (JARDIANCE) 25 mg, oral, Daily    fluocinonide 0.05 % cream 2 times daily    fluticasone (Flonase) 50 mcg/actuation nasal spray 1 spray, Daily    fluticasone propion-salmeteroL (Advair Diskus) 250-50 mcg/dose diskus inhaler 1 puff, 2 times daily RT    FreeStyle Jaycob 14 Day Sensor kit APPLY SENSOR TO BACK UPPER ARM REMOVE AND REPLACE EVERY 14 DAYS USE WITH DEVICE    furosemide (LASIX) 20 mg, oral, Daily    ipratropium-albuteroL (Duo-Neb) 0.5-2.5 mg/3 mL nebulizer solution 3 mL, nebulization, Every 6 hours RT    Klor-Con M10 10 mEq ER tablet 10 mEq, oral, Daily    losartan (COZAAR) 50 mg, oral, Daily    miconazole (Micotin) 2 % cream miconazole nitrate 2 % topical cream   APPLY SPARINGLY TO AFFECTED AREA TWICE A DAY    nitroglycerin (NITROSTAT) 0.4 mg, sublingual, Every 5 min PRN    pantoprazole (ProtoNix) 40 mg EC tablet Take 1 tablet (40 mg) by mouth once daily in the morning before meals. Do not crush, chew, or split.    tiZANidine (ZANAFLEX) 4 mg, oral, Every 6 hours PRN    Tradjenta 5 mg, oral, Daily, as directed      Vitals  BP Readings from Last 2 Encounters:   11/06/24 143/66   11/05/24 115/73     BMI Readings from Last 1 Encounters:   11/06/24 30.65 kg/m²      Labs  A1C  Lab Results   Component Value Date    HGBA1C 6.2 (H) 08/05/2024    HGBA1C 5.8 (H) 12/18/2023    HGBA1C 6.3 (A) 06/20/2023     BMP  Lab Results   Component Value Date    CALCIUM 9.6 11/06/2024     11/06/2024    K 3.4 (L) 11/06/2024    CO2 20 (L) 11/06/2024     11/06/2024    BUN 22  "11/06/2024    CREATININE 1.51 (H) 11/06/2024    EGFR 37 (L) 11/06/2024     LFTs  Lab Results   Component Value Date    ALT 13 09/20/2024    AST 15 09/20/2024    ALKPHOS 90 09/20/2024    BILITOT 0.3 09/20/2024     FLP  Lab Results   Component Value Date    TRIG 249 (H) 08/05/2024    CHOL 234 (H) 08/05/2024    LDLF 119 (H) 06/20/2023    LDLCALC 150 08/05/2024    HDL 34.6 08/05/2024     Urine Microalbumin  No results found for: \"MICROALBCREA\"  Wt Readings from Last 3 Encounters:   11/06/24 78.5 kg (173 lb)   11/05/24 78.7 kg (173 lb 9.6 oz)   10/22/24 80.6 kg (177 lb 9.6 oz)      There is no height or weight on file to calculate BMI.       Assessment/Plan   Problem List Items Addressed This Visit       Diabetes mellitus due to underlying condition, controlled, with diabetic nephropathy, without long-term current use of insulin     Patient's goal A1c is < 7%.  Is pt at goal? Yes, 6.2%  Patient is doing very well from a diabetes standpoint. At her previous appointment her blood sugars were still slightly elevated but were showing signs of improvement. She reports today she has returned to baseline, with averages between 115-140. She denies any symptoms of hypo or hyperglycemia. She is tolerating both Tradjenta and Jardiance w/o complaints of side effects. She is well versed in what a healthy diet looks like and she is remaining physically active. Will continue all medications at this time and follow up with clinical pharmacy on an as needed basis.     Plan:  Continue Jardiance 25 mg daily  Continue Tradjenta 5 mg daily           Chronic diastolic (congestive) heart failure (Chronic)     Patient has Stage B Class 1 HFpEF with most recent EF 67%. Patient is not on complete GDMT.   Patient is doing well overall from a heart failure standpoint. She is currently tolerating 3/4 GDMT w/o complaints of side effects. She is also taking Lasix and potassium supplementation. She denies dyspnea, edema, dizziness, or any other " symptoms of heart failure. She is able to stay active w/o complaint of SOB. She follows closely with her cardiologist and will see him in the Spring of next year at which time she will get another echocardiogram. Will continue all medications at this time.     CONTINUE:  Losartan 100 mg daily  Carvedilol 25 mg twice daily  Jardiance 25 mg daily  Furosemide 40 mg daily  Klor-Con 10 mEq ER daily    Monitoring and Education:  Weigh yourself without clothing daily after using the bathroom first thing in the morning before breakfast   Contact your physician/seek help immediately if you notice the following with symptoms of shortness of breath or swelling in your extremities:   Weight gain of 3+ lbs overnight   Weight gain of 5+ lbs in a week   Physical limitations to your normal physical activity level   Limit fluid intake as instructed by your doctor and follow a heart friendly diet low in salt, fat, and focused in lean meats   Aim to exercise for 30 minutes anywhere between 3 to 5 times a week or more, depending on your physical limitations   Keep a log of your daily BP, HR and weight to share with providers   If you are a smoker or drink alcohol, consider cessation to improve your heart health            Chronic obstructive lung disease (Multi)     Patient is doing well in managing her COPD and follows up regularly with her pulmonologist. She is routinely asymptomatic and denies any dyspnea, productive cough, or wheezing. She has not needed a rescue inhaler for the past few months. Recommend she get her annual influenza vaccination. Will continue her current regimen of inhalers at this time.    Plan:  Continue Duonebs as needed  Continue albuterol 90 mcg as needed   Continue Advair 250-50 mcg twice daily        Clinical Pharmacist follow-up: on an as needed basis, any further follow up will be completed via telehealth visit.    Continue all meds under the continuation of care with the referring provider and clinical  pharmacy team.    Thank you,  Nader Carballo, PharmD    Meds PGY1 Resident  [phone]    Verbal consent to manage patient's drug therapy was obtained from the patient. They were informed they may decline to participate or withdraw from participation in pharmacy services at any time.

## 2024-11-18 NOTE — ASSESSMENT & PLAN NOTE
Patient is doing well in managing her COPD and follows up regularly with her pulmonologist. She is routinely asymptomatic and denies any dyspnea, productive cough, or wheezing. She has not needed a rescue inhaler for the past few months. Recommend she get her annual influenza vaccination. Will continue her current regimen of inhalers at this time.    Plan:  Continue Duonebs as needed  Continue albuterol 90 mcg as needed   Continue Advair 250-50 mcg twice daily

## 2024-11-18 NOTE — ASSESSMENT & PLAN NOTE
Patient's goal A1c is < 7%.  Is pt at goal? Yes, 6.2%  Patient is doing very well from a diabetes standpoint. At her previous appointment her blood sugars were still slightly elevated but were showing signs of improvement. She reports today she has returned to baseline, with averages between 115-140. She denies any symptoms of hypo or hyperglycemia. She is tolerating both Tradjenta and Jardiance w/o complaints of side effects. She is well versed in what a healthy diet looks like and she is remaining physically active. Will continue all medications at this time and follow up with clinical pharmacy on an as needed basis.     Plan:  Continue Jardiance 25 mg daily  Continue Tradjenta 5 mg daily

## 2024-12-05 ENCOUNTER — HOSPITAL ENCOUNTER (OUTPATIENT)
Dept: VASCULAR MEDICINE | Facility: HOSPITAL | Age: 74
Discharge: HOME | End: 2024-12-05
Payer: COMMERCIAL

## 2024-12-05 DIAGNOSIS — I70.221 CRITICAL LIMB ISCHEMIA OF RIGHT LOWER EXTREMITY (MULTI): ICD-10-CM

## 2024-12-05 DIAGNOSIS — I73.9 PERIPHERAL VASCULAR DISEASE, UNSPECIFIED (CMS-HCC): ICD-10-CM

## 2024-12-05 PROCEDURE — 93924 LWR XTR VASC STDY BILAT: CPT | Performed by: SURGERY

## 2024-12-05 PROCEDURE — 93924 LWR XTR VASC STDY BILAT: CPT

## 2024-12-06 ENCOUNTER — OFFICE VISIT (OUTPATIENT)
Dept: CARDIOLOGY | Facility: CLINIC | Age: 74
End: 2024-12-06
Payer: COMMERCIAL

## 2024-12-06 VITALS
OXYGEN SATURATION: 97 % | HEART RATE: 67 BPM | WEIGHT: 174.9 LBS | BODY MASS INDEX: 30.99 KG/M2 | SYSTOLIC BLOOD PRESSURE: 112 MMHG | DIASTOLIC BLOOD PRESSURE: 67 MMHG | HEIGHT: 63 IN

## 2024-12-06 DIAGNOSIS — I70.211 ATHEROSCLEROSIS OF NATIVE ARTERY OF RIGHT LOWER EXTREMITY WITH INTERMITTENT CLAUDICATION (CMS-HCC): Primary | ICD-10-CM

## 2024-12-06 DIAGNOSIS — I73.9 PAD (PERIPHERAL ARTERY DISEASE) (CMS-HCC): Primary | ICD-10-CM

## 2024-12-06 PROCEDURE — 3008F BODY MASS INDEX DOCD: CPT | Performed by: HOSPITALIST

## 2024-12-06 PROCEDURE — 1126F AMNT PAIN NOTED NONE PRSNT: CPT | Performed by: HOSPITALIST

## 2024-12-06 PROCEDURE — 3050F LDL-C >= 130 MG/DL: CPT | Performed by: HOSPITALIST

## 2024-12-06 PROCEDURE — 4010F ACE/ARB THERAPY RXD/TAKEN: CPT | Performed by: HOSPITALIST

## 2024-12-06 PROCEDURE — 3078F DIAST BP <80 MM HG: CPT | Performed by: HOSPITALIST

## 2024-12-06 PROCEDURE — 99213 OFFICE O/P EST LOW 20 MIN: CPT | Performed by: HOSPITALIST

## 2024-12-06 PROCEDURE — 3074F SYST BP LT 130 MM HG: CPT | Performed by: HOSPITALIST

## 2024-12-06 PROCEDURE — 1159F MED LIST DOCD IN RCRD: CPT | Performed by: HOSPITALIST

## 2024-12-06 PROCEDURE — 3044F HG A1C LEVEL LT 7.0%: CPT | Performed by: HOSPITALIST

## 2024-12-06 ASSESSMENT — ENCOUNTER SYMPTOMS
LOSS OF SENSATION IN FEET: 0
OCCASIONAL FEELINGS OF UNSTEADINESS: 0
DEPRESSION: 0

## 2024-12-06 ASSESSMENT — PATIENT HEALTH QUESTIONNAIRE - PHQ9
1. LITTLE INTEREST OR PLEASURE IN DOING THINGS: NOT AT ALL
SUM OF ALL RESPONSES TO PHQ9 QUESTIONS 1 AND 2: 0
2. FEELING DOWN, DEPRESSED OR HOPELESS: NOT AT ALL

## 2024-12-06 ASSESSMENT — PAIN SCALES - GENERAL: PAINLEVEL_OUTOF10: 0-NO PAIN

## 2024-12-06 ASSESSMENT — COLUMBIA-SUICIDE SEVERITY RATING SCALE - C-SSRS
1. IN THE PAST MONTH, HAVE YOU WISHED YOU WERE DEAD OR WISHED YOU COULD GO TO SLEEP AND NOT WAKE UP?: NO
6. HAVE YOU EVER DONE ANYTHING, STARTED TO DO ANYTHING, OR PREPARED TO DO ANYTHING TO END YOUR LIFE?: NO
2. HAVE YOU ACTUALLY HAD ANY THOUGHTS OF KILLING YOURSELF?: NO

## 2024-12-06 NOTE — PATIENT INSTRUCTIONS
Thank you so much for visiting us today.    Please continue aspirin 81 mg once daily for life and Plavix [clopidogrel] 75 mg once daily for at least next 6 months from your procedure.    We are going to refer to exercise rehab program.    Please feel free to follow-up with Dr. Smith or us in 1 year, call us at [phone] if you have any questions.

## 2024-12-06 NOTE — PROGRESS NOTES
Subjective   Serjio Nunes is a 74 y.o. female with PMH of HTN, HLD, obesity, BILL, chronic diastolic HF, anaphylaxis to contrast, and other comorbidities, who was initially referred by Dr. Smith for evaluation of PAD.  Patient status post intervention of her right leg on 11/6/2024.  Patient no more has pain right leg rest pain, previously could not sleep and used to wake up to dangle her leg of the bed.  Additionally, she can walk 2 block now without having to stop, previously she was having pain in the calf walking inside the house.  She still has numbness of bilateral toes at rest, likely related to neuropathy.  Patient quit smoking in October 2024.  She is compliant with her medications including aspirin, Plavix, and atorvastatin. Patient follows up with Dr. Cantu for cardiac care.      Cr 1.07, Hgb wnl.    PVR on 12/5/2024 with right KWADWO of 0.87/0.85, right TBI of 0.44, postexercise right KWADWO of 0.87, left KWADWO of 0.55/0.74, left TBI of 0.41 and postexercise left KWADWO of 0.71    EVT 11/6/2024:  1. Indication: Severe right lower extremity rest pain and claudication.   2. Status post successful IVUS_guided revascularization of proximal right external iliac artery using 9.0 x 40 mm self-expanding stent [postdilated using 8.0 mm balloon], and PTA_DCB angioplasty of mid_distal right SFA_distal popliteal .   3. IVUS of the right proximal external iliac artery with a reference area of 50 mmï2 and MLA of 13 mm2 yielding 75% stenosis.   4. Continue home aspirin, start Plavix load and 75 mg afterwards daily for 6 months.   5. Referring physician, Dr. Smith, was updated.     PVR testing from 7/26/2024:  Right KWADWO 1.22, 0.71, 0.66, 0.7/0.66, right TBI 0.22, left KWADWO 0.99, 0.72, 0.70, 0.76/0.81 and left TBI of 0.41. There is 34 mmHg difference between the right and the left thighs indicative of left iliac disease, there is also evidence of right SFA disease with 74 mmHg across from right high to low thigh.         Review of Systems  ROS is negative other than in HPI.      Objective   Physical Exam  General: NAD  HEENT: IEOM, PERRL   Neck: No JVD or carotid bruit  Lungs: CTAB  Heart: RRR, normal S1 and S2, no loud murmurs  Abdomen: Soft, nontender, positive bowel sounds  Extremities: No edema  Neurologic: No FND  Psychiatric: Normal mood and affect    Assessment/Plan   1-PAD/CLI:  -Severe claudication especially of the right leg and right leg rest pain.  -PVR testing from 7/26/2024: Right KWADWO 1.22, 0.71, 0.66, 0.7/0.66, right TBI 0.22, left KWADWO 0.99, 0.72, 0.70, 0.76/0.81 and left TBI of 0.41. There is 34 mmHg difference between the right and the left thighs indicative of left iliac disease, there is also evidence of right SFA disease with 74 mmHg across from right high to low thigh.  Patient's hemodynamics were probably way worse preintervention as this test was from 4-month earlier to the procedure.  -Status post successful IVUS-guided revascularization of proximal right external iliac artery using 9.0 x 40 mm self-expanding stent [postdilated using 8.0 mm balloon], and PTA-DCB angioplasty of mid-distal right SFA-distal popliteal .  -PVR on 12/5/2024 with right KWADWO of 0.87/0.85, right TBI of 0.44, postexercise right KWADWO of 0.87, left KWADWO of 0.55/0.74, left TBI of 0.41 and postexercise left KWADWO of 0.71.  -Patient no longer has right leg rest pain, and her right leg claudication is mild currently, similar to her left leg.  -Will refer to exercise rehab program  -Continue ASA 81, Plavix 75 mg for 6 months, and Atorvastatin 40 mg once daily..      Follow-up with Dr. Smith.      Keyla Vo MD

## 2024-12-06 NOTE — LETTER
December 6, 2024     [place]  [address]  [facility] [place]  [facility] [address]    Patient: Serjio Nunes   YOB: 1950   Date of Visit: 12/6/2024       Dear Dr. Maritza Smith MD, MS:    Thank you for referring Serjio Nunes to me for evaluation. Below are my notes for this consultation.  If you have questions, please do not hesitate to call me. I look forward to following your patient along with you.       Sincerely,     Keyla Vo MD      CC: No Recipients  ______________________________________________________________________________________    Subjective  Serjio Nunes is a 74 y.o. female with PMH of HTN, HLD, obesity, BILL, chronic diastolic HF, anaphylaxis to contrast, and other comorbidities, who was initially referred by Dr. Smith for evaluation of PAD.  Patient status post intervention of her right leg on 11/6/2024.  Patient no more has pain right leg rest pain, previously could not sleep and used to wake up to dangle her leg of the bed.  Additionally, she can walk 2 block now without having to stop, previously she was having pain in the calf walking inside the house.  She still has numbness of bilateral toes at rest, likely related to neuropathy.  Patient quit smoking in October 2024.  She is compliant with her medications including aspirin, Plavix, and atorvastatin. Patient follows up with Dr. Cantu for cardiac care.      Cr 1.07, Hgb wnl.    PVR on 12/5/2024 with right KWADWO of 0.87/0.85, right TBI of 0.44, postexercise right KWADWO of 0.87, left KWADWO of 0.55/0.74, left TBI of 0.41 and postexercise left KWADWO of 0.71    EVT 11/6/2024:  1. Indication: Severe right lower extremity rest pain and claudication.   2. Status post successful IVUS_guided revascularization of proximal right external iliac artery using 9.0 x 40 mm self-expanding stent [postdilated using 8.0 mm balloon], and PTA_DCB angioplasty of mid_distal right SFA_distal popliteal  .   3. IVUS of the right proximal external iliac artery with a reference area of 50 mmï2 and MLA of 13 mm2 yielding 75% stenosis.   4. Continue home aspirin, start Plavix load and 75 mg afterwards daily for 6 months.   5. Referring physician, Dr. Smith, was updated.     PVR testing from 7/26/2024:  Right KWADWO 1.22, 0.71, 0.66, 0.7/0.66, right TBI 0.22, left KWADWO 0.99, 0.72, 0.70, 0.76/0.81 and left TBI of 0.41. There is 34 mmHg difference between the right and the left thighs indicative of left iliac disease, there is also evidence of right SFA disease with 74 mmHg across from right high to low thigh.        Review of Systems  ROS is negative other than in HPI.      Objective  Physical Exam  General: NAD  HEENT: IEOM, PERRL   Neck: No JVD or carotid bruit  Lungs: CTAB  Heart: RRR, normal S1 and S2, no loud murmurs  Abdomen: Soft, nontender, positive bowel sounds  Extremities: No edema  Neurologic: No FND  Psychiatric: Normal mood and affect    Assessment/Plan  1-PAD/CLI:  -Severe claudication especially of the right leg and right leg rest pain.  -PVR testing from 7/26/2024: Right KWADWO 1.22, 0.71, 0.66, 0.7/0.66, right TBI 0.22, left KWADWO 0.99, 0.72, 0.70, 0.76/0.81 and left TBI of 0.41. There is 34 mmHg difference between the right and the left thighs indicative of left iliac disease, there is also evidence of right SFA disease with 74 mmHg across from right high to low thigh.  Patient's hemodynamics were probably way worse preintervention as this test was from 4-month earlier to the procedure.  -Status post successful IVUS-guided revascularization of proximal right external iliac artery using 9.0 x 40 mm self-expanding stent [postdilated using 8.0 mm balloon], and PTA-DCB angioplasty of mid-distal right SFA-distal popliteal .  -PVR on 12/5/2024 with right KWADWO of 0.87/0.85, right TBI of 0.44, postexercise right KWADWO of 0.87, left KWADWO of 0.55/0.74, left TBI of 0.41 and postexercise left KWADWO of 0.71.  -Patient no  longer has right leg rest pain, and her right leg claudication is mild currently, similar to her left leg.  -Will refer to exercise rehab program  -Continue ASA 81, Plavix 75 mg for 6 months, and Atorvastatin 40 mg once daily..      Follow-up with Dr. Smith.      Keyla Vo MD

## 2024-12-09 ENCOUNTER — APPOINTMENT (OUTPATIENT)
Dept: VASCULAR MEDICINE | Facility: HOSPITAL | Age: 74
End: 2024-12-09
Payer: COMMERCIAL

## 2024-12-14 DIAGNOSIS — M50.20 CERVICAL DISC HERNIATION: ICD-10-CM

## 2024-12-16 RX ORDER — TIZANIDINE 4 MG/1
4 TABLET ORAL EVERY 6 HOURS PRN
Qty: 90 TABLET | Refills: 1 | Status: SHIPPED | OUTPATIENT
Start: 2024-12-16 | End: 2025-02-14

## 2024-12-17 ENCOUNTER — APPOINTMENT (OUTPATIENT)
Dept: ENDOCRINOLOGY | Facility: CLINIC | Age: 74
End: 2024-12-17
Payer: COMMERCIAL

## 2024-12-17 ENCOUNTER — LAB (OUTPATIENT)
Dept: LAB | Facility: LAB | Age: 74
End: 2024-12-17
Payer: COMMERCIAL

## 2024-12-17 VITALS
SYSTOLIC BLOOD PRESSURE: 138 MMHG | DIASTOLIC BLOOD PRESSURE: 84 MMHG | WEIGHT: 173 LBS | HEIGHT: 63 IN | BODY MASS INDEX: 30.65 KG/M2

## 2024-12-17 DIAGNOSIS — E04.2 MULTINODULAR GOITER: Primary | ICD-10-CM

## 2024-12-17 DIAGNOSIS — E08.21 DIABETES MELLITUS DUE TO UNDERLYING CONDITION, CONTROLLED, WITH DIABETIC NEPHROPATHY, WITHOUT LONG-TERM CURRENT USE OF INSULIN: ICD-10-CM

## 2024-12-17 DIAGNOSIS — E07.9 THYROID MASS: ICD-10-CM

## 2024-12-17 DIAGNOSIS — N17.9 ACUTE KIDNEY INJURY (CMS-HCC): ICD-10-CM

## 2024-12-17 DIAGNOSIS — I10 PRIMARY HYPERTENSION: ICD-10-CM

## 2024-12-17 LAB
ALBUMIN SERPL BCP-MCNC: 4.4 G/DL (ref 3.4–5)
ALP SERPL-CCNC: 135 U/L (ref 33–136)
ALT SERPL W P-5'-P-CCNC: 14 U/L (ref 7–45)
ANION GAP SERPL CALC-SCNC: 15 MMOL/L (ref 10–20)
AST SERPL W P-5'-P-CCNC: 12 U/L (ref 9–39)
BILIRUB SERPL-MCNC: 0.4 MG/DL (ref 0–1.2)
BUN SERPL-MCNC: 17 MG/DL (ref 6–23)
CALCIUM SERPL-MCNC: 10 MG/DL (ref 8.6–10.6)
CHLORIDE SERPL-SCNC: 102 MMOL/L (ref 98–107)
CO2 SERPL-SCNC: 28 MMOL/L (ref 21–32)
CREAT SERPL-MCNC: 1.39 MG/DL (ref 0.5–1.05)
EGFRCR SERPLBLD CKD-EPI 2021: 40 ML/MIN/1.73M*2
ERYTHROCYTE [DISTWIDTH] IN BLOOD BY AUTOMATED COUNT: 14.4 % (ref 11.5–14.5)
GLUCOSE SERPL-MCNC: 208 MG/DL (ref 74–99)
HCT VFR BLD AUTO: 43.3 % (ref 36–46)
HGB BLD-MCNC: 13.8 G/DL (ref 12–16)
MCH RBC QN AUTO: 29.1 PG (ref 26–34)
MCHC RBC AUTO-ENTMCNC: 31.9 G/DL (ref 32–36)
MCV RBC AUTO: 91 FL (ref 80–100)
NRBC BLD-RTO: 0 /100 WBCS (ref 0–0)
PLATELET # BLD AUTO: 242 X10*3/UL (ref 150–450)
POTASSIUM SERPL-SCNC: 3.3 MMOL/L (ref 3.5–5.3)
PROT SERPL-MCNC: 7.4 G/DL (ref 6.4–8.2)
RBC # BLD AUTO: 4.74 X10*6/UL (ref 4–5.2)
SODIUM SERPL-SCNC: 142 MMOL/L (ref 136–145)
TEST COMMENT - SURGICAL SENDOUT REQUEST: NORMAL
WBC # BLD AUTO: 10 X10*3/UL (ref 4.4–11.3)

## 2024-12-17 PROCEDURE — 36415 COLL VENOUS BLD VENIPUNCTURE: CPT

## 2024-12-17 PROCEDURE — 3008F BODY MASS INDEX DOCD: CPT | Performed by: INTERNAL MEDICINE

## 2024-12-17 PROCEDURE — 80053 COMPREHEN METABOLIC PANEL: CPT

## 2024-12-17 PROCEDURE — 3079F DIAST BP 80-89 MM HG: CPT | Performed by: INTERNAL MEDICINE

## 2024-12-17 PROCEDURE — 4010F ACE/ARB THERAPY RXD/TAKEN: CPT | Performed by: INTERNAL MEDICINE

## 2024-12-17 PROCEDURE — 1159F MED LIST DOCD IN RCRD: CPT | Performed by: INTERNAL MEDICINE

## 2024-12-17 PROCEDURE — 99205 OFFICE O/P NEW HI 60 MIN: CPT | Performed by: INTERNAL MEDICINE

## 2024-12-17 PROCEDURE — 3075F SYST BP GE 130 - 139MM HG: CPT | Performed by: INTERNAL MEDICINE

## 2024-12-17 PROCEDURE — 85027 COMPLETE CBC AUTOMATED: CPT

## 2024-12-17 PROCEDURE — 3050F LDL-C >= 130 MG/DL: CPT | Performed by: INTERNAL MEDICINE

## 2024-12-17 PROCEDURE — 3044F HG A1C LEVEL LT 7.0%: CPT | Performed by: INTERNAL MEDICINE

## 2024-12-17 NOTE — PROGRESS NOTES
Patient ID: Serjio Nunes is a 74 y.o. female who presents for New Patient Visit (Endocrine consult. Referred by  for her thyroid.).  HPI  The patient is referred for evaluation of multinodular goiter.    This is a 74-year-old female who was found to have incidental thyroid nodules.    On August 7 she had an ultrasound which revealed a right lobe that was 4.2 x 1.7 x 1.7 cm with a solid isoechoic TI-RADS three 1.0 x 0.8 x 1.0 cm nodule and a solid isoechoic TI-RADS three 2.2 x 1.8 x 1.1 cm nodule.  The left side was 5.3 x 3.0 x 2.8 cm with a solid isoechoic TI-RADS 3 mid lobe nodule that was 3.7 x 2.7 x 2.5 cm and a TI-RADS 3 solid isoechoic 2.6 x 1.7 x 2.4 cm nodule.    She underwent fine-needle aspiration on August 26 and the lower pole nodule was found to be a benign follicular nodule in the midpole nodule was a follicular lesion of undetermined significance with the ability to perform molecular testing which was not ordered.    She notes no change within her thyroid although does have some pain at the biopsy site.    She does complain of fatigue but no other symptoms of hyper or hypothyroidism and had normal thyroid function in August.    She has had no obstructive symptoms and no head or neck irradiation.    She has a past history of type 2 diabetes renal insufficiency hypertension hyperlipidemia and gout.    Socially she is  retired ex-smoker nondrinker.    Family history negative for thyroid diabetes in a father.    ROS  Comprehensive review of systems is negative.    Objective   Physical Exam  Visit Vitals  /84      Vitals:    12/17/24 1440   Weight: 78.5 kg (173 lb)      Body mass index is 30.65 kg/m².      Alert and oriented x3  In no distress  No focal neurologic deficits  No supraclavicular or dorsal fat  No purple striae  Integument intact    ENT normal. No adenopathy  Fundi normal  Thyroid palpable 5 to 40 g nodular  Chest clear to auscultation  Heart sounds are normal  Abdomen  nontender. Bowel sounds normal. No organomegaly  Feet are okay  Decreased vibration and monofilament sensation normal pulses, no lesions    Current Outpatient Medications   Medication Sig Dispense Refill    albuterol sulfate (Proair Digihaler) 90 mcg/actuation aero powdr breath act w/sensor inhaler Inhale 2 puffs every 6 hours if needed for wheezing.      amLODIPine (Norvasc) 10 mg tablet TAKE 1 TABLET BY MOUTH EVERY DAY 90 tablet 3    aspirin 81 mg EC tablet Take 1 tablet (81 mg) by mouth once daily.      atorvastatin (Lipitor) 40 mg tablet Take 1 tablet (40 mg) by mouth once daily. 30 tablet 11    busPIRone (Buspar) 5 mg tablet TAKE 1 TABLET (5 MG) BY MOUTH 2 TIMES A DAY AS NEEDED (WHEN FEELING ANXIOUS). 180 tablet 3    carvedilol (Coreg) 25 mg tablet TAKE 1 TABLET BY MOUTH TWICE A  tablet 3    cholecalciferol (Vitamin D-3) 50 MCG (2000 UT) tablet Take 1 tablet (2,000 Units) by mouth once daily.      clopidogrel (Plavix) 75 mg tablet Take 1 tablet (75 mg) by mouth once daily. 90 tablet 0    colchicine 0.6 mg tablet Take 1 tablet (0.6 mg) by mouth once daily. 90 tablet 1    empagliflozin (Jardiance) 25 mg Take 1 tablet (25 mg) by mouth once daily. 90 tablet 1    fluocinonide 0.05 % cream Apply topically 2 times a day. APPLY SPARINGLY TO AFFECTED AREA      fluticasone (Flonase) 50 mcg/actuation nasal spray Administer 1 spray into each nostril once daily.      fluticasone propion-salmeteroL (Advair Diskus) 250-50 mcg/dose diskus inhaler Inhale 1 puff 2 times a day.      FreeStyle Jaycob 14 Day Sensor kit APPLY SENSOR TO BACK UPPER ARM REMOVE AND REPLACE EVERY 14 DAYS USE WITH DEVICE 10 each 3    furosemide (Lasix) 40 mg tablet Take 0.5 tablets (20 mg) by mouth once daily. 30 tablet 3    ipratropium-albuteroL (Duo-Neb) 0.5-2.5 mg/3 mL nebulizer solution Take 3 mL by nebulization every 6 hours.      Klor-Con M10 10 mEq ER tablet TAKE 1 TABLET BY MOUTH EVERY DAY 90 tablet 3    losartan (Cozaar) 100 mg tablet Take  0.5 tablets (50 mg) by mouth once daily. 90 tablet 0    miconazole (Micotin) 2 % cream miconazole nitrate 2 % topical cream   APPLY SPARINGLY TO AFFECTED AREA TWICE A DAY      nitroglycerin (Nitrostat) 0.4 mg SL tablet Place 1 tablet (0.4 mg) under the tongue every 5 minutes if needed for chest pain. 90 tablet 11    pantoprazole (ProtoNix) 40 mg EC tablet Take 1 tablet (40 mg) by mouth once daily in the morning before meals. Do not crush, chew, or split. 90 tablet 0    tiZANidine (Zanaflex) 4 mg tablet TAKE 1 TABLET (4 MG) BY MOUTH EVERY 6 HOURS IF NEEDED FOR MUSCLE SPASMS. 90 tablet 1    Tradjenta 5 mg tablet TAKE 1 TABLET BY MOUTH EVERY DAY AS DIRECTED 90 tablet 1     No current facility-administered medications for this visit.       Assessment/Plan     1.  Multinodular goiter  2.  Type 2 diabetes  3.  Hypertension  4.  Hyperlipidemia    We reviewed the ultrasound.    We discussed the guidelines.    The 1 cm TI-RADS 3 nodule on the right needs no further follow-up.    The 2.2 cm TI-RADS 3 nodule on the right should be followed with yearly ultrasounds for a total of 5 years.    The lower TI-RADS three 2.6 cm nodule that was benign by biopsy should be followed with yearly ultrasounds for a total of 5 years.    With regards to the 3.7 cm TI-RADS 3 nodule with the results being follicular lesion of undetermined significance will proceed with molecular testing.    I ordered the testing and we will await the results.    We discussed the potential outcomes.    If benign would plan to follow-up with ultrasound yearly with the next one due in August.    If not would refer her to surgery.    We discussed thyroid function as well as thyroid symptoms as well as her nonspecificity.    We discussed the diabetes and she will continue her current regimen.    Follow-up with me will be dependent on results.    I spent 60 minutes with this patient.  Greater than 50% of this time was spent in counseling and/or coordination of  care.

## 2024-12-19 ENCOUNTER — TELEPHONE (OUTPATIENT)
Dept: PRIMARY CARE | Facility: CLINIC | Age: 74
End: 2024-12-19

## 2024-12-19 ENCOUNTER — APPOINTMENT (OUTPATIENT)
Dept: PRIMARY CARE | Facility: CLINIC | Age: 74
End: 2024-12-19
Payer: COMMERCIAL

## 2024-12-19 VITALS
DIASTOLIC BLOOD PRESSURE: 77 MMHG | OXYGEN SATURATION: 96 % | HEART RATE: 62 BPM | WEIGHT: 172.8 LBS | BODY MASS INDEX: 30.61 KG/M2 | SYSTOLIC BLOOD PRESSURE: 138 MMHG

## 2024-12-19 DIAGNOSIS — E08.21 DIABETES MELLITUS DUE TO UNDERLYING CONDITION, CONTROLLED, WITH DIABETIC NEPHROPATHY, WITHOUT LONG-TERM CURRENT USE OF INSULIN: ICD-10-CM

## 2024-12-19 DIAGNOSIS — I73.9 PAD (PERIPHERAL ARTERY DISEASE) (CMS-HCC): ICD-10-CM

## 2024-12-19 DIAGNOSIS — I10 HYPERTENSION: Primary | ICD-10-CM

## 2024-12-19 DIAGNOSIS — E04.2 MULTINODULAR GOITER: ICD-10-CM

## 2024-12-19 DIAGNOSIS — E87.6 HYPOKALEMIA: Primary | ICD-10-CM

## 2024-12-19 DIAGNOSIS — M79.7 FIBROMYALGIA: ICD-10-CM

## 2024-12-19 PROCEDURE — 3078F DIAST BP <80 MM HG: CPT | Performed by: INTERNAL MEDICINE

## 2024-12-19 PROCEDURE — 3050F LDL-C >= 130 MG/DL: CPT | Performed by: INTERNAL MEDICINE

## 2024-12-19 PROCEDURE — 3075F SYST BP GE 130 - 139MM HG: CPT | Performed by: INTERNAL MEDICINE

## 2024-12-19 PROCEDURE — 3044F HG A1C LEVEL LT 7.0%: CPT | Performed by: INTERNAL MEDICINE

## 2024-12-19 PROCEDURE — 4010F ACE/ARB THERAPY RXD/TAKEN: CPT | Performed by: INTERNAL MEDICINE

## 2024-12-19 PROCEDURE — 99214 OFFICE O/P EST MOD 30 MIN: CPT | Performed by: INTERNAL MEDICINE

## 2024-12-19 RX ORDER — LIDOCAINE 40 MG/G
CREAM TOPICAL 3 TIMES DAILY PRN
Qty: 120 G | Refills: 1 | Status: SHIPPED | OUTPATIENT
Start: 2024-12-19 | End: 2025-12-19

## 2024-12-19 NOTE — ASSESSMENT & PLAN NOTE
Take Clor-Austen 10mg   - 2 pills 3 days a week (Monday, Wednesday, Friday)  - continue 1 pill the other days (Tuesday, Thursday, Saturday, Sunday)

## 2024-12-19 NOTE — PROGRESS NOTES
Subjective   Chief complaint: Serjio Nunes is a 74 y.o. female who presents for No chief complaint on file..    HPI:  Pt presents for follow-up.   S/p angioplasty for PAD. Feeling OK.     Endorses pain in back, feet and hands -  attributes this to worsened fibromyalgia symptoms.             Objective   /77   Pulse 62   Wt 78.4 kg (172 lb 12.8 oz)   SpO2 96%   BMI 30.61 kg/m²   Physical Exam  Constitutional:       General: She is not in acute distress.     Appearance: Normal appearance. She is not ill-appearing or toxic-appearing.   HENT:      Head: Normocephalic and atraumatic.      Right Ear: External ear normal.      Left Ear: External ear normal.      Nose: Nose normal.   Eyes:      Extraocular Movements: Extraocular movements intact.   Cardiovascular:      Rate and Rhythm: Normal rate and regular rhythm.      Heart sounds: Normal heart sounds.   Pulmonary:      Effort: Pulmonary effort is normal. No respiratory distress.      Breath sounds: Normal breath sounds. No wheezing.   Musculoskeletal:      Cervical back: Neck supple.   Skin:     General: Skin is warm and dry.      Coloration: Skin is not jaundiced.   Neurological:      General: No focal deficit present.      Mental Status: She is alert.   Psychiatric:         Mood and Affect: Mood normal.         Behavior: Behavior normal.         Thought Content: Thought content normal.         Judgment: Judgment normal.         I have reviewed and reconciled the medication list with the patient today.   Current Outpatient Medications:     albuterol sulfate (Proair Digihaler) 90 mcg/actuation aero powdr breath act w/sensor inhaler, Inhale 2 puffs every 6 hours if needed for wheezing., Disp: , Rfl:     amLODIPine (Norvasc) 10 mg tablet, TAKE 1 TABLET BY MOUTH EVERY DAY, Disp: 90 tablet, Rfl: 3    aspirin 81 mg EC tablet, Take 1 tablet (81 mg) by mouth once daily., Disp: , Rfl:     atorvastatin (Lipitor) 40 mg tablet, Take 1 tablet (40 mg) by mouth once  daily., Disp: 30 tablet, Rfl: 11    busPIRone (Buspar) 5 mg tablet, TAKE 1 TABLET (5 MG) BY MOUTH 2 TIMES A DAY AS NEEDED (WHEN FEELING ANXIOUS)., Disp: 180 tablet, Rfl: 3    carvedilol (Coreg) 25 mg tablet, TAKE 1 TABLET BY MOUTH TWICE A DAY, Disp: 180 tablet, Rfl: 3    cholecalciferol (Vitamin D-3) 50 MCG (2000 UT) tablet, Take 1 tablet (2,000 Units) by mouth once daily., Disp: , Rfl:     clopidogrel (Plavix) 75 mg tablet, Take 1 tablet (75 mg) by mouth once daily., Disp: 90 tablet, Rfl: 0    colchicine 0.6 mg tablet, Take 1 tablet (0.6 mg) by mouth once daily., Disp: 90 tablet, Rfl: 1    empagliflozin (Jardiance) 25 mg, Take 1 tablet (25 mg) by mouth once daily., Disp: 90 tablet, Rfl: 1    fluocinonide 0.05 % cream, Apply topically 2 times a day. APPLY SPARINGLY TO AFFECTED AREA, Disp: , Rfl:     fluticasone (Flonase) 50 mcg/actuation nasal spray, Administer 1 spray into each nostril once daily., Disp: , Rfl:     fluticasone propion-salmeteroL (Advair Diskus) 250-50 mcg/dose diskus inhaler, Inhale 1 puff 2 times a day., Disp: , Rfl:     FreeStyle Jaycob 14 Day Sensor kit, APPLY SENSOR TO BACK UPPER ARM REMOVE AND REPLACE EVERY 14 DAYS USE WITH DEVICE, Disp: 10 each, Rfl: 3    furosemide (Lasix) 40 mg tablet, Take 0.5 tablets (20 mg) by mouth once daily., Disp: 30 tablet, Rfl: 3    ipratropium-albuteroL (Duo-Neb) 0.5-2.5 mg/3 mL nebulizer solution, Take 3 mL by nebulization every 6 hours., Disp: , Rfl:     Klor-Con M10 10 mEq ER tablet, TAKE 1 TABLET BY MOUTH EVERY DAY, Disp: 90 tablet, Rfl: 3    losartan (Cozaar) 100 mg tablet, Take 0.5 tablets (50 mg) by mouth once daily., Disp: 90 tablet, Rfl: 0    miconazole (Micotin) 2 % cream, miconazole nitrate 2 % topical cream  APPLY SPARINGLY TO AFFECTED AREA TWICE A DAY, Disp: , Rfl:     nitroglycerin (Nitrostat) 0.4 mg SL tablet, Place 1 tablet (0.4 mg) under the tongue every 5 minutes if needed for chest pain., Disp: 90 tablet, Rfl: 11    pantoprazole (ProtoNix) 40 mg  EC tablet, Take 1 tablet (40 mg) by mouth once daily in the morning before meals. Do not crush, chew, or split., Disp: 90 tablet, Rfl: 0    tiZANidine (Zanaflex) 4 mg tablet, TAKE 1 TABLET (4 MG) BY MOUTH EVERY 6 HOURS IF NEEDED FOR MUSCLE SPASMS., Disp: 90 tablet, Rfl: 1    Tradjenta 5 mg tablet, TAKE 1 TABLET BY MOUTH EVERY DAY AS DIRECTED, Disp: 90 tablet, Rfl: 1     Imaging:  Vascular US ankle brachial index (KWADWO) with exercise    Result Date: 12/5/2024             [place] [address]   Tel [phone] and Fax [phone]  Vascular Lab Report [facility] US ANKLE BRACHIAL INDEX (KWADWO) WITH EXERCISE  Patient Name:      SKINNY Power Physician:  [id number] Jordan Parra DO Study Date:        12/5/2024           Ordering Physician: [id number] GRAY MRN/PID:           [id number]            Technologist:       Ariela Avalos                                                            T Accession#:        [id number]        Technologist 2: Date of Birth/Age: 1950 / 74      Encounter#:         [id number]                    years Gender:            F Admission Status:  Outpatient          Location Performed: [facility]  Diagnosis/ICD: Peripheral vascular disease, unspecified-I73.9 CPT Codes:     14679.52 Peripheral artery PVR with exercise Reduced Service  CONCLUSIONS: Right Lower PVR: Evidence of mild arterial occlusive disease in the right lower extremity at rest. Decreased digital perfusion noted. Multiphasic flow is noted in the right common femoral artery, right posterior tibial artery and right dorsalis pedis artery. Left Lower PVR: Evidence of mild arterial occlusive disease in the left lower extremity at rest. Decreased digital perfusion noted. Multiphasic flow is noted in the left common femoral artery, left posterior tibial artery and left dorsalis pedis artery. Exercise:Exercise completed at 2 1/2 miles per hour for 2 minutes and 30 seconds. The  patient experienced pain in the right calf and the left calf at 1 minute. The exam was terminated due to shortness of breath.  Comparison: Compared with study from 7/26/2024, Right KWADWO appears to be improve post procedure from 0.70 to 0.87. Left KWADWO has decreased from prior study from 0.81 to 0.72.  Imaging & Doppler Findings:  RIGHT Lower PVR                Pressures Ratios Right Posterior Tibial (Ankle) 117 mmHg  0.87 Right Dorsalis Pedis (Ankle)   115 mmHg  0.85 Right Digit (Great Toe)        60 mmHg   0.44  Right Ankle Post Exercise      109 mmHg  0.87  LEFT Lower PVR                Pressures Ratios Left Posterior Tibial (Ankle) 74 mmHg   0.55 Left Dorsalis Pedis (Ankle)   97 mmHg   0.72 Left Digit (Great Toe)        56 mmHg   0.41  Left Ankle Post Exercise      90 mmHg   0.71                    Right     Left Brachial Pressure 135 mmHg 131 mmHg   [id number] Jordan Parra DO Electronically signed by [id number] Jordan Parra DO on 12/5/2024 at 2:36:37 PM  ** Final **        Labs reviewed:    Lab Results   Component Value Date    WBC 10.0 12/17/2024    HGB 13.8 12/17/2024    HCT 43.3 12/17/2024     12/17/2024    CHOL 234 (H) 08/05/2024    TRIG 249 (H) 08/05/2024    HDL 34.6 08/05/2024    ALT 14 12/17/2024    AST 12 12/17/2024     12/17/2024    K 3.3 (L) 12/17/2024     12/17/2024    CREATININE 1.39 (H) 12/17/2024    BUN 17 12/17/2024    CO2 28 12/17/2024    TSH 2.64 08/05/2024    INR 1.0 09/04/2024    HGBA1C 6.2 (H) 08/05/2024       Assessment/Plan   Problem List Items Addressed This Visit       Fibromyalgia     Pain in extremities - sometimes feels like tingling   Does not want gabapentin due to side effects     Use topical asper cream lidocaine 3x/day for relief          Hypokalemia - Primary     Take Clor-Austen 10mg   - 2 pills 3 days a week (Monday, Wednesday, Friday)  - continue 1 pill the other days (Tuesday, Thursday, Saturday, Sunday)         Diabetes mellitus due to underlying condition, controlled,  with diabetic nephropathy, without long-term current use of insulin     Blood glucose 152 on exam         PAD (peripheral artery disease) (CMS-[facility])     S/p angioplasty     Discontinue aspirin; use only plavix         Multinodular goiter     Follows with Endo            Continue current medications as listed  Follow up in 3 months.

## 2024-12-19 NOTE — ASSESSMENT & PLAN NOTE
Pain in extremities - sometimes feels like tingling   Does not want gabapentin due to side effects     Use topical asper cream lidocaine 3x/day for relief

## 2024-12-23 ENCOUNTER — APPOINTMENT (OUTPATIENT)
Dept: RADIOLOGY | Facility: HOSPITAL | Age: 74
End: 2024-12-23
Payer: COMMERCIAL

## 2024-12-26 ENCOUNTER — CLINICAL SUPPORT (OUTPATIENT)
Dept: CARDIAC REHAB | Facility: CLINIC | Age: 74
End: 2024-12-26
Payer: COMMERCIAL

## 2024-12-26 VITALS
WEIGHT: 172 LBS | OXYGEN SATURATION: 92 % | SYSTOLIC BLOOD PRESSURE: 138 MMHG | DIASTOLIC BLOOD PRESSURE: 77 MMHG | BODY MASS INDEX: 29.37 KG/M2 | HEIGHT: 64 IN | HEART RATE: 62 BPM | RESPIRATION RATE: 12 BRPM

## 2024-12-26 DIAGNOSIS — I70.211 ATHEROSCLEROSIS OF NATIVE ARTERY OF RIGHT LOWER EXTREMITY WITH INTERMITTENT CLAUDICATION (CMS-HCC): Primary | ICD-10-CM

## 2024-12-26 ASSESSMENT — DUKE ACTIVITY SCORE INDEX (DASI)
CAN YOU DO MODERATE WORK AROUND THE HOUSE LIKE VACUUMING, SWEEPING FLOORS OR CARRYING GROCERIES: YES
CAN YOU WALK INDOORS, SUCH AS AROUND YOUR HOUSE: YES
CAN YOU DO YARD WORK LIKE RAKING LEAVES, WEEDING OR PUSHING A MOWER: NO
CAN YOU DO HEAVY WORK AROUND THE HOUSE LIKE SCRUBBING FLOORS OR LIFTING AND MOVING HEAVY FURNITURE: NO
CAN YOU WALK A BLOCK OR TWO ON LEVEL GROUND: YES
TOTAL_SCORE: 18.95
CAN YOU PARTICIPATE IN STRENOUS SPORTS LIKE SWIMMING, SINGLES TENNIS, FOOTBALL, BASKETBALL, OR SKIING: NO
CAN YOU TAKE CARE OF YOURSELF (EAT, DRESS, BATHE, OR USE TOILET): YES
CAN YOU HAVE SEXUAL RELATIONS: NO
CAN YOU PARTICIPATE IN MODERATE RECREATIONAL ACTIVITIES LIKE GOLF, BOWLING, DANCING, DOUBLES TENNIS OR THROWING A BASEBALL OR FOOTBALL: NO
DASI METS SCORE: 5.1
CAN YOU RUN A SHORT DISTANCE: NO
CAN YOU CLIMB A FLIGHT OF STAIRS OR WALK UP A HILL: YES
CAN YOU DO LIGHT WORK AROUND THE HOUSE LIKE DUSTING OR WASHING DISHES: YES

## 2024-12-26 ASSESSMENT — PATIENT HEALTH QUESTIONNAIRE - PHQ9
6. FEELING BAD ABOUT YOURSELF - OR THAT YOU ARE A FAILURE OR HAVE LET YOURSELF OR YOUR FAMILY DOWN: NOT AT ALL
SUM OF ALL RESPONSES TO PHQ QUESTIONS 1-9: 4
4. FEELING TIRED OR HAVING LITTLE ENERGY: SEVERAL DAYS
1. LITTLE INTEREST OR PLEASURE IN DOING THINGS: NOT AT ALL
2. FEELING DOWN, DEPRESSED OR HOPELESS: NOT AT ALL
7. TROUBLE CONCENTRATING ON THINGS, SUCH AS READING THE NEWSPAPER OR WATCHING TELEVISION: NOT AT ALL
3. TROUBLE FALLING OR STAYING ASLEEP OR SLEEPING TOO MUCH: NEARLY EVERY DAY
8. MOVING OR SPEAKING SO SLOWLY THAT OTHER PEOPLE COULD HAVE NOTICED. OR THE OPPOSITE, BEING SO FIGETY OR RESTLESS THAT YOU HAVE BEEN MOVING AROUND A LOT MORE THAN USUAL: NOT AT ALL
SUM OF ALL RESPONSES TO PHQ QUESTIONS 1-9: 4
5. POOR APPETITE OR OVEREATING: NOT AT ALL
SUM OF ALL RESPONSES TO PHQ9 QUESTIONS 1 & 2: 0
9. THOUGHTS THAT YOU WOULD BE BETTER OFF DEAD, OR OF HURTING YOURSELF: NOT AT ALL

## 2024-12-26 NOTE — PROGRESS NOTES
Vascular Rehabilitation Initial Treatment Plan    Name: Serjio Nunes  Medical Record Number: [id number]  YOB: 1950  Age: 74 y.o.    Today’s Date: 12/26/2024  Primary Care Physician: Raymond Carnes MD  Referring Physician: Keyla Vo MD  Program Location: [address]  Primary Diagnosis:   1. Atherosclerosis of native artery of right lower extremity with intermittent claudication (CMS-HCC)  Referral to Vascular Rehab         Onset/Date of Diagnosis: 11/6/2024    Initial Assessment, not yet started program.    AACVPR Risk Stratification: High     Falls Risk: High  Psychosocial Assessment     Pre PHQ-9: 4      Sent PH-Q 9 to MD if score > 20: No; score < 20    Pt reported/currently experiencing stress: Yes; Stress; Severity: mild, Anxiety; Severity: mild, and Depression; Severity: mild  Patient uses stress management skills: Yes   History of: anxiety Depression  Currently seeing a mental health provider: No  Social Support: Yes, Whom:family  Quality of Life Survey: PAQ  Learning Assessment:  Learning assessment/barriers: None  Preferred learning method: Auditory and Visual  Barriers: None  Comments:    Stages of Change:Preparation    Psychosocial Plan    Goal Status: Initial Assessment; goals not yet started    Psychosocial Goals: Demonstrating proper techniques for stress management, Maintain or lower PH-Q 9 score by discharge, and Identify strategies for managing depression    Psychosocial Interventions/Education: To be done in Vascular Rehab.    Initial Assessment: in progress    Nutrition Assessment:    Hyperlipidemia: Yes     Lipids:   Lab Results   Component Value Date    CHOL 234 (H) 08/05/2024    HDL 34.6 08/05/2024    LDLF 119 (H) 06/20/2023    TRIG 249 (H) 08/05/2024       Current Dietary Guidelines: Low fat, Low sodium  Barriers to dietary change: no    Diet Habit Survey: Picture Your Plate  Pre: Initial survey given. Pending completion and return from  "patient.  Post: To be done at discharge.    Diabetes Assessment    Lab Results   Component Value Date    HGBA1C 6.2 (H) 08/05/2024       History of Diabetes: Yes Pt monitors BS at home: Yes   Frequency: 1 /day  FBS range: 105 - 120  Hypoglycemic Episodes: Yes     Weight Management    Height: 162 cm (5' 3.78\")  Weight: 78 kg (172 lb)  BMI (Calculated): 29.73  No data recorded    Nutrition Plan    Goal Status: Initial Assessment; goals not yet started    Nutrition Goals: Lipid Goal: HDL>45, LDL <70, Total <180, Trigs <150, Improve Diet Habit Survey score by 5-10 points by discharge, Adapt a low-sodium, DASH diet prior to discharge, Adapt a Mediterranean focused diet prior to discharge, Learn how to read and interpret nutrition labels prior to discharge, and Lose 1lb/week while enrolled in program    Nutrition Interventions/Education:   To be done in Vascular Rehab.    Initial Assessment: in progress    Exercise Assessment    No  Mode: NA  Frequency: NA  Duration: NA    Exercise Prescription     Exercise Prescription based on: Duke Activity Status Index (DASI)    DASI Score: 18.95   MET Score: 5.1   Frequency:  3 days/week   Mode: Treadmill, NuStep, and Recumbent Cycle   Duration: 30 total aerobic minutes   Intensity: RPE 12-16  Target HR:  To be calculated after 6 attended sessions.  MET Level: 2.2  Patient wears supplemental O2: No     Modality Workload METs Duration (minutes)   1 Pre-Exercise      2 Treadmill 1.8  2.4 15 :00   3 Rest    5 :00   4 Treadmill 1.8  2.4 15 :00   5 Rest    5 :00   6 Post-Exercise        Resistance Training: No   Home Exercise Prescription given: To be given at session # 6    Exercise Plan    Goal Status: Initial Assessment; goals not yet started    Exercise Goals: Increase exercise MET level by 5-10% each week, Increase total exercise duration to 30-45 minutes, and Establish a home exercise program before discharge    Exercise Interventions/Education:   To be done in Vascular " Rehab.    Initial Assessment: in progress    Other Core Components/Risk Factor Assessment:    Medication adherence  Current Medications:   Medication Documentation Review Audit       Reviewed by Hannah Givens RN (Registered Nurse) on 12/26/24 at 0821      Medication Order Taking? Sig Documenting Provider Last Dose Status   albuterol sulfate (Proair Digihaler) 90 mcg/actuation aero powdr breath act w/sensor inhaler [id number] Yes Inhale 2 puffs every 6 hours if needed for wheezing. Historical Provider, MD  Active   amLODIPine (Norvasc) 10 mg tablet [id number] Yes TAKE 1 TABLET BY MOUTH EVERY DAY Raymond Carnes MD  Active   aspirin 81 mg EC tablet [id number] Yes Take 1 tablet (81 mg) by mouth once daily. Historical Provider, MD  Active   atorvastatin (Lipitor) 40 mg tablet [id number] Yes Take 1 tablet (40 mg) by mouth once daily. Maritza Smith MD, MS  Active   busPIRone (Buspar) 5 mg tablet [id number] Yes TAKE 1 TABLET (5 MG) BY MOUTH 2 TIMES A DAY AS NEEDED (WHEN FEELING ANXIOUS). Lin Hernadez APRN-CNP  Active   carvedilol (Coreg) 25 mg tablet [id number] Yes TAKE 1 TABLET BY MOUTH TWICE A DAY Raymond Carnes MD  Active   cholecalciferol (Vitamin D-3) 50 MCG (2000 UT) tablet [id number] Yes Take 1 tablet (2,000 Units) by mouth once daily. Historical Provider, MD  Active   clopidogrel (Plavix) 75 mg tablet [id number] Yes Take 1 tablet (75 mg) by mouth once daily. Nixon Gamble APRN-CNP, DNP  Active   colchicine 0.6 mg tablet [id number] Yes Take 1 tablet (0.6 mg) by mouth once daily. Raymond Carnes MD  Active   empagliflozin (Jardiance) 25 mg [id number] Yes Take 1 tablet (25 mg) by mouth once daily. Raymond Carnes MD  Active   fluocinonide 0.05 % cream [id number] Yes Apply topically 2 times a day. APPLY SPARINGLY TO AFFECTED AREA Historical Provider, MD  Active   fluticasone (Flonase) 50 mcg/actuation nasal spray [id number] Yes Administer 1 spray into each nostril once daily. Historical Provider, MD  Active    fluticasone propion-salmeteroL (Advair Diskus) 250-50 mcg/dose diskus inhaler [id number] Yes Inhale 1 puff 2 times a day. Historical Provider, MD  Active   FreeStyle Jaycob 14 Day Sensor kit [id number]  APPLY SENSOR TO BACK UPPER ARM REMOVE AND REPLACE EVERY 14 DAYS USE WITH DEVICE Raymond Carnes MD  Active   furosemide (Lasix) 40 mg tablet [id number]  Take 0.5 tablets (20 mg) by mouth once daily. Raymond Carnes MD  Active   ipratropium-albuteroL (Duo-Neb) 0.5-2.5 mg/3 mL nebulizer solution [id number] Yes Take 3 mL by nebulization every 6 hours. Historical Provider, MD  Active   Klor-Con M10 10 mEq ER tablet [id number] Yes TAKE 1 TABLET BY MOUTH EVERY DAY Raymond Carnes MD  Active   lidocaine (LMX) 4 % cream [id number]  Apply topically 3 times a day as needed for mild pain (1 - 3). Raymond Carnes MD  Active   losartan (Cozaar) 100 mg tablet [id number] Yes Take 0.5 tablets (50 mg) by mouth once daily. Raymond Carnes MD  Active   miconazole (Micotin) 2 % cream [id number] Yes miconazole nitrate 2 % topical cream   APPLY SPARINGLY TO AFFECTED AREA TWICE A DAY Historical Provider, MD  Active   nitroglycerin (Nitrostat) 0.4 mg SL tablet [id number] Yes Place 1 tablet (0.4 mg) under the tongue every 5 minutes if needed for chest pain. Brody MOON MD  Active   pantoprazole (ProtoNix) 40 mg EC tablet [id number]  Take 1 tablet (40 mg) by mouth once daily in the morning before meals. Do not crush, chew, or split. Nixon Gamble, APRN-CNP, DNP  Active   tiZANidine (Zanaflex) 4 mg tablet [id number] Yes TAKE 1 TABLET (4 MG) BY MOUTH EVERY 6 HOURS IF NEEDED FOR MUSCLE SPASMS. Raymond Carnes MD  Active   Tradjenta 5 mg tablet [id number] Yes TAKE 1 TABLET BY MOUTH EVERY DAY AS DIRECTED Raymond Carnes MD  Active                                 Medication compliance: Yes   Uses pill box/organizer: Yes    Carries medication list: No     Blood Pressure Management  History of Hypertension: Yes   Medication Changes: No   Resting BP:   Visit Vitals  /77 (BP Location: Left arm, Patient Position: Sitting, BP Cuff Size: Adult)   Pulse 62   Resp 12        Heart Failure Management  Hx of Heart Failure: No    Smoking/Tobacco Assessment  Social History     Tobacco Use   Smoking Status Every Day    Current packs/day: 0.00    Average packs/day: 0.5 packs/day for 48.0 years (24.0 ttl pk-yrs)    Types: Cigarettes    Start date:     Last attempt to quit:     Years since quittin.9   Smokeless Tobacco Never       Other Core Component Plan    Goal Status: Initial Assessment; goals not yet started    Other Core Component Goals: Medication compliance, Verbalize medication usage and drug actions by discharge, Verbalize SL NTG action and proper dosage by discharge, Set tobacco cessation quit date while enrolled, and Achieve resting BP of < 130/80 by discharge    Other Core Component Interventions/Education:   Medication compliance and Begin tobacco cessation program    Initial Assessment: in progress    Individual Patient Goals:    Be able to walk for for 30 min without stopping by discharge  Be able to walk with her daughter while walking the dogs 1-2 miles by D/c    Goal Status: Initial Assessment; goals not yet started    Staff Comments:  Attend 3 days a week    Rehab Staff Signature: Hannah Givens RN

## 2024-12-30 ENCOUNTER — CLINICAL SUPPORT (OUTPATIENT)
Dept: CARDIAC REHAB | Facility: CLINIC | Age: 74
End: 2024-12-30
Payer: COMMERCIAL

## 2024-12-30 DIAGNOSIS — I70.211 ATHEROSCLEROSIS OF NATIVE ARTERY OF RIGHT LOWER EXTREMITY WITH INTERMITTENT CLAUDICATION (CMS-HCC): ICD-10-CM

## 2024-12-30 PROCEDURE — 93668 PERIPHERAL VASCULAR REHAB: CPT | Performed by: INTERNAL MEDICINE

## 2025-01-03 ENCOUNTER — CLINICAL SUPPORT (OUTPATIENT)
Dept: CARDIAC REHAB | Facility: CLINIC | Age: 75
End: 2025-01-03
Payer: COMMERCIAL

## 2025-01-03 DIAGNOSIS — I70.211 ATHEROSCLEROSIS OF NATIVE ARTERY OF RIGHT LOWER EXTREMITY WITH INTERMITTENT CLAUDICATION (CMS-HCC): ICD-10-CM

## 2025-01-03 PROCEDURE — 93668 PERIPHERAL VASCULAR REHAB: CPT | Performed by: INTERNAL MEDICINE

## 2025-01-06 ENCOUNTER — APPOINTMENT (OUTPATIENT)
Dept: CARDIAC REHAB | Facility: CLINIC | Age: 75
End: 2025-01-06
Payer: COMMERCIAL

## 2025-01-08 ENCOUNTER — CLINICAL SUPPORT (OUTPATIENT)
Dept: CARDIAC REHAB | Facility: CLINIC | Age: 75
End: 2025-01-08
Payer: COMMERCIAL

## 2025-01-08 DIAGNOSIS — I70.211 ATHEROSCLEROSIS OF NATIVE ARTERY OF RIGHT LOWER EXTREMITY WITH INTERMITTENT CLAUDICATION (CMS-HCC): ICD-10-CM

## 2025-01-08 PROCEDURE — 93668 PERIPHERAL VASCULAR REHAB: CPT | Performed by: INTERNAL MEDICINE

## 2025-01-08 NOTE — PROGRESS NOTES
Cardiac Rehab Education  Serjio Nunes   [id number]    1/8/2025  Stretching education was done with patient during today's rehab session. Demonstrations of standing stretches were done with patient, in addition to discussing the benefits of flexibility training. Patient was provided handout with examples of exercises and further instructions to be practiced at home. Patient encouraged to report to rehab staff with any questions or concerns.      Evelyn Givens RN

## 2025-01-10 ENCOUNTER — APPOINTMENT (OUTPATIENT)
Dept: CARDIAC REHAB | Facility: CLINIC | Age: 75
End: 2025-01-10
Payer: COMMERCIAL

## 2025-01-15 ENCOUNTER — CLINICAL SUPPORT (OUTPATIENT)
Dept: CARDIAC REHAB | Facility: CLINIC | Age: 75
End: 2025-01-15
Payer: COMMERCIAL

## 2025-01-15 DIAGNOSIS — I70.211 ATHEROSCLEROSIS OF NATIVE ARTERY OF RIGHT LOWER EXTREMITY WITH INTERMITTENT CLAUDICATION (CMS-HCC): ICD-10-CM

## 2025-01-17 ENCOUNTER — CLINICAL SUPPORT (OUTPATIENT)
Dept: CARDIAC REHAB | Facility: CLINIC | Age: 75
End: 2025-01-17
Payer: COMMERCIAL

## 2025-01-17 DIAGNOSIS — I70.211 ATHEROSCLEROSIS OF NATIVE ARTERY OF RIGHT LOWER EXTREMITY WITH INTERMITTENT CLAUDICATION (CMS-HCC): ICD-10-CM

## 2025-01-24 ENCOUNTER — APPOINTMENT (OUTPATIENT)
Dept: CARDIAC REHAB | Facility: CLINIC | Age: 75
End: 2025-01-24
Payer: COMMERCIAL

## 2025-01-27 ENCOUNTER — APPOINTMENT (OUTPATIENT)
Dept: CARDIAC REHAB | Facility: CLINIC | Age: 75
End: 2025-01-27
Payer: COMMERCIAL

## 2025-01-27 DIAGNOSIS — K21.9 ESOPHAGEAL REFLUX: ICD-10-CM

## 2025-01-27 DIAGNOSIS — Z95.820 STATUS POST PERIPHERAL ARTERY ANGIOPLASTY WITH INSERTION OF STENT: ICD-10-CM

## 2025-01-27 PROCEDURE — RXMED WILLOW AMBULATORY MEDICATION CHARGE

## 2025-01-27 RX ORDER — CLOPIDOGREL BISULFATE 75 MG/1
75 TABLET ORAL DAILY
Qty: 90 TABLET | Refills: 0 | Status: SHIPPED | OUTPATIENT
Start: 2025-01-27

## 2025-01-27 RX ORDER — PANTOPRAZOLE SODIUM 40 MG/1
40 TABLET, DELAYED RELEASE ORAL
Qty: 90 TABLET | Refills: 0 | Status: SHIPPED | OUTPATIENT
Start: 2025-01-27

## 2025-01-29 ENCOUNTER — APPOINTMENT (OUTPATIENT)
Dept: CARDIAC REHAB | Facility: CLINIC | Age: 75
End: 2025-01-29
Payer: COMMERCIAL

## 2025-01-31 ENCOUNTER — CLINICAL SUPPORT (OUTPATIENT)
Dept: CARDIAC REHAB | Facility: CLINIC | Age: 75
End: 2025-01-31
Payer: COMMERCIAL

## 2025-01-31 ENCOUNTER — TELEPHONE (OUTPATIENT)
Dept: CARDIAC REHAB | Facility: CLINIC | Age: 75
End: 2025-01-31
Payer: COMMERCIAL

## 2025-01-31 DIAGNOSIS — I70.211 ATHEROSCLEROSIS OF NATIVE ARTERY OF RIGHT LOWER EXTREMITY WITH INTERMITTENT CLAUDICATION (CMS-HCC): ICD-10-CM

## 2025-01-31 NOTE — TELEPHONE ENCOUNTER
1/31/2025 LMOM asking if she was returning to vascular rehab. She is discharged at this time due to lack of attendance. She came to 3 sessions and her last day in rehab was 1/8/25/sd

## 2025-02-03 ENCOUNTER — PHARMACY VISIT (OUTPATIENT)
Dept: PHARMACY | Facility: CLINIC | Age: 75
End: 2025-02-03
Payer: COMMERCIAL

## 2025-02-03 ENCOUNTER — APPOINTMENT (OUTPATIENT)
Dept: CARDIAC REHAB | Facility: CLINIC | Age: 75
End: 2025-02-03
Payer: COMMERCIAL

## 2025-02-05 ENCOUNTER — APPOINTMENT (OUTPATIENT)
Dept: CARDIAC REHAB | Facility: CLINIC | Age: 75
End: 2025-02-05
Payer: COMMERCIAL

## 2025-02-05 NOTE — ASSESSMENT & PLAN NOTE
Continue duloxetine 30mg BID for benefit     Supplement with zofran 4mg before meals    Continue alpha-lipoic acid 600mg 2 capsule TID   No

## 2025-02-07 ENCOUNTER — APPOINTMENT (OUTPATIENT)
Dept: CARDIAC REHAB | Facility: CLINIC | Age: 75
End: 2025-02-07
Payer: COMMERCIAL

## 2025-02-10 ENCOUNTER — APPOINTMENT (OUTPATIENT)
Dept: CARDIAC REHAB | Facility: CLINIC | Age: 75
End: 2025-02-10
Payer: COMMERCIAL

## 2025-02-12 ENCOUNTER — APPOINTMENT (OUTPATIENT)
Dept: CARDIAC REHAB | Facility: CLINIC | Age: 75
End: 2025-02-12
Payer: COMMERCIAL

## 2025-02-14 ENCOUNTER — APPOINTMENT (OUTPATIENT)
Dept: CARDIAC REHAB | Facility: CLINIC | Age: 75
End: 2025-02-14
Payer: COMMERCIAL

## 2025-02-17 ENCOUNTER — APPOINTMENT (OUTPATIENT)
Dept: CARDIAC REHAB | Facility: CLINIC | Age: 75
End: 2025-02-17
Payer: COMMERCIAL

## 2025-02-18 LAB
AP SUMMARY REPORT: NORMAL
SCAN RESULT: NORMAL

## 2025-02-19 ENCOUNTER — APPOINTMENT (OUTPATIENT)
Dept: CARDIAC REHAB | Facility: CLINIC | Age: 75
End: 2025-02-19
Payer: COMMERCIAL

## 2025-02-21 ENCOUNTER — APPOINTMENT (OUTPATIENT)
Dept: CARDIAC REHAB | Facility: CLINIC | Age: 75
End: 2025-02-21
Payer: COMMERCIAL

## 2025-02-24 ENCOUNTER — APPOINTMENT (OUTPATIENT)
Dept: CARDIAC REHAB | Facility: CLINIC | Age: 75
End: 2025-02-24
Payer: COMMERCIAL

## 2025-02-26 ENCOUNTER — APPOINTMENT (OUTPATIENT)
Dept: CARDIAC REHAB | Facility: CLINIC | Age: 75
End: 2025-02-26
Payer: COMMERCIAL

## 2025-02-27 ENCOUNTER — DOCUMENTATION (OUTPATIENT)
Dept: CARDIAC REHAB | Facility: CLINIC | Age: 75
End: 2025-02-27
Payer: COMMERCIAL

## 2025-02-27 NOTE — PROGRESS NOTES
Vascular Rehabilitation Discharge Summary    Name: Serjio Nunes  Medical Record Number: [id number]  YOB: 1950  Age: 74 y.o.    Today’s Date: 2/27/2025  Primary Care Physician: Raymond Carnes MD  Referring Physician: Keyla Vo MD  Program Location: [address]  Primary Diagnosis:   PAD     Onset/Date of Diagnosis: 11/6/2024      AACVPR Risk Stratification: High      Falls Risk: High  Psychosocial Assessment   PRE:6  POST: not done      Sent PH-Q 9 to MD if score > 20: No; score < 20    Pt reported/currently experiencing stress: Yes; Stress; Severity: mild, Anxiety; Severity: mild, and Depression; Severity: mild  Patient uses stress management skills: Yes   History of: anxiety Depression  Currently seeing a mental health provider: No  Social Support: Yes, Whom:family  Quality of Life Survey: PAQ  Learning Assessment:  Learning assessment/barriers: None  Preferred learning method: Auditory and Visual  Barriers: None  Comments:    Stages of Change:Contemplation    Psychosocial Plan    Goal Status: In progress    Psychosocial Goals: Demonstrating proper techniques for stress management, Maintain or lower PH-Q 9 score by discharge, and Identify strategies for managing depression    Psychosocial Interventions/Education unable to complete pt dropped out after 3 sessions    Initial Assessment: in progress    Nutrition Assessment:    Hyperlipidemia: Yes     Lipids:   Lab Results   Component Value Date    CHOL 234 (H) 08/05/2024    HDL 34.6 08/05/2024    LDLF 119 (H) 06/20/2023    TRIG 249 (H) 08/05/2024       Current Dietary Guidelines: Low fat, Low sodium  Barriers to dietary change: no    Diet Habit Survey: Picture Your Plate  Pre: Initial survey given. Pending completion and return from patient.  Post: To be done at discharge.  unable to complete pt dropped out after 3 sessions  Diabetes Assessment    Lab Results   Component Value Date    HGBA1C 6.2 (H) 08/05/2024       History  of Diabetes: Yes Pt monitors BS at home: Yes   Frequency: 1 /day  FBS range: 105 - 120  Hypoglycemic Episodes: Yes     Weight Management       No data recorded    Nutrition Plan    Goal Status: Initial Assessment; goals not yet started    Nutrition Goals: Lipid Goal: HDL>45, LDL <70, Total <180, Trigs <150, Improve Diet Habit Survey score by 5-10 points by discharge, Adapt a low-sodium, DASH diet prior to discharge, Adapt a Mediterranean focused diet prior to discharge, Learn how to read and interpret nutrition labels prior to discharge, and Lose 1lb/week while enrolled in program    Nutrition Interventions/Education:   To be done in Vascular Rehab.  unable to complete pt dropped out after 3 sessions  Initial Assessment: in progress    Exercise Assessment    No  Mode: NA  Frequency: NA  Duration: NA    Exercise Prescription     Exercise Prescription based on: Duke Activity Status Index (DASI)    DASI Score:     MET Score:     Frequency:  3 days/week   Mode: Treadmill, NuStep, and Recumbent Cycle   Duration: 30 total aerobic minutes   Intensity: RPE 12-16  Target HR:  To be calculated after 6 attended sessions.  MET Level: 2.2  Patient wears supplemental O2: No     Modality Workload METs Duration (minutes)   1 Pre-Exercise      2 Treadmill 1.8  2.4 15 :00   3 Rest    5 :00   4 Treadmill 1.8  2.4 15 :00   5 Rest    5 :00   6 Post-Exercise        Resistance Training: No   Home Exercise Prescription given: To be given at session # 6  unable to complete pt dropped out after 3 sessions  Exercise Plan    Goal Status: Initial Assessment; goals not yet started    Exercise Goals: Increase exercise MET level by 5-10% each week, Increase total exercise duration to 30-45 minutes, and Establish a home exercise program before discharge    Exercise Interventions/Education:   To be done in Vascular Rehab.  unable to complete pt dropped out after 3 sessions  Initial Assessment: in progress    Other Core Components/Risk Factor  Assessment:    Medication adherence  Current Medications:   Medication Documentation Review Audit       Reviewed by Hannah Givens RN (Registered Nurse) on 12/26/24 at 0821      Medication Order Taking? Sig Documenting Provider Last Dose Status   albuterol sulfate (Proair Digihaler) 90 mcg/actuation aero powdr breath act w/sensor inhaler [id number] Yes Inhale 2 puffs every 6 hours if needed for wheezing. Historical Provider, MD  Active   amLODIPine (Norvasc) 10 mg tablet [id number] Yes TAKE 1 TABLET BY MOUTH EVERY DAY Raymond Carnes MD  Active   aspirin 81 mg EC tablet [id number] Yes Take 1 tablet (81 mg) by mouth once daily. Historical Provider, MD  Active   atorvastatin (Lipitor) 40 mg tablet [id number] Yes Take 1 tablet (40 mg) by mouth once daily. Maritza Smith MD, MS  Active   busPIRone (Buspar) 5 mg tablet [id number] Yes TAKE 1 TABLET (5 MG) BY MOUTH 2 TIMES A DAY AS NEEDED (WHEN FEELING ANXIOUS). REGINO Pérez-CNP  Active   carvedilol (Coreg) 25 mg tablet [id number] Yes TAKE 1 TABLET BY MOUTH TWICE A DAY Raymond Carnes MD  Active   cholecalciferol (Vitamin D-3) 50 MCG (2000 UT) tablet [id number] Yes Take 1 tablet (2,000 Units) by mouth once daily. Historical Provider, MD  Active   clopidogrel (Plavix) 75 mg tablet [id number] Yes Take 1 tablet (75 mg) by mouth once daily. JOEY Arevalo, DNP  Active   colchicine 0.6 mg tablet [id number] Yes Take 1 tablet (0.6 mg) by mouth once daily. Raymond Carnes MD  Active   empagliflozin (Jardiance) 25 mg [id number] Yes Take 1 tablet (25 mg) by mouth once daily. Raymond Carnes MD  Active   fluocinonide 0.05 % cream [id number] Yes Apply topically 2 times a day. APPLY SPARINGLY TO AFFECTED AREA Historical Provider, MD  Active   fluticasone (Flonase) 50 mcg/actuation nasal spray [id number] Yes Administer 1 spray into each nostril once daily. Historical Provider, MD  Active   fluticasone propion-salmeteroL (Advair Diskus) 250-50 mcg/dose diskus inhaler [id number]  Yes Inhale 1 puff 2 times a day. Historical Provider, MD  Active   FreeStyle Jaycob 14 Day Sensor kit [id number]  APPLY SENSOR TO BACK UPPER ARM REMOVE AND REPLACE EVERY 14 DAYS USE WITH DEVICE Raymond Carnes MD  Active   furosemide (Lasix) 40 mg tablet [id number]  Take 0.5 tablets (20 mg) by mouth once daily. Raymond Carnes MD  Active   ipratropium-albuteroL (Duo-Neb) 0.5-2.5 mg/3 mL nebulizer solution [id number] Yes Take 3 mL by nebulization every 6 hours. Historical Provider, MD  Active   Klor-Con M10 10 mEq ER tablet [id number] Yes TAKE 1 TABLET BY MOUTH EVERY DAY Raymond Carnes MD  Active   lidocaine (LMX) 4 % cream [id number]  Apply topically 3 times a day as needed for mild pain (1 - 3). Raymond Carnes MD  Active   losartan (Cozaar) 100 mg tablet [id number] Yes Take 0.5 tablets (50 mg) by mouth once daily. Raymond Carnes MD  Active   miconazole (Micotin) 2 % cream [id number] Yes miconazole nitrate 2 % topical cream   APPLY SPARINGLY TO AFFECTED AREA TWICE A DAY Historical Provider, MD  Active   nitroglycerin (Nitrostat) 0.4 mg SL tablet [id number] Yes Place 1 tablet (0.4 mg) under the tongue every 5 minutes if needed for chest pain. Brody MOON MD  Active   pantoprazole (ProtoNix) 40 mg EC tablet [id number]  Take 1 tablet (40 mg) by mouth once daily in the morning before meals. Do not crush, chew, or split. Nixon Gamble, APRN-CNP, DNP  Active   tiZANidine (Zanaflex) 4 mg tablet [id number] Yes TAKE 1 TABLET (4 MG) BY MOUTH EVERY 6 HOURS IF NEEDED FOR MUSCLE SPASMS. Raymond Carnes MD  Active   Tradjenta 5 mg tablet [id number] Yes TAKE 1 TABLET BY MOUTH EVERY DAY AS DIRECTED Raymond Carnes MD  Active                                 Medication compliance: Yes   Uses pill box/organizer: Yes    Carries medication list: No     Blood Pressure Management  History of Hypertension: Yes   Medication Changes: No   Resting BP:  There were no vitals taken for this visit.       Heart Failure Management  Hx of Heart  Failure: No    Smoking/Tobacco Assessment  Social History     Tobacco Use   Smoking Status Every Day    Current packs/day: 0.00    Average packs/day: 0.5 packs/day for 48.0 years (24.0 ttl pk-yrs)    Types: Cigarettes    Start date:     Last attempt to quit: 2013    Years since quittin.1   Smokeless Tobacco Never       Other Core Component Plan    Goal Status: Initial Assessment; goals not yet started    Other Core Component Goals: Medication compliance, Verbalize medication usage and drug actions by discharge, Verbalize SL NTG action and proper dosage by discharge, Set tobacco cessation quit date while enrolled, and Achieve resting BP of < 130/80 by discharge    Other Core Component Interventions/Education:   Medication compliance and Begin tobacco cessation program    Initial Assessment: in progress  unable to complete pt dropped out after 3 sessions  Individual Patient Goals:    Be able to walk for for 30 min without stopping by discharge  Be able to walk with her daughter while walking the dogs 1-2 miles by D/c    Goal Status: In progress    Staff Comments:  Ms Nunes attended 3 sessions of vascular rehab.We were unable to progress due to lack of attendance.  Rehab Staff Signature: Hannah Givens RN

## 2025-02-28 ENCOUNTER — APPOINTMENT (OUTPATIENT)
Dept: CARDIAC REHAB | Facility: CLINIC | Age: 75
End: 2025-02-28
Payer: COMMERCIAL

## 2025-03-03 ENCOUNTER — APPOINTMENT (OUTPATIENT)
Dept: CARDIAC REHAB | Facility: CLINIC | Age: 75
End: 2025-03-03
Payer: COMMERCIAL

## 2025-03-05 ENCOUNTER — APPOINTMENT (OUTPATIENT)
Dept: CARDIAC REHAB | Facility: CLINIC | Age: 75
End: 2025-03-05
Payer: COMMERCIAL

## 2025-03-06 NOTE — PROGRESS NOTES
Bettie Stern is a 15 year old female who presents for their annual WCC. Bettie is accompanied by mother. Bettie Subjective   Chief complaint: Serjio Nunes is a 73 y.o. female who presents for Follow-up (Pt is here for blood work follow up, pt c/o neck and left shoulder pain, and bilateral knee pain ).    HPI:  Follow-up (Pt is here for blood work follow up, pt c/o neck and left shoulder pain, and bilateral knee pain )        Objective   /78   Pulse 73   Resp 12   Wt 87.5 kg (193 lb)   SpO2 94%   BMI 33.13 kg/m²   Physical Exam  Vitals reviewed.   Constitutional:       Appearance: Normal appearance.   HENT:      Head: Normocephalic and atraumatic.   Cardiovascular:      Rate and Rhythm: Normal rate and regular rhythm.   Pulmonary:      Effort: Pulmonary effort is normal.      Breath sounds: Normal breath sounds.   Abdominal:      General: Bowel sounds are normal.      Palpations: Abdomen is soft.   Musculoskeletal:      Cervical back: Neck supple.      Comments: Limited range of motion of the left shoulder in all direction   Skin:     General: Skin is warm and dry.   Neurological:      General: No focal deficit present.      Mental Status: She is alert.   Psychiatric:         Mood and Affect: Mood normal.         Behavior: Behavior is cooperative.         I have reviewed and reconciled the medication list with the patient today.   Current Outpatient Medications:     albuterol 90 mcg/actuation inhaler, Inhale 1 puff 3 times a day., Disp: 18 g, Rfl: 2    amLODIPine (Norvasc) 10 mg tablet, TAKE 1 TABLET BY MOUTH EVERY DAY, Disp: 90 tablet, Rfl: 3    aspirin 81 mg EC tablet, Take 1 tablet (81 mg) by mouth once daily., Disp: , Rfl:     busPIRone (Buspar) 5 mg tablet, TAKE 1 TABLET (5 MG) BY MOUTH 2 TIMES A DAY AS NEEDED (WHEN FEELING ANXIOUS)., Disp: 180 tablet, Rfl: 3    carvedilol (Coreg) 25 mg tablet, TAKE 1 TABLET BY MOUTH TWICE A DAY, Disp: 180 tablet, Rfl: 3    cholecalciferol (Vitamin D-3) 50 MCG (2000 UT) tablet, Take 1 tablet (2,000 Units) by mouth once daily., Disp: , Rfl:     clotrimazole-betamethasone  (Lotrisone) cream, APPLY THIN COAT TO AFFECTED AREA TWICE A DAY IN THE MORNING AND IN THE EVENING, Disp: 45 g, Rfl: 3    colchicine, gout, 0.6 mg tablet, TAKE 1 TABLET BY MOUTH EVERY DAY, Disp: 90 tablet, Rfl: 1    fluocinonide 0.05 % cream, Apply topically 2 times a day. APPLY SPARINGLY TO AFFECTED AREA, Disp: , Rfl:     fluticasone (Flonase) 50 mcg/actuation nasal spray, Administer 1 spray into each nostril once daily., Disp: , Rfl:     fluticasone propion-salmeteroL (Advair Diskus) 250-50 mcg/dose diskus inhaler, Inhale 1 puff twice a day., Disp: , Rfl:     FreeStyle Jaycob 14 Day Sensor kit, APPLY SENSOR TO BACK UPPER ARM REMOVE AND REPLACE EVERY 14 DAYS USE WITH DEVICE, Disp: 200 each, Rfl: 3    furosemide (Lasix) 40 mg tablet, Take 1 tablet (40 mg) by mouth once daily., Disp: , Rfl:     guaiFENesin (Mucinex) 600 mg 12 hr tablet, Take 2 tablets (1,200 mg) by mouth 2 times a day. Do not crush, chew, or split., Disp: 120 tablet, Rfl: 11    ipratropium-albuteroL (Duo-Neb) 0.5-2.5 mg/3 mL nebulizer solution, Take 3 mL by nebulization 3 times a day., Disp: 180 mL, Rfl: 2    Klor-Con M10 10 mEq ER tablet, TAKE 1 TABLET BY MOUTH EVERY DAY, Disp: 90 tablet, Rfl: 3    losartan (Cozaar) 100 mg tablet, TAKE 1 TABLET BY MOUTH EVERY DAY, Disp: 90 tablet, Rfl: 3    miconazole (Micotin) 2 % cream, miconazole nitrate 2 % topical cream  APPLY SPARINGLY TO AFFECTED AREA TWICE A DAY, Disp: , Rfl:     nitroglycerin (Nitrostat) 0.4 mg SL tablet, Place 1 tablet (0.4 mg) under the tongue every 5 minutes if needed for chest pain., Disp: , Rfl:     tiZANidine (Zanaflex) 4 mg tablet, TAKE 1 TABLET (4 MG) BY MOUTH EVERY 6 HOURS IF NEEDED FOR MUSCLE SPASMS., Disp: 90 tablet, Rfl: 1    Tradjenta 5 mg tablet, TAKE 1 TABLET BY MOUTH EVERY DAY AS DIRECTED, Disp: 90 tablet, Rfl: 1     Imaging:  No results found.     Labs reviewed:    Lab Results   Component Value Date    WBC 11.5 (H) 12/18/2023    HGB 14.0 12/18/2023    HCT 45.5 12/18/2023     12/18/2023    CHOL 254 (H) 12/18/2023    TRIG 143 12/18/2023    HDL 62.6 12/18/2023    ALT 13 12/18/2023    AST 13 12/18/2023     12/18/2023    K 3.8 12/18/2023     12/18/2023    CREATININE 1.07 (H) 12/18/2023    BUN 10 12/18/2023    CO2 26 12/18/2023    TSH 1.08 06/20/2023    INR 1.0 07/19/2022    HGBA1C 5.8 (H) 12/18/2023       Assessment/Plan   Problem List Items Addressed This Visit       Degeneration of intervertebral disc at L5-S1 level    Cervical pain - Primary     Zanaflex         Controlled diabetes mellitus (CMS/HCC)    Chronic obstructive lung disease (CMS/HCC)    Chronic left shoulder pain     I injected left shoulder with Kenalog 40 mg and 1 cc lidocaine  Most likely patient has a rotator cuff tendinitis.            Continue current medications as listed  Follow up in 3 months      Daly Paniagua, CNP

## 2025-03-07 ENCOUNTER — APPOINTMENT (OUTPATIENT)
Dept: CARDIAC REHAB | Facility: CLINIC | Age: 75
End: 2025-03-07
Payer: COMMERCIAL

## 2025-03-10 ENCOUNTER — APPOINTMENT (OUTPATIENT)
Dept: CARDIAC REHAB | Facility: CLINIC | Age: 75
End: 2025-03-10
Payer: COMMERCIAL

## 2025-03-12 ENCOUNTER — APPOINTMENT (OUTPATIENT)
Dept: CARDIAC REHAB | Facility: CLINIC | Age: 75
End: 2025-03-12
Payer: COMMERCIAL

## 2025-03-14 ENCOUNTER — APPOINTMENT (OUTPATIENT)
Dept: CARDIAC REHAB | Facility: CLINIC | Age: 75
End: 2025-03-14
Payer: COMMERCIAL

## 2025-03-17 ENCOUNTER — APPOINTMENT (OUTPATIENT)
Dept: CARDIAC REHAB | Facility: CLINIC | Age: 75
End: 2025-03-17
Payer: COMMERCIAL

## 2025-03-19 ENCOUNTER — APPOINTMENT (OUTPATIENT)
Dept: CARDIAC REHAB | Facility: CLINIC | Age: 75
End: 2025-03-19
Payer: COMMERCIAL

## 2025-03-21 ENCOUNTER — APPOINTMENT (OUTPATIENT)
Dept: CARDIAC REHAB | Facility: CLINIC | Age: 75
End: 2025-03-21
Payer: COMMERCIAL

## 2025-03-24 ENCOUNTER — APPOINTMENT (OUTPATIENT)
Dept: CARDIAC REHAB | Facility: CLINIC | Age: 75
End: 2025-03-24
Payer: COMMERCIAL

## 2025-03-26 ENCOUNTER — APPOINTMENT (OUTPATIENT)
Dept: CARDIAC REHAB | Facility: CLINIC | Age: 75
End: 2025-03-26
Payer: COMMERCIAL

## 2025-03-28 ENCOUNTER — APPOINTMENT (OUTPATIENT)
Dept: CARDIAC REHAB | Facility: CLINIC | Age: 75
End: 2025-03-28
Payer: COMMERCIAL

## 2025-03-28 LAB
ALBUMIN SERPL-MCNC: 4 G/DL (ref 3.6–5.1)
ALP SERPL-CCNC: 121 U/L (ref 37–153)
ALT SERPL-CCNC: 12 U/L (ref 6–29)
ANION GAP SERPL CALCULATED.4IONS-SCNC: 9 MMOL/L (CALC) (ref 7–17)
AST SERPL-CCNC: 13 U/L (ref 10–35)
BILIRUB SERPL-MCNC: 0.5 MG/DL (ref 0.2–1.2)
BUN SERPL-MCNC: 11 MG/DL (ref 7–25)
CALCIUM SERPL-MCNC: 9.5 MG/DL (ref 8.6–10.4)
CHLORIDE SERPL-SCNC: 102 MMOL/L (ref 98–110)
CHOLEST SERPL-MCNC: 143 MG/DL
CHOLEST/HDLC SERPL: 2.2 (CALC)
CO2 SERPL-SCNC: 29 MMOL/L (ref 20–32)
CREAT SERPL-MCNC: 1.15 MG/DL (ref 0.6–1)
EGFRCR SERPLBLD CKD-EPI 2021: 51 ML/MIN/1.73M2
ERYTHROCYTE [DISTWIDTH] IN BLOOD BY AUTOMATED COUNT: 14.2 % (ref 11–15)
GLUCOSE SERPL-MCNC: 146 MG/DL (ref 65–99)
HBA1C MFR BLD: 6.7 % OF TOTAL HGB
HCT VFR BLD AUTO: 41 % (ref 35–45)
HDLC SERPL-MCNC: 65 MG/DL
HGB BLD-MCNC: 13.3 G/DL (ref 11.7–15.5)
LDLC SERPL CALC-MCNC: 58 MG/DL (CALC)
MCH RBC QN AUTO: 29 PG (ref 27–33)
MCHC RBC AUTO-ENTMCNC: 32.4 G/DL (ref 32–36)
MCV RBC AUTO: 89.3 FL (ref 80–100)
NONHDLC SERPL-MCNC: 78 MG/DL (CALC)
PLATELET # BLD AUTO: 338 THOUSAND/UL (ref 140–400)
PMV BLD REES-ECKER: 9.9 FL (ref 7.5–12.5)
POTASSIUM SERPL-SCNC: 4.1 MMOL/L (ref 3.5–5.3)
PROT SERPL-MCNC: 7.1 G/DL (ref 6.1–8.1)
RBC # BLD AUTO: 4.59 MILLION/UL (ref 3.8–5.1)
SODIUM SERPL-SCNC: 140 MMOL/L (ref 135–146)
TRIGL SERPL-MCNC: 114 MG/DL
WBC # BLD AUTO: 13.6 THOUSAND/UL (ref 3.8–10.8)

## 2025-04-14 DIAGNOSIS — K21.9 ESOPHAGEAL REFLUX: ICD-10-CM

## 2025-04-14 DIAGNOSIS — Z95.820 STATUS POST PERIPHERAL ARTERY ANGIOPLASTY WITH INSERTION OF STENT: ICD-10-CM

## 2025-04-15 PROCEDURE — RXMED WILLOW AMBULATORY MEDICATION CHARGE

## 2025-04-15 RX ORDER — PANTOPRAZOLE SODIUM 40 MG/1
40 TABLET, DELAYED RELEASE ORAL
Qty: 90 TABLET | Refills: 0 | Status: SHIPPED | OUTPATIENT
Start: 2025-04-15

## 2025-04-15 RX ORDER — CLOPIDOGREL BISULFATE 75 MG/1
75 TABLET ORAL DAILY
Qty: 90 TABLET | Refills: 0 | Status: SHIPPED | OUTPATIENT
Start: 2025-04-15

## 2025-04-17 DIAGNOSIS — I73.9 PAD (PERIPHERAL ARTERY DISEASE) (CMS-HCC): ICD-10-CM

## 2025-04-22 ENCOUNTER — ANCILLARY PROCEDURE (OUTPATIENT)
Facility: CLINIC | Age: 75
End: 2025-04-22
Payer: COMMERCIAL

## 2025-04-22 DIAGNOSIS — I73.9 PAD (PERIPHERAL ARTERY DISEASE) (CMS-HCC): ICD-10-CM

## 2025-04-22 DIAGNOSIS — I10 PRIMARY HYPERTENSION: ICD-10-CM

## 2025-04-22 DIAGNOSIS — R06.09 DOE (DYSPNEA ON EXERTION): ICD-10-CM

## 2025-04-22 DIAGNOSIS — I87.2 CHRONIC VENOUS INSUFFICIENCY: ICD-10-CM

## 2025-04-22 DIAGNOSIS — E78.00 PURE HYPERCHOLESTEROLEMIA: ICD-10-CM

## 2025-04-22 DIAGNOSIS — I50.32 CHRONIC DIASTOLIC (CONGESTIVE) HEART FAILURE: ICD-10-CM

## 2025-04-22 DIAGNOSIS — I11.0 HYPERTENSIVE HEART DISEASE WITH HEART FAILURE: ICD-10-CM

## 2025-04-22 DIAGNOSIS — I50.33 ACUTE ON CHRONIC DIASTOLIC CONGESTIVE HEART FAILURE: ICD-10-CM

## 2025-04-22 DIAGNOSIS — R07.89 ATYPICAL CHEST PAIN: ICD-10-CM

## 2025-04-22 DIAGNOSIS — I35.1 NONRHEUMATIC AORTIC VALVE INSUFFICIENCY: ICD-10-CM

## 2025-04-22 PROCEDURE — 93306 TTE W/DOPPLER COMPLETE: CPT | Performed by: STUDENT IN AN ORGANIZED HEALTH CARE EDUCATION/TRAINING PROGRAM

## 2025-04-22 PROCEDURE — 93306 TTE W/DOPPLER COMPLETE: CPT

## 2025-04-23 LAB
AORTIC VALVE PEAK VELOCITY: 1.21 M/S
AV PEAK GRADIENT: 6 MMHG
AVA (PEAK VEL): 3.16 CM2
EJECTION FRACTION APICAL 4 CHAMBER: 55.4
EJECTION FRACTION: 56 %
LEFT ATRIUM VOLUME AREA LENGTH INDEX BSA: 21 ML/M2
LEFT VENTRICLE INTERNAL DIMENSION DIASTOLE: 4.76 CM (ref 3.5–6)
LEFT VENTRICULAR OUTFLOW TRACT DIAMETER: 2.18 CM
MITRAL VALVE E/A RATIO: 0.61
RIGHT VENTRICLE FREE WALL PEAK S': 11 CM/S
TRICUSPID ANNULAR PLANE SYSTOLIC EXCURSION: 1.8 CM

## 2025-04-28 ENCOUNTER — PHARMACY VISIT (OUTPATIENT)
Dept: PHARMACY | Facility: CLINIC | Age: 75
End: 2025-04-28
Payer: COMMERCIAL

## 2025-04-30 RX ORDER — ATORVASTATIN CALCIUM 40 MG/1
40 TABLET, FILM COATED ORAL DAILY
Qty: 90 TABLET | Refills: 3 | Status: SHIPPED | OUTPATIENT
Start: 2025-04-30

## 2025-05-01 DIAGNOSIS — I10 HYPERTENSION, UNSPECIFIED TYPE: ICD-10-CM

## 2025-05-01 RX ORDER — CARVEDILOL 25 MG/1
25 TABLET ORAL 2 TIMES DAILY
Qty: 180 TABLET | Refills: 3 | Status: SHIPPED | OUTPATIENT
Start: 2025-05-01

## 2025-05-23 ENCOUNTER — OFFICE VISIT (OUTPATIENT)
Dept: CARDIOLOGY | Facility: CLINIC | Age: 75
End: 2025-05-23
Payer: COMMERCIAL

## 2025-05-23 VITALS
DIASTOLIC BLOOD PRESSURE: 70 MMHG | HEIGHT: 63 IN | SYSTOLIC BLOOD PRESSURE: 150 MMHG | HEART RATE: 80 BPM | WEIGHT: 154 LBS | BODY MASS INDEX: 27.29 KG/M2 | OXYGEN SATURATION: 98 %

## 2025-05-23 DIAGNOSIS — Z95.820 STATUS POST PERIPHERAL ARTERY ANGIOPLASTY WITH INSERTION OF STENT: ICD-10-CM

## 2025-05-23 DIAGNOSIS — I87.2 CHRONIC VENOUS INSUFFICIENCY: ICD-10-CM

## 2025-05-23 DIAGNOSIS — I35.1 NONRHEUMATIC AORTIC VALVE INSUFFICIENCY: ICD-10-CM

## 2025-05-23 DIAGNOSIS — R06.09 DOE (DYSPNEA ON EXERTION): Primary | ICD-10-CM

## 2025-05-23 DIAGNOSIS — I50.32 CHRONIC DIASTOLIC (CONGESTIVE) HEART FAILURE: ICD-10-CM

## 2025-05-23 DIAGNOSIS — I11.0 HYPERTENSIVE HEART DISEASE WITH HEART FAILURE: ICD-10-CM

## 2025-05-23 DIAGNOSIS — E11.42 DIABETIC PERIPHERAL NEUROPATHY ASSOCIATED WITH TYPE 2 DIABETES MELLITUS: ICD-10-CM

## 2025-05-23 DIAGNOSIS — I73.9 PAD (PERIPHERAL ARTERY DISEASE): ICD-10-CM

## 2025-05-23 DIAGNOSIS — E78.00 PURE HYPERCHOLESTEROLEMIA: ICD-10-CM

## 2025-05-23 DIAGNOSIS — N18.32 CHRONIC KIDNEY DISEASE, STAGE 3B (MULTI): ICD-10-CM

## 2025-05-23 DIAGNOSIS — R07.89 ATYPICAL CHEST PAIN: ICD-10-CM

## 2025-05-23 DIAGNOSIS — I70.221 ATHEROSCLEROSIS OF NATIVE ARTERIES OF EXTREMITIES WITH REST PAIN, RIGHT LEG: ICD-10-CM

## 2025-05-23 DIAGNOSIS — J44.1 ACUTE EXACERBATION OF CHRONIC OBSTRUCTIVE PULMONARY DISEASE (MULTI): ICD-10-CM

## 2025-05-23 DIAGNOSIS — I10 PRIMARY HYPERTENSION: ICD-10-CM

## 2025-05-23 DIAGNOSIS — E11.65 TYPE 2 DIABETES MELLITUS WITH HYPERGLYCEMIA, WITHOUT LONG-TERM CURRENT USE OF INSULIN: ICD-10-CM

## 2025-05-23 DIAGNOSIS — E08.00 DIABETES MELLITUS DUE TO UNDERLYING CONDITION WITH HYPEROSMOLARITY WITHOUT COMA, WITHOUT LONG-TERM CURRENT USE OF INSULIN: ICD-10-CM

## 2025-05-23 DIAGNOSIS — I50.33 ACUTE ON CHRONIC DIASTOLIC CONGESTIVE HEART FAILURE: ICD-10-CM

## 2025-05-23 PROCEDURE — 1159F MED LIST DOCD IN RCRD: CPT | Performed by: STUDENT IN AN ORGANIZED HEALTH CARE EDUCATION/TRAINING PROGRAM

## 2025-05-23 PROCEDURE — 1036F TOBACCO NON-USER: CPT | Performed by: STUDENT IN AN ORGANIZED HEALTH CARE EDUCATION/TRAINING PROGRAM

## 2025-05-23 PROCEDURE — G2211 COMPLEX E/M VISIT ADD ON: HCPCS | Performed by: STUDENT IN AN ORGANIZED HEALTH CARE EDUCATION/TRAINING PROGRAM

## 2025-05-23 PROCEDURE — 3008F BODY MASS INDEX DOCD: CPT | Performed by: STUDENT IN AN ORGANIZED HEALTH CARE EDUCATION/TRAINING PROGRAM

## 2025-05-23 PROCEDURE — 99214 OFFICE O/P EST MOD 30 MIN: CPT | Performed by: STUDENT IN AN ORGANIZED HEALTH CARE EDUCATION/TRAINING PROGRAM

## 2025-05-23 PROCEDURE — 3077F SYST BP >= 140 MM HG: CPT | Performed by: STUDENT IN AN ORGANIZED HEALTH CARE EDUCATION/TRAINING PROGRAM

## 2025-05-23 PROCEDURE — 4010F ACE/ARB THERAPY RXD/TAKEN: CPT | Performed by: STUDENT IN AN ORGANIZED HEALTH CARE EDUCATION/TRAINING PROGRAM

## 2025-05-23 PROCEDURE — 3078F DIAST BP <80 MM HG: CPT | Performed by: STUDENT IN AN ORGANIZED HEALTH CARE EDUCATION/TRAINING PROGRAM

## 2025-05-23 RX ORDER — TRAZODONE HYDROCHLORIDE 100 MG/1
200 TABLET ORAL NIGHTLY PRN
COMMUNITY
Start: 2025-04-01

## 2025-05-23 ASSESSMENT — ENCOUNTER SYMPTOMS: OCCASIONAL FEELINGS OF UNSTEADINESS: 0

## 2025-05-23 NOTE — PROGRESS NOTES
Follow-up     HPI:    Serjio Nunes is a 74 y.o. female with pertinent history of hypertension, dyslipidemia, obesity, obstructive sleep apnea, history of DVT, history of chronic venous insufficiency status post vein ablation in the remote past and h/o left breast cancer s/p lumpectomy, chemotherapy and radiation, peripheral arterial disease, acute on chronic diastolic heart failure with EDP 25 mmHg on LHC with non-obstructive CAD on 4/8/16, preserved ejection fraction with impaired relaxation diastolic dysfunction and mild to moderate aortic regurgitation an echo performed 1/8/2021, preserved ejection fraction with impaired relaxation and moderate aortic regurgitation on echo performed 4/14/2023, no clear ischemia nuclear pharmacologic stress test performed 1/12/2024, moderate aortic regurgitation with regurgitant fraction of 30%, regurgitant volume of 20%, normal LVEF of 68%, RVEF of 60%, no infiltrative or scarring, lung nodule on cardiac MRI performed 3/7/2024, severe right lower extremity rest pain and claudication status post successful IVUS-guided revascularization of proximal right external iliac artery using 9.0 x 40 mm self-expanding stent and PTA-DCB angioplasty of mid-distal right SFA-distal popliteal  with Dr. Vo 11/6/2024, preserved LVEF of 56% with grade 1 diastolic dysfunction, mild to moderate aortic regurgitation echocardiogram performed 4/22/2025 presents for follow-up.    She is doing relatively well.  Claudication symptoms have significantly improved since undergoing revascularization in November.  We discussed the natural history of peripheral arterial disease.  We also reviewed and discussed her recent echocardiogram.  No significant chest pain episodes.  Her blood pressure is a bit elevated in clinic today but she states is normally well-controlled.  Dyspnea on exertion is relatively stable.  No exacerbating or relieving factors.  Pt denies orthopnea, and paroxysmal nocturnal  dyspnea.  Pt denies worsening lower extremity edema.  Pt denies palpitations or syncope.  No recent falls.  No fever or chills.  No cough.  No change in bowel or bladder habits.  No sick contacts.  No recent travel.    12 point review of systems including (Constitutional, Eyes, ENMT, Respiratory, Cardiac, Gastrointestinal, Neurological, Psychiatric, and Hematologic) was performed and is otherwise negative.    Past medical history reviewed:   has a past medical history of Anemia, Anxiety, Asthma, Bipolar disorder, Breast cancer (Multi) (2013), Cardiology follow-up encounter (03/19/2024), CHF (congestive heart failure), Chronic venous insufficiency, COPD (chronic obstructive pulmonary disease) (Multi), Coronary artery disease, Diverticulosis, DVT (deep venous thrombosis) (Multi) (1972), Fibromyalgia, GERD (gastroesophageal reflux disease), Gout, Hyperlipidemia, Hypertension, Insomnia, Lumbar radiculopathy, Lung nodule, Nonrheumatic aortic (valve) insufficiency, Osteoarthritis, RLS (restless legs syndrome), Sleep apnea, TIA (transient ischemic attack) (2015), Type 2 diabetes mellitus, and Vitamin D deficiency.    Past surgical history reviewed:   has a past surgical history that includes Hernia repair; Breast lumpectomy (Left, 2013); Breast biopsy; Venous ablation; Cardiac catheterization (04/08/2016); Soft Tissue Biopsy (08/26/2024); Hysterectomy; Cervical fusion; Bunionectomy (Bilateral); Foot surgery (Right); Rotator cuff repair (Left); Lung removal, partial (Right, 09/12/2024); and Invasive Vascular Procedure (Right, 11/6/2024).    Social history reviewed:   reports that she has been smoking cigarettes. She started smoking about 60 years ago. She has a 24 pack-year smoking history. She has never used smokeless tobacco. She reports that she does not currently use alcohol. She reports current drug use. Drug: Marijuana.     Family history reviewed:    Family History   Problem Relation Name Age of Onset    Cancer  Mother          COLORECTAL    Aneurysm Mother      Stroke Mother      Arthritis Father      Hypertension Father      Aneurysm Father         Allergies reviewed: Iodinated contrast media, Strawberry, Iodine, and Latex     Medications reviewed:   Current Outpatient Medications   Medication Instructions    albuterol sulfate (Proair Digihaler) 90 mcg/actuation aero powdr breath act w/sensor inhaler 2 puffs, Every 6 hours PRN    amLODIPine (Norvasc) 10 mg tablet TAKE 1 TABLET BY MOUTH EVERY DAY    aspirin 81 mg EC tablet 1 tablet, Daily    atorvastatin (LIPITOR) 40 mg, oral, Daily    busPIRone (BUSPAR) 5 mg, oral, 2 times daily PRN    carvedilol (COREG) 25 mg, oral, 2 times daily    cholecalciferol (VITAMIN D-3) 2,000 Units, Daily RT    clopidogrel (PLAVIX) 75 mg, oral, Daily    colchicine 0.6 mg, oral, Daily    empagliflozin (JARDIANCE) 25 mg, oral, Daily    fluocinonide 0.05 % cream 2 times daily    fluticasone (Flonase) 50 mcg/actuation nasal spray 1 spray, Daily    fluticasone propion-salmeteroL (Advair Diskus) 250-50 mcg/dose diskus inhaler 1 puff, 2 times daily RT    FreeStyle Jaycob 14 Day Sensor kit APPLY SENSOR TO BACK UPPER ARM REMOVE AND REPLACE EVERY 14 DAYS USE WITH DEVICE    furosemide (LASIX) 20 mg, oral, Daily    ipratropium-albuteroL (Duo-Neb) 0.5-2.5 mg/3 mL nebulizer solution 3 mL, Every 6 hours RT    Klor-Con M10 10 mEq ER tablet 10 mEq, oral, Daily    lidocaine (LMX) 4 % cream Topical, 3 times daily PRN    losartan (COZAAR) 50 mg, oral, Daily    miconazole (Micotin) 2 % cream miconazole nitrate 2 % topical cream   APPLY SPARINGLY TO AFFECTED AREA TWICE A DAY    nitroglycerin (NITROSTAT) 0.4 mg, sublingual, Every 5 min PRN    pantoprazole (ProtoNix) 40 mg EC tablet Take 1 tablet (40 mg) by mouth once daily in the morning before meals. Do not crush, chew, or split.    tiZANidine (ZANAFLEX) 4 mg, oral, Every 6 hours PRN    Tradjenta 5 mg, oral, Daily, as directed        Vitals reviewed: Visit Vitals  BP  150/70   Pulse 80         Physical Exam:   General:  Patient is awake, alert, and oriented.  Patient is in no acute distress.  HEENT:  Pupils equal and reactive.  Normocephalic.  Moist mucosa.    Neck:  No thyromegaly.  Normal Jugular Venous Pressure.  Cardiovascular:  Regular rate and rhythm.  Normal S1 and S2.  2/6 MARY.  Grade 2 diastolic murmur.  Pulmonary:  Clear to auscultation bilaterally.  Abdomen:  Soft. Non-tender.   Non-distended.  Positive bowel sounds.  Lower Extremities:  2+ pedal pulses. No LE edema.  Neurologic:  Cranial nerves intact.  No focal deficit.   Skin: Skin warm and dry, normal skin turgor.   Psychiatric: Normal affect.    Last Labs:  CBC -      Lab Results   Component Value Date    WBC 13.6 (H) 03/27/2025    HGB 13.3 03/27/2025    HCT 41.0 03/27/2025     03/27/2025        CMP-  Lab Results   Component Value Date    GLUCOSE 146 (H) 03/27/2025     03/27/2025    K 4.1 03/27/2025     03/27/2025    CO2 29 03/27/2025    ANIONGAP 9 03/27/2025    BUN 11 03/27/2025    CREATININE 1.15 (H) 03/27/2025    EGFR 51 (L) 03/27/2025    CALCIUM 9.5 03/27/2025    PHOS 2.8 03/18/2024    PROT 7.1 03/27/2025    ALBUMIN 4.0 03/27/2025    AST 13 03/27/2025    ALT 12 03/27/2025    ALKPHOS 121 03/27/2025    BILITOT 0.5 03/27/2025        LIPIDS-  Lab Results   Component Value Date    CHOL 143 03/27/2025    TRIG 114 03/27/2025    HDL 65 03/27/2025    CHHDL 2.2 03/27/2025    VLDL 50 (H) 08/05/2024        OTHERS-  Lab Results   Component Value Date    HGBA1C 6.7 (H) 03/27/2025    BNP 31 03/18/2024        I personally reviewed the patient's recent vitals, labs, medications, orders, EKGs, pertinent cardiac imaging/ echocardiography and ischemic evaluations including stress testing/ cardiac catheterization.    Assessment and Plan:    Charlean E Nunes is a 74 y.o. female with pertinent history of hypertension, dyslipidemia, obesity, obstructive sleep apnea, history of DVT, history of chronic venous  insufficiency status post vein ablation in the remote past and h/o left breast cancer s/p lumpectomy, chemotherapy and radiation, peripheral arterial disease, acute on chronic diastolic heart failure with EDP 25 mmHg on C with non-obstructive CAD on 4/8/16, preserved ejection fraction with impaired relaxation diastolic dysfunction and mild to moderate aortic regurgitation an echo performed 1/8/2021, preserved ejection fraction with impaired relaxation and moderate aortic regurgitation on echo performed 4/14/2023, no clear ischemia nuclear pharmacologic stress test performed 1/12/2024, moderate aortic regurgitation with regurgitant fraction of 30%, regurgitant volume of 20%, normal LVEF of 68%, RVEF of 60%, no infiltrative or scarring, lung nodule on cardiac MRI performed 3/7/2024, severe right lower extremity rest pain and claudication status post successful IVUS-guided revascularization of proximal right external iliac artery using 9.0 x 40 mm self-expanding stent and PTA-DCB angioplasty of mid-distal right SFA-distal popliteal  with Dr. Vo 11/6/2024, preserved LVEF of 56% with grade 1 diastolic dysfunction, mild to moderate aortic regurgitation echocardiogram performed 4/22/2025 presents for follow-up.  She is doing relatively well.  Claudication symptoms have significantly improved since undergoing revascularization in November.  We discussed the natural history of peripheral arterial disease.  We also reviewed and discussed her recent echocardiogram.  No significant chest pain episodes.  Her blood pressure is a bit elevated in clinic today but she states is normally well-controlled.  Dyspnea on exertion is relatively stable.      Please continue current cardiac medications including amlodipine 10 mg daily, aspirin 81 mg daily, carvedilol 25 mg twice daily, furosemide 20 mg daily, losartan 50 mg daily, sublingual nitroglycerin as needed for chest pain.    I usually recommend checking your blood pressure  for one month before coming to see me or your primary care physician about 4-5 times a week for one month.  Please take some of these readings in the morning and some in the afternoon or evening.  Please create a log of date, time, and blood pressure on a piece of paper or notebook that we can review at your next visit.     Please followup with me in Cardiology clinic within the next 10 months.  Please return to clinic sooner or seek emergent care if your symptoms reoccur or worsen.    Thank you for allowing me to participate in their care.  Please feel free to call me with any further questions or concerns.      Brody Cantu MD, FACC, [facility]  Division of Cardiovascular Medicine  System Director, Nuclear Cardiology   Medical Director, [facility] [place], [facility]   Clinical , [facility] [employer]  [email or web address]   Office:  [phone]

## 2025-05-23 NOTE — PATIENT INSTRUCTIONS
Please continue current cardiac medications including amlodipine 10 mg daily, aspirin 81 mg daily, carvedilol 25 mg twice daily, furosemide 20 mg daily, losartan 50 mg daily, sublingual nitroglycerin as needed for chest pain.    I usually recommend checking your blood pressure for one month before coming to see me or your primary care physician about 4-5 times a week for one month.  Please take some of these readings in the morning and some in the afternoon or evening.  Please create a log of date, time, and blood pressure on a piece of paper or notebook that we can review at your next visit.     Please followup with me in Cardiology clinic within the next 10 months.  Please return to clinic sooner or seek emergent care if your symptoms reoccur or worsen.

## 2025-07-21 DIAGNOSIS — Z95.820 STATUS POST PERIPHERAL ARTERY ANGIOPLASTY WITH INSERTION OF STENT: ICD-10-CM

## 2025-07-21 RX ORDER — CLOPIDOGREL BISULFATE 75 MG/1
75 TABLET ORAL DAILY
Qty: 90 TABLET | Refills: 0 | Status: SHIPPED | OUTPATIENT
Start: 2025-07-21

## 2025-07-22 PROCEDURE — RXMED WILLOW AMBULATORY MEDICATION CHARGE

## 2025-07-25 ENCOUNTER — PHARMACY VISIT (OUTPATIENT)
Dept: PHARMACY | Facility: CLINIC | Age: 75
End: 2025-07-25
Payer: COMMERCIAL

## 2025-08-04 ENCOUNTER — HOSPITAL ENCOUNTER (OUTPATIENT)
Dept: RADIOLOGY | Facility: HOSPITAL | Age: 75
Discharge: HOME | End: 2025-08-04
Payer: COMMERCIAL

## 2025-08-04 ENCOUNTER — APPOINTMENT (OUTPATIENT)
Dept: RADIOLOGY | Facility: HOSPITAL | Age: 75
End: 2025-08-04
Payer: COMMERCIAL

## 2025-08-04 DIAGNOSIS — E07.9 THYROID MASS: ICD-10-CM

## 2025-08-04 DIAGNOSIS — K21.9 ESOPHAGEAL REFLUX: ICD-10-CM

## 2025-08-04 PROCEDURE — 76536 US EXAM OF HEAD AND NECK: CPT | Performed by: RADIOLOGY

## 2025-08-04 PROCEDURE — 76536 US EXAM OF HEAD AND NECK: CPT

## 2025-08-05 RX ORDER — PANTOPRAZOLE SODIUM 40 MG/1
40 TABLET, DELAYED RELEASE ORAL
Qty: 90 TABLET | Refills: 0 | OUTPATIENT
Start: 2025-08-05

## 2025-08-05 NOTE — TELEPHONE ENCOUNTER
Pt of Dr. Carnes's office.     I was asked to cover refills for Dr. Carnes's office for a month, starting 1/3/25 (ending 2/3/25).     Patient will need to get all further refills from Dr. Carnes's office or from their new PCP.

## 2025-08-06 ENCOUNTER — HOSPITAL ENCOUNTER (OUTPATIENT)
Dept: RADIOLOGY | Facility: HOSPITAL | Age: 75
Discharge: HOME | End: 2025-08-06
Payer: COMMERCIAL

## 2025-08-06 DIAGNOSIS — M54.9 DORSALGIA, UNSPECIFIED: ICD-10-CM

## 2025-08-06 DIAGNOSIS — R10.9 UNSPECIFIED ABDOMINAL PAIN: ICD-10-CM

## 2025-08-06 PROCEDURE — 72072 X-RAY EXAM THORAC SPINE 3VWS: CPT

## 2025-08-06 PROCEDURE — 72050 X-RAY EXAM NECK SPINE 4/5VWS: CPT | Performed by: RADIOLOGY

## 2025-08-06 PROCEDURE — 74018 RADEX ABDOMEN 1 VIEW: CPT | Performed by: RADIOLOGY

## 2025-08-06 PROCEDURE — 72110 X-RAY EXAM L-2 SPINE 4/>VWS: CPT

## 2025-08-06 PROCEDURE — 74018 RADEX ABDOMEN 1 VIEW: CPT

## 2025-08-06 PROCEDURE — 72072 X-RAY EXAM THORAC SPINE 3VWS: CPT | Performed by: RADIOLOGY

## 2025-08-06 PROCEDURE — 72050 X-RAY EXAM NECK SPINE 4/5VWS: CPT

## 2025-08-06 PROCEDURE — 72110 X-RAY EXAM L-2 SPINE 4/>VWS: CPT | Performed by: RADIOLOGY

## 2025-08-11 DIAGNOSIS — K21.9 ESOPHAGEAL REFLUX: ICD-10-CM

## 2025-08-12 RX ORDER — PANTOPRAZOLE SODIUM 40 MG/1
40 TABLET, DELAYED RELEASE ORAL
Qty: 90 TABLET | Refills: 0 | OUTPATIENT
Start: 2025-08-12

## (undated) DEVICE — GUIDEWIRE, GO2WIRE, STEERABLE, 0.035 X 260CM, FLOPPY STRAIGHT

## (undated) DEVICE — Device

## (undated) DEVICE — DRESSING, ISLAND, TELFA, 4 X 5 IN

## (undated) DEVICE — SUTURE, VICRYL, 0, 27 IN, CT-1, UNDYED

## (undated) DEVICE — GUIDEWIRE, STIFF SHAFT, ANGLE TIP, .035 DIA, 260 CM,  3 CM TIP"

## (undated) DEVICE — SUTURE, ETHIBOND EXCEL 1, TAPER POINT CT-1 GREEN 30 INCH

## (undated) DEVICE — STAPLER, ENDO ECHELON 45MM RELOAD, BLACK, REUSABLE

## (undated) DEVICE — CATHETER, CXI SUPPORT, .018 2.6F X 65CM, STR TIP

## (undated) DEVICE — INTRODUCER, MICROPUNCTURE, 2.9/4.0 FR, W/ GUIDEWIRE, ECHO

## (undated) DEVICE — DRESSING, ADHESIVE, ISLAND, TELFA, 2 X 3.75 IN, LF

## (undated) DEVICE — GUIDEWIRE, HI-TORQUE, COMMAND ES, 0.014 X 300CM

## (undated) DEVICE — CATHETER, OPTICROSS 6HD, 3.6F X 135CM

## (undated) DEVICE — SHEATH, PINNACLE, 10 CM,  6FR INTRODUCER, 6FR DIA, 2.5 CM DIALATOR

## (undated) DEVICE — ADHESIVE, SKIN, MASTISOL, 2/3 CC VIAL

## (undated) DEVICE — CATHETER, THORACIC, STRAIGHT, ADULT, 28 FR, PVC

## (undated) DEVICE — DRAPE, TIBURON, SPLIT SHEET, REINF ADH STRIP, 77X122

## (undated) DEVICE — CATHETER, NAVICROSS, 0.035 X 150CM, ANGLED TIP

## (undated) DEVICE — COLLECTION UNIT, DRAINAGE, THORACIC, SINGLE TUBE, DRY SUCTION, ATS COMPATIBLE, OASIS 3600, LF

## (undated) DEVICE — CLOSURE SYSTEM, VASCULAR, VASCADE 6/7F VCS

## (undated) DEVICE — TOWEL PACK, STERILE, 4/PACK, BLUE

## (undated) DEVICE — STRIP, SKIN CLOSURE, STERI STRIP, REINFORCED, 0.5 X 4 IN

## (undated) DEVICE — TIP,  ELECTRODE COATED INSULATED, EXTENDED, LF

## (undated) DEVICE — INFLATION KIT, ADVANTAGE, ENCORE 26 (1/BOX)

## (undated) DEVICE — ACCESS KIT, S-MAK MINI, 4FR 10CM 0.018IN 40CM, NT/PT, ECHO ENHANCE NEEDLE

## (undated) DEVICE — SHEATH, PINNACLE, 10 CM,  5FR INTRODUCER, 5FR DIA, 2.5 CM DIALATOR

## (undated) DEVICE — DRESSING, GAUZE, DRAIN SPONGE, 6 PLY, EXCILON, 4 X 4 IN, STERILE

## (undated) DEVICE — DRAPE, INCISE, ANTIMICROBIAL, IOBAN 2, LARGE, 17 X 23 IN, DISPOSABLE, STERILE

## (undated) DEVICE — GLOVE, SURGICAL, PROTEXIS PI BLUE W/NEUTHERA, 6.5, PF, LF

## (undated) DEVICE — DRESSING, NON-ADHERENT, TELFA, 3 X 8 IN, NS

## (undated) DEVICE — STAPLER, EF POWERED PLUS, CUTTER 340MM, 45MM EFFECTOR, DISP

## (undated) DEVICE — COVER, LIGHT HANDLE, SURGICAL, FLEXIBLE, DISPOSABLE, STERILE

## (undated) DEVICE — CATHETER, PRELUDE ROADSTER 6FR, 45CM, STRAIGHT

## (undated) DEVICE — SUTURE, VICRYL, 3-0, 18 IN, PS-1, UNDYED

## (undated) DEVICE — SUTURE, VICRYL 2-0, TAPER POINT, CT-1 UNDYED 27 INCH

## (undated) DEVICE — CARE KIT, LAPAROSCOPIC, ADVANCED

## (undated) DEVICE — SHEET, SPLIT, CARDIOVASCULAR TIBURON

## (undated) DEVICE — INSTRUMENT, DISSECTING, ENDOSCOPIC, PEANUT, 5 MM, STERILE

## (undated) DEVICE — CATHETER, ANGIO, IMPULSE, IM, 5 FR X 100 CM

## (undated) DEVICE — GUIDEWIRE, COMMAND ST, HI-TORQUE, 0.18 X 300CM